# Patient Record
Sex: FEMALE | Race: WHITE | NOT HISPANIC OR LATINO | Employment: OTHER | URBAN - METROPOLITAN AREA
[De-identification: names, ages, dates, MRNs, and addresses within clinical notes are randomized per-mention and may not be internally consistent; named-entity substitution may affect disease eponyms.]

---

## 2017-01-30 ENCOUNTER — HISTORICAL (OUTPATIENT)
Dept: LAB | Facility: HOSPITAL | Age: 62
End: 2017-01-30

## 2017-04-04 ENCOUNTER — HISTORICAL (OUTPATIENT)
Dept: LAB | Facility: HOSPITAL | Age: 62
End: 2017-04-04

## 2017-05-02 ENCOUNTER — HISTORICAL (OUTPATIENT)
Dept: LAB | Facility: HOSPITAL | Age: 62
End: 2017-05-02

## 2017-05-02 LAB
BILIRUB SERPL-MCNC: NEGATIVE MG/DL
BLOOD URINE, POC: NORMAL
CLARITY, POC UA: CLEAR
COLOR, POC UA: YELLOW
GLUCOSE UR QL STRIP: NEGATIVE
KETONES UR QL STRIP: NEGATIVE
LEUKOCYTE EST, POC UA: NORMAL
NITRITE, POC UA: NEGATIVE
PH, POC UA: 6
PROTEIN, POC: NEGATIVE
SPECIFIC GRAVITY, POC UA: 1.01
UROBILINOGEN, POC UA: NORMAL

## 2017-07-19 ENCOUNTER — HISTORICAL (OUTPATIENT)
Dept: LAB | Facility: HOSPITAL | Age: 62
End: 2017-07-19

## 2017-07-19 LAB
BUN SERPL-MCNC: 16 MG/DL (ref 7–18)
CALCIUM SERPL-MCNC: 9.6 MG/DL (ref 8.5–10.1)
CHLORIDE SERPL-SCNC: 105 MMOL/L (ref 98–107)
CO2 SERPL-SCNC: 31.3 MMOL/L (ref 21–32)
CREAT SERPL-MCNC: 0.9 MG/DL (ref 0.55–1.02)
GLUCOSE SERPL-MCNC: 120 MG/DL (ref 74–106)
POTASSIUM SERPL-SCNC: 4.6 MMOL/L (ref 3.5–5.1)
SODIUM SERPL-SCNC: 144 MMOL/L (ref 136–145)

## 2017-07-28 ENCOUNTER — HISTORICAL (OUTPATIENT)
Dept: LAB | Facility: HOSPITAL | Age: 62
End: 2017-07-28

## 2017-07-28 LAB
BILIRUB SERPL-MCNC: NEGATIVE MG/DL
BLOOD URINE, POC: NORMAL
CLARITY, POC UA: NORMAL
COLOR, POC UA: NORMAL
GLUCOSE UR QL STRIP: NEGATIVE
KETONES UR QL STRIP: NEGATIVE
LEUKOCYTE EST, POC UA: NORMAL
NITRITE, POC UA: NEGATIVE
PH, POC UA: 5
PROTEIN, POC: NEGATIVE
SPECIFIC GRAVITY, POC UA: 1.02
UROBILINOGEN, POC UA: NORMAL

## 2017-08-14 ENCOUNTER — HISTORICAL (OUTPATIENT)
Dept: OCCUPATIONAL THERAPY | Facility: HOSPITAL | Age: 62
End: 2017-08-14

## 2017-08-15 ENCOUNTER — HOSPITAL ENCOUNTER (OUTPATIENT)
Dept: MEDSURG UNIT | Facility: HOSPITAL | Age: 62
End: 2017-08-17
Attending: FAMILY MEDICINE | Admitting: FAMILY MEDICINE

## 2017-08-15 LAB
APPEARANCE, UA: CLEAR
BACTERIA SPEC CULT: NORMAL
BILIRUB UR QL STRIP: NEGATIVE
COLOR UR: YELLOW
GLUCOSE (UA): NEGATIVE
HGB UR QL STRIP: NEGATIVE
KETONES UR QL STRIP: NEGATIVE
LEUKOCYTE ESTERASE UR QL STRIP: NEGATIVE
NITRITE UR QL STRIP: NEGATIVE
PH UR STRIP: 7 [PH] (ref 4.6–8)
PROT UR QL STRIP: NEGATIVE
RBC #/AREA URNS HPF: NORMAL /[HPF]
SP GR UR STRIP: 1.01 (ref 1–1.03)
SQUAMOUS EPITHELIAL, UA: NORMAL
UROBILINOGEN UR STRIP-ACNC: 0.2
WBC #/AREA URNS HPF: NORMAL /HPF

## 2017-08-16 LAB
INR PPP: 1.89
PROTHROMBIN TIME: 19.2 SECOND(S) (ref 9–11.5)
TSH SERPL-ACNC: 1.36 MIU/ML (ref 0.35–3.75)

## 2017-08-17 LAB
ERYTHROCYTE [SEDIMENTATION RATE] IN BLOOD: 33 MM/HR (ref 0–20)
INR PPP: 1.84
PROTHROMBIN TIME: 18.7 SECOND(S) (ref 9–11.5)
RHEUMATOID FACT SERPL-ACNC: 26.2 IU/ML (ref 0–15)
URATE SERPL-MCNC: 5.4 MG/DL (ref 2.6–7.2)

## 2017-09-08 ENCOUNTER — HISTORICAL (OUTPATIENT)
Dept: LAB | Facility: HOSPITAL | Age: 62
End: 2017-09-08

## 2017-09-08 LAB
ABS NEUT (OLG): 2.8
ALBUMIN SERPL-MCNC: 3.7 GM/DL (ref 3.4–5)
ALBUMIN/GLOB SERPL: 1 RATIO (ref 1.1–2)
ALP SERPL-CCNC: 88 UNIT/L (ref 50–136)
ALT SERPL-CCNC: 34 UNIT/L (ref 12–78)
AST SERPL-CCNC: 23 UNIT/L (ref 10–37)
BASOPHILS # BLD AUTO: 0.04 X10(3)/MCL
BASOPHILS NFR BLD AUTO: 0.6 %
BILIRUB SERPL-MCNC: 0.5 MG/DL (ref 0.2–1)
BILIRUBIN DIRECT+TOT PNL SERPL-MCNC: 0.1 MG/DL (ref 0.05–0.2)
BILIRUBIN DIRECT+TOT PNL SERPL-MCNC: 0.4 MG/DL
BUN SERPL-MCNC: 21 MG/DL (ref 7–18)
CALCIUM SERPL-MCNC: 9.5 MG/DL (ref 8.5–10.1)
CHLORIDE SERPL-SCNC: 105 MMOL/L (ref 98–107)
CHOLEST SERPL-MCNC: 210 MG/DL (ref 50–200)
CHOLEST/HDLC SERPL: 2 {RATIO} (ref 0–5)
CO2 SERPL-SCNC: 28.7 MMOL/L (ref 21–32)
CREAT SERPL-MCNC: 0.85 MG/DL (ref 0.55–1.02)
EOSINOPHIL # BLD AUTO: 0.25 X10(3)/MCL
EOSINOPHIL NFR BLD AUTO: 4 %
ERYTHROCYTE [DISTWIDTH] IN BLOOD BY AUTOMATED COUNT: 12 %
EST. AVERAGE GLUCOSE BLD GHB EST-MCNC: 157 MG/DL
FOLATE SERPL-MCNC: 41.9 NG/ML (ref 3.1–17.5)
GLOBULIN SER-MCNC: 3.8 GM/DL (ref 2.4–3.5)
GLUCOSE SERPL-MCNC: 177 MG/DL (ref 74–106)
HBA1C MFR BLD: 7.1 % (ref 4.5–6.2)
HCT VFR BLD AUTO: 40 % (ref 34–46)
HDLC SERPL-MCNC: 104 MG/DL (ref 35–60)
HGB BLD-MCNC: 12.6 GM/DL (ref 11.3–15.4)
IMM GRANULOCYTES # BLD AUTO: 0.01 10*3/UL (ref 0–0.1)
IMM GRANULOCYTES NFR BLD AUTO: 0.2 % (ref 0–1)
LDLC SERPL CALC-MCNC: 83 MG/DL (ref 50–140)
LYMPHOCYTES # BLD AUTO: 2.61 X10(3)/MCL
LYMPHOCYTES NFR BLD AUTO: 41.4 %
MCH RBC QN AUTO: 31.4 PG (ref 27–33)
MCHC RBC AUTO-ENTMCNC: 31.5 GM/DL (ref 32–35)
MCV RBC AUTO: 99.8 FL (ref 81–97)
MONOCYTES # BLD AUTO: 0.59 X10(3)/MCL
MONOCYTES NFR BLD AUTO: 9.4 %
NEUTROPHILS # BLD AUTO: 2.8 X10(3)/MCL
NEUTROPHILS NFR BLD AUTO: 44.4 %
PLATELET # BLD AUTO: 312 X10(3)/MCL (ref 151–368)
PMV BLD AUTO: 9 FL
POTASSIUM SERPL-SCNC: 4.2 MMOL/L (ref 3.5–5.1)
PROT SERPL-MCNC: 7.5 GM/DL (ref 6.4–8.2)
RBC # BLD AUTO: 4.01 X10(6)/MCL (ref 3.9–5)
SODIUM SERPL-SCNC: 143 MMOL/L (ref 136–145)
TRIGL SERPL-MCNC: 114 MG/DL (ref 30–150)
VIT B12 SERPL-MCNC: 1001 PG/ML (ref 193–986)
VLDLC SERPL CALC-MCNC: 23 MG/DL
WBC # SPEC AUTO: 6.3 X10(3)/MCL (ref 3.4–9.2)

## 2017-09-11 ENCOUNTER — HISTORICAL (OUTPATIENT)
Dept: LAB | Facility: HOSPITAL | Age: 62
End: 2017-09-11

## 2017-09-11 LAB
APPEARANCE, UA: CLEAR
BACTERIA SPEC CULT: ABNORMAL
BILIRUB UR QL STRIP: NEGATIVE
COLOR UR: ABNORMAL
GLUCOSE (UA): ABNORMAL
HGB UR QL STRIP: ABNORMAL
KETONES UR QL STRIP: NEGATIVE
LEUKOCYTE ESTERASE UR QL STRIP: NEGATIVE
NITRITE UR QL STRIP: POSITIVE
PH UR STRIP: 5 [PH] (ref 4.6–8)
PROT UR QL STRIP: ABNORMAL
RBC #/AREA URNS HPF: ABNORMAL /[HPF]
SP GR UR STRIP: 1.01 (ref 1–1.03)
SQUAMOUS EPITHELIAL, UA: ABNORMAL
UROBILINOGEN UR STRIP-ACNC: 2
WBC #/AREA URNS HPF: ABNORMAL /HPF

## 2017-09-14 ENCOUNTER — HISTORICAL (OUTPATIENT)
Dept: LAB | Facility: HOSPITAL | Age: 62
End: 2017-09-14

## 2017-10-02 ENCOUNTER — HISTORICAL (OUTPATIENT)
Dept: LAB | Facility: HOSPITAL | Age: 62
End: 2017-10-02

## 2017-10-02 LAB
APPEARANCE, UA: CLEAR
APTT PPP: 37.7 SECOND(S) (ref 23–32)
BACTERIA SPEC CULT: ABNORMAL
BILIRUB UR QL STRIP: NEGATIVE
COLOR UR: YELLOW
GLUCOSE (UA): ABNORMAL
HGB UR QL STRIP: ABNORMAL
INR PPP: 2.79
KETONES UR QL STRIP: NEGATIVE
LEUKOCYTE ESTERASE UR QL STRIP: NEGATIVE
NITRITE UR QL STRIP: NEGATIVE
PH UR STRIP: 6 [PH] (ref 4.6–8)
PROT SERPL-MCNC: 6.8 GM/DL
PROT UR QL STRIP: NEGATIVE
PROTHROMBIN TIME: 28.8 SECOND(S) (ref 9–11.5)
RBC #/AREA URNS HPF: ABNORMAL /HPF
SP GR UR STRIP: 1.01 (ref 1–1.03)
SQUAMOUS EPITHELIAL, UA: ABNORMAL
TSH SERPL-ACNC: 1.22 MIU/ML (ref 0.35–3.75)
UROBILINOGEN UR STRIP-ACNC: 0.2
WBC #/AREA URNS HPF: ABNORMAL /HPF

## 2017-10-04 LAB — FINAL CULTURE: NO GROWTH

## 2017-10-23 ENCOUNTER — HISTORICAL (OUTPATIENT)
Dept: RADIOLOGY | Facility: HOSPITAL | Age: 62
End: 2017-10-23

## 2018-03-21 ENCOUNTER — HISTORICAL (OUTPATIENT)
Dept: RADIOLOGY | Facility: HOSPITAL | Age: 63
End: 2018-03-21

## 2018-03-28 ENCOUNTER — HISTORICAL (OUTPATIENT)
Dept: LAB | Facility: HOSPITAL | Age: 63
End: 2018-03-28

## 2018-03-28 LAB
ALBUMIN SERPL-MCNC: 3.7 GM/DL (ref 3.4–5)
ALP SERPL-CCNC: 99 UNIT/L (ref 46–116)
ALT SERPL-CCNC: 37 UNIT/L (ref 12–78)
AST SERPL-CCNC: 28 UNIT/L (ref 10–37)
BILIRUB SERPL-MCNC: 0.4 MG/DL (ref 0.2–1)
BILIRUBIN DIRECT+TOT PNL SERPL-MCNC: 0.13 MG/DL (ref 0–0.2)
BILIRUBIN DIRECT+TOT PNL SERPL-MCNC: 0.27 MG/DL
CHOLEST SERPL-MCNC: 246 MG/DL (ref 50–200)
CHOLEST/HDLC SERPL: 3 {RATIO} (ref 0–5)
HDLC SERPL-MCNC: 81 MG/DL (ref 35–60)
LDLC SERPL CALC-MCNC: 144 MG/DL (ref 50–140)
PROT SERPL-MCNC: 7.7 GM/DL (ref 6.4–8.2)
TRIGL SERPL-MCNC: 105 MG/DL (ref 30–150)
VLDLC SERPL CALC-MCNC: 21 MG/DL

## 2018-08-20 ENCOUNTER — HISTORICAL (OUTPATIENT)
Dept: RADIOLOGY | Facility: HOSPITAL | Age: 63
End: 2018-08-20

## 2018-08-20 LAB
ABS NEUT (OLG): 4.1
ALBUMIN SERPL-MCNC: 3.4 GM/DL (ref 3.4–5)
ALBUMIN/GLOB SERPL: 0.9 RATIO (ref 1.1–2)
ALP SERPL-CCNC: 118 UNIT/L (ref 46–116)
ALT SERPL-CCNC: 55 UNIT/L (ref 12–78)
APPEARANCE, UA: CLEAR
APTT PPP: 29.8 SECOND(S) (ref 24.5–32.8)
AST SERPL-CCNC: 36 UNIT/L (ref 10–37)
BACTERIA SPEC CULT: ABNORMAL
BASOPHILS # BLD AUTO: 0.03 X10(3)/MCL
BASOPHILS NFR BLD AUTO: 0.4 %
BILIRUB SERPL-MCNC: 0.5 MG/DL (ref 0.2–1)
BILIRUB UR QL STRIP: NEGATIVE
BILIRUBIN DIRECT+TOT PNL SERPL-MCNC: 0.13 MG/DL (ref 0–0.2)
BILIRUBIN DIRECT+TOT PNL SERPL-MCNC: 0.37 MG/DL
BUN SERPL-MCNC: 18 MG/DL (ref 7–18)
CALCIUM SERPL-MCNC: 9.1 MG/DL (ref 8.5–10.1)
CHLORIDE SERPL-SCNC: 102 MMOL/L (ref 98–107)
CO2 SERPL-SCNC: 27.7 MMOL/L (ref 21–32)
COLOR UR: YELLOW
CREAT SERPL-MCNC: 0.78 MG/DL (ref 0.55–1.02)
EOSINOPHIL # BLD AUTO: 0.25 X10(3)/MCL
EOSINOPHIL NFR BLD AUTO: 3.5 %
ERYTHROCYTE [DISTWIDTH] IN BLOOD BY AUTOMATED COUNT: 13 %
GLOBULIN SER-MCNC: 3.8 GM/DL (ref 2.4–3.5)
GLUCOSE (UA): ABNORMAL
GLUCOSE SERPL-MCNC: 165 MG/DL (ref 74–106)
HCT VFR BLD AUTO: 38.8 % (ref 34–46)
HGB BLD-MCNC: 12.7 GM/DL (ref 11.3–15.4)
HGB UR QL STRIP: ABNORMAL
IMM GRANULOCYTES # BLD AUTO: 0.01 10*3/UL (ref 0–0.1)
IMM GRANULOCYTES NFR BLD AUTO: 0.1 % (ref 0–1)
INR PPP: 2.2
KETONES UR QL STRIP: ABNORMAL
LEUKOCYTE ESTERASE UR QL STRIP: NEGATIVE
LYMPHOCYTES # BLD AUTO: 2.11 X10(3)/MCL
LYMPHOCYTES NFR BLD AUTO: 29.9 %
MCH RBC QN AUTO: 32.7 PG (ref 27–33)
MCHC RBC AUTO-ENTMCNC: 32.7 GM/DL (ref 32–35)
MCV RBC AUTO: 100 FL (ref 81–97)
MONOCYTES # BLD AUTO: 0.56 X10(3)/MCL
MONOCYTES NFR BLD AUTO: 7.9 %
NEUTROPHILS # BLD AUTO: 4.1 X10(3)/MCL
NEUTROPHILS NFR BLD AUTO: 58.2 %
NITRITE UR QL STRIP: POSITIVE
PH UR STRIP: 5.5 [PH] (ref 4.6–8)
PLATELET # BLD AUTO: 281 X10(3)/MCL (ref 151–368)
PMV BLD AUTO: 9 FL
POTASSIUM SERPL-SCNC: 4.7 MMOL/L (ref 3.5–5.1)
PROT SERPL-MCNC: 7.2 GM/DL (ref 6.4–8.2)
PROT UR QL STRIP: NEGATIVE
PROTHROMBIN TIME: 22.2 SECOND(S) (ref 9.3–11.4)
RBC # BLD AUTO: 3.88 X10(6)/MCL (ref 3.9–5)
RBC #/AREA URNS HPF: ABNORMAL /HPF
SODIUM SERPL-SCNC: 138 MMOL/L (ref 136–145)
SP GR UR STRIP: 1.01 (ref 1–1.03)
SQUAMOUS EPITHELIAL, UA: ABNORMAL
UROBILINOGEN UR STRIP-ACNC: 0.2
WBC # SPEC AUTO: 7.06 X10(3)/MCL (ref 3.4–9.2)
WBC #/AREA URNS HPF: ABNORMAL /HPF

## 2018-08-28 ENCOUNTER — HISTORICAL (OUTPATIENT)
Dept: RADIOLOGY | Facility: HOSPITAL | Age: 63
End: 2018-08-28

## 2018-08-28 LAB
EST. AVERAGE GLUCOSE BLD GHB EST-MCNC: 171 MG/DL
HBA1C MFR BLD: 7.6 % (ref 4.5–6.2)

## 2018-09-26 ENCOUNTER — HISTORICAL (OUTPATIENT)
Dept: LAB | Facility: HOSPITAL | Age: 63
End: 2018-09-26

## 2018-09-26 LAB
APPEARANCE, UA: CLEAR
BACTERIA SPEC CULT: ABNORMAL
BILIRUB UR QL STRIP: NEGATIVE
COLOR UR: YELLOW
GLUCOSE (UA): ABNORMAL
HGB UR QL STRIP: ABNORMAL
KETONES UR QL STRIP: NEGATIVE
LEUKOCYTE ESTERASE UR QL STRIP: NEGATIVE
NITRITE UR QL STRIP: NEGATIVE
PH UR STRIP: 6.5 [PH] (ref 4.6–8)
PROT UR QL STRIP: NEGATIVE
RBC #/AREA URNS HPF: ABNORMAL /[HPF]
SP GR UR STRIP: 1.01 (ref 1–1.03)
SQUAMOUS EPITHELIAL, UA: ABNORMAL
UROBILINOGEN UR STRIP-ACNC: 0.2
WBC #/AREA URNS HPF: ABNORMAL /[HPF]

## 2018-10-22 ENCOUNTER — HISTORICAL (OUTPATIENT)
Dept: RADIOLOGY | Facility: HOSPITAL | Age: 63
End: 2018-10-22

## 2019-02-27 ENCOUNTER — HISTORICAL (OUTPATIENT)
Dept: LAB | Facility: HOSPITAL | Age: 64
End: 2019-02-27

## 2019-02-27 LAB
ALBUMIN SERPL-MCNC: 3.4 GM/DL (ref 3.4–5)
ALP SERPL-CCNC: 107 UNIT/L (ref 46–116)
ALT SERPL-CCNC: 32 UNIT/L (ref 12–78)
AST SERPL-CCNC: 26 UNIT/L (ref 10–37)
BILIRUB SERPL-MCNC: 0.4 MG/DL (ref 0.2–1)
BILIRUBIN DIRECT+TOT PNL SERPL-MCNC: 0.1 MG/DL (ref 0–0.2)
BILIRUBIN DIRECT+TOT PNL SERPL-MCNC: 0.3 MG/DL
CHOLEST SERPL-MCNC: 257 MG/DL (ref 50–200)
CHOLEST/HDLC SERPL: 3 {RATIO} (ref 0–5)
HDLC SERPL-MCNC: 75 MG/DL (ref 35–60)
LDLC SERPL CALC-MCNC: 159 MG/DL (ref 50–140)
PROT SERPL-MCNC: 7.7 GM/DL (ref 6.4–8.2)
TRIGL SERPL-MCNC: 113 MG/DL (ref 30–150)
VLDLC SERPL CALC-MCNC: 23 MG/DL

## 2019-09-06 ENCOUNTER — HISTORICAL (OUTPATIENT)
Dept: LAB | Facility: HOSPITAL | Age: 64
End: 2019-09-06

## 2019-09-06 LAB
ALBUMIN SERPL-MCNC: 3.4 GM/DL (ref 3.4–5)
ALP SERPL-CCNC: 92 UNIT/L (ref 46–116)
ALT SERPL-CCNC: 36 UNIT/L (ref 12–78)
AST SERPL-CCNC: 27 UNIT/L (ref 10–37)
BILIRUB SERPL-MCNC: 0.2 MG/DL (ref 0.2–1)
BILIRUBIN DIRECT+TOT PNL SERPL-MCNC: 0.1 MG/DL
BILIRUBIN DIRECT+TOT PNL SERPL-MCNC: 0.1 MG/DL (ref 0–0.2)
CHOLEST SERPL-MCNC: 272 MG/DL (ref 50–200)
CHOLEST/HDLC SERPL: 3 {RATIO} (ref 0–5)
HDLC SERPL-MCNC: 87 MG/DL (ref 35–60)
LDLC SERPL CALC-MCNC: 169 MG/DL (ref 50–140)
PROT SERPL-MCNC: 7.4 GM/DL (ref 6.4–8.2)
TRIGL SERPL-MCNC: 81 MG/DL (ref 30–150)
VLDLC SERPL CALC-MCNC: 16 MG/DL

## 2019-10-04 ENCOUNTER — HISTORICAL (OUTPATIENT)
Dept: LAB | Facility: HOSPITAL | Age: 64
End: 2019-10-04

## 2019-10-04 ENCOUNTER — HISTORICAL (OUTPATIENT)
Dept: WOUND CARE | Facility: HOSPITAL | Age: 64
End: 2019-10-04

## 2019-10-04 LAB
ABS NEUT (OLG): 3.69
BASOPHILS # BLD AUTO: 0.03 X10(3)/MCL
BASOPHILS NFR BLD AUTO: 0.5 %
EOSINOPHIL # BLD AUTO: 0.17 X10(3)/MCL
EOSINOPHIL NFR BLD AUTO: 2.7 %
ERYTHROCYTE [DISTWIDTH] IN BLOOD BY AUTOMATED COUNT: 13 %
EST. AVERAGE GLUCOSE BLD GHB EST-MCNC: 212 MG/DL
HBA1C MFR BLD: 9 % (ref 4.5–6.2)
HCT VFR BLD AUTO: 40.4 % (ref 34–46)
HGB BLD-MCNC: 13.1 GM/DL (ref 11.3–15.4)
IMM GRANULOCYTES # BLD AUTO: 0.01 10*3/UL (ref 0–0.1)
IMM GRANULOCYTES NFR BLD AUTO: 0.2 % (ref 0–1)
LYMPHOCYTES # BLD AUTO: 2.04 X10(3)/MCL
LYMPHOCYTES NFR BLD AUTO: 31.9 %
MCH RBC QN AUTO: 32.1 PG (ref 27–33)
MCHC RBC AUTO-ENTMCNC: 32.4 GM/DL (ref 32–35)
MCV RBC AUTO: 99 FL (ref 81–97)
MONOCYTES # BLD AUTO: 0.46 X10(3)/MCL
MONOCYTES NFR BLD AUTO: 7.2 %
NEUTROPHILS # BLD AUTO: 3.69 X10(3)/MCL
NEUTROPHILS NFR BLD AUTO: 57.5 %
PLATELET # BLD AUTO: 316 X10(3)/MCL (ref 140–450)
PMV BLD AUTO: 8 FL
PREALB SERPL-MCNC: 27.1 MG/DL (ref 18–38)
RBC # BLD AUTO: 4.08 X10(6)/MCL (ref 3.9–5)
WBC # SPEC AUTO: 6.4 X10(3)/MCL (ref 3.4–9.2)

## 2019-11-12 ENCOUNTER — HISTORICAL (OUTPATIENT)
Dept: RADIOLOGY | Facility: HOSPITAL | Age: 64
End: 2019-11-12

## 2019-11-12 LAB
ABS NEUT (OLG): 2.72
ALBUMIN SERPL-MCNC: 3.6 GM/DL (ref 3.4–5)
ALBUMIN/GLOB SERPL: 0.9 RATIO (ref 1.1–2)
ALP SERPL-CCNC: 126 UNIT/L (ref 46–116)
ALT SERPL-CCNC: 28 UNIT/L (ref 12–78)
APPEARANCE, UA: ABNORMAL
APTT PPP: 44.6 SECOND(S) (ref 24.5–32.8)
AST SERPL-CCNC: 18 UNIT/L (ref 10–37)
BACTERIA SPEC CULT: ABNORMAL
BASOPHILS # BLD AUTO: 0.03 X10(3)/MCL
BASOPHILS NFR BLD AUTO: 0.5 %
BILIRUB SERPL-MCNC: 0.3 MG/DL (ref 0.2–1)
BILIRUB UR QL STRIP: NEGATIVE
BILIRUBIN DIRECT+TOT PNL SERPL-MCNC: 0.09 MG/DL (ref 0–0.2)
BILIRUBIN DIRECT+TOT PNL SERPL-MCNC: 0.21 MG/DL
BUN SERPL-MCNC: 18 MG/DL (ref 7–18)
CALCIUM SERPL-MCNC: 9.7 MG/DL (ref 8.5–10.1)
CHLORIDE SERPL-SCNC: 105 MMOL/L (ref 98–107)
CO2 SERPL-SCNC: 25.7 MMOL/L (ref 21–32)
COLOR UR: YELLOW
CREAT SERPL-MCNC: 0.91 MG/DL (ref 0.55–1.02)
EOSINOPHIL # BLD AUTO: 0.29 X10(3)/MCL
EOSINOPHIL NFR BLD AUTO: 4.8 %
ERYTHROCYTE [DISTWIDTH] IN BLOOD BY AUTOMATED COUNT: 15 %
GLOBULIN SER-MCNC: 4.2 GM/DL (ref 2.4–3.5)
GLUCOSE (UA): ABNORMAL
GLUCOSE SERPL-MCNC: 205 MG/DL (ref 74–106)
HCT VFR BLD AUTO: 36.1 % (ref 34–46)
HGB BLD-MCNC: 11.4 GM/DL (ref 11.3–15.4)
HGB UR QL STRIP: ABNORMAL
IMM GRANULOCYTES # BLD AUTO: 0.01 10*3/UL (ref 0–0.1)
IMM GRANULOCYTES NFR BLD AUTO: 0.2 % (ref 0–1)
INR PPP: 4.8
KETONES UR QL STRIP: NEGATIVE
LEUKOCYTE ESTERASE UR QL STRIP: NEGATIVE
LYMPHOCYTES # BLD AUTO: 2.49 X10(3)/MCL
LYMPHOCYTES NFR BLD AUTO: 40.8 %
MCH RBC QN AUTO: 31.1 PG (ref 27–33)
MCHC RBC AUTO-ENTMCNC: 31.6 GM/DL (ref 32–35)
MCV RBC AUTO: 98.4 FL (ref 81–97)
MONOCYTES # BLD AUTO: 0.56 X10(3)/MCL
MONOCYTES NFR BLD AUTO: 9.2 %
NEUTROPHILS # BLD AUTO: 2.72 X10(3)/MCL
NEUTROPHILS NFR BLD AUTO: 44.5 %
NITRITE UR QL STRIP: POSITIVE
PH UR STRIP: 5.5 [PH] (ref 4.6–8)
PLATELET # BLD AUTO: 385 X10(3)/MCL (ref 140–450)
PMV BLD AUTO: 9 FL
POTASSIUM SERPL-SCNC: 4.5 MMOL/L (ref 3.5–5.1)
PROT SERPL-MCNC: 7.8 GM/DL (ref 6.4–8.2)
PROT UR QL STRIP: NEGATIVE
PROTHROMBIN TIME: 48.6 SECOND(S) (ref 8.6–10.1)
RBC # BLD AUTO: 3.67 X10(6)/MCL (ref 3.9–5)
RBC #/AREA URNS HPF: ABNORMAL /[HPF]
SODIUM SERPL-SCNC: 144 MMOL/L (ref 136–145)
SP GR UR STRIP: 1.02 (ref 1–1.03)
SQUAMOUS EPITHELIAL, UA: ABNORMAL
UROBILINOGEN UR STRIP-ACNC: 0.2
WBC # SPEC AUTO: 6.1 X10(3)/MCL (ref 3.4–9.2)
WBC #/AREA URNS HPF: ABNORMAL /HPF

## 2019-11-15 ENCOUNTER — HISTORICAL (OUTPATIENT)
Dept: LAB | Facility: HOSPITAL | Age: 64
End: 2019-11-15

## 2019-11-15 LAB
FINAL CULTURE: NORMAL
INR PPP: 4.6
PROTHROMBIN TIME: 46.6 SECOND(S) (ref 8.6–10.1)

## 2019-11-18 ENCOUNTER — HISTORICAL (OUTPATIENT)
Dept: LAB | Facility: HOSPITAL | Age: 64
End: 2019-11-18

## 2019-11-18 ENCOUNTER — NURSE TRIAGE (OUTPATIENT)
Dept: ADMINISTRATIVE | Facility: CLINIC | Age: 64
End: 2019-11-18

## 2019-11-18 LAB
INR PPP: 2.2
PROTHROMBIN TIME: 22 SECOND(S) (ref 8.6–10.1)

## 2019-11-18 NOTE — TELEPHONE ENCOUNTER
Reason for Disposition   Caller has urgent medication question about med that PCP prescribed and triager unable to answer question    Protocols used: MEDICATION QUESTION CALL-A-    Patient called and states her inr is 3.4. She has not been on her warfarin since 11/15. She wanted to know today's lab value to know how much warfarin to take. Explained to her that she would have to contact her local cardiology group at [facility] to see who is on call. If no one on call, she would have to go to the ED. Patient refused the ED disposition. Unsure what she will do.

## 2019-11-19 ENCOUNTER — HISTORICAL (OUTPATIENT)
Dept: LAB | Facility: HOSPITAL | Age: 64
End: 2019-11-19

## 2019-11-19 LAB
APPEARANCE, UA: CLEAR
BACTERIA SPEC CULT: ABNORMAL
BILIRUB UR QL STRIP: NEGATIVE
COLOR UR: YELLOW
GLUCOSE (UA): ABNORMAL
HGB UR QL STRIP: ABNORMAL
KETONES UR QL STRIP: NEGATIVE
LEUKOCYTE ESTERASE UR QL STRIP: NEGATIVE
NITRITE UR QL STRIP: POSITIVE
PH UR STRIP: 6 [PH] (ref 4.6–8)
PROT UR QL STRIP: NEGATIVE
RBC #/AREA URNS HPF: ABNORMAL /[HPF]
SP GR UR STRIP: 1.01 (ref 1–1.03)
SQUAMOUS EPITHELIAL, UA: ABNORMAL
UROBILINOGEN UR STRIP-ACNC: 0.2
WBC #/AREA URNS HPF: ABNORMAL /HPF

## 2019-11-25 ENCOUNTER — HISTORICAL (OUTPATIENT)
Dept: LAB | Facility: HOSPITAL | Age: 64
End: 2019-11-25

## 2019-11-25 LAB
INR PPP: 1.6
PROTHROMBIN TIME: 16.7 SECOND(S) (ref 8.6–10.1)

## 2019-11-26 ENCOUNTER — HISTORICAL (OUTPATIENT)
Dept: LAB | Facility: HOSPITAL | Age: 64
End: 2019-11-26

## 2019-11-26 LAB
APPEARANCE, UA: CLEAR
BACTERIA SPEC CULT: ABNORMAL
BILIRUB UR QL STRIP: NEGATIVE
COLOR UR: YELLOW
GLUCOSE (UA): ABNORMAL
HGB UR QL STRIP: ABNORMAL
KETONES UR QL STRIP: NEGATIVE
LEUKOCYTE ESTERASE UR QL STRIP: NEGATIVE
NITRITE UR QL STRIP: NEGATIVE
PH UR STRIP: 5.5 [PH] (ref 4.6–8)
PROT UR QL STRIP: NEGATIVE
RBC #/AREA URNS HPF: ABNORMAL /[HPF]
SP GR UR STRIP: 1.01 (ref 1–1.03)
SQUAMOUS EPITHELIAL, UA: ABNORMAL
UROBILINOGEN UR STRIP-ACNC: 0.2
WBC #/AREA URNS HPF: ABNORMAL /HPF

## 2020-01-20 ENCOUNTER — HISTORICAL (OUTPATIENT)
Dept: RADIOLOGY | Facility: HOSPITAL | Age: 65
End: 2020-01-20

## 2020-04-13 ENCOUNTER — HISTORICAL (OUTPATIENT)
Dept: RADIOLOGY | Facility: HOSPITAL | Age: 65
End: 2020-04-13

## 2020-09-29 ENCOUNTER — HISTORICAL (OUTPATIENT)
Dept: LAB | Facility: HOSPITAL | Age: 65
End: 2020-09-29

## 2020-09-29 LAB
ABS NEUT (OLG): 4.38
BASOPHILS # BLD AUTO: 0.04 X10(3)/MCL
BASOPHILS NFR BLD AUTO: 0.6 %
BUN SERPL-MCNC: 18 MG/DL (ref 9.8–20.1)
CALCIUM SERPL-MCNC: 9.6 MG/DL (ref 8.4–10.2)
CHLORIDE SERPL-SCNC: 104 MMOL/L (ref 98–107)
CO2 SERPL-SCNC: 27 MEQ/L (ref 23–31)
CREAT SERPL-MCNC: 0.97 MG/DL (ref 0.55–1.02)
CREAT/UREA NIT SERPL: 19
EOSINOPHIL # BLD AUTO: 0.11 X10(3)/MCL
EOSINOPHIL NFR BLD AUTO: 1.6 %
ERYTHROCYTE [DISTWIDTH] IN BLOOD BY AUTOMATED COUNT: 13 %
GLUCOSE SERPL-MCNC: 257 MG/DL (ref 82–115)
HCT VFR BLD AUTO: 42.2 % (ref 34–46)
HGB BLD-MCNC: 13.5 GM/DL (ref 11.3–15.4)
IMM GRANULOCYTES # BLD AUTO: 0 10*3/UL (ref 0–0.1)
IMM GRANULOCYTES NFR BLD AUTO: 0 % (ref 0–1)
LYMPHOCYTES # BLD AUTO: 1.75 X10(3)/MCL
LYMPHOCYTES NFR BLD AUTO: 25.4 %
MCH RBC QN AUTO: 31.8 PG (ref 27–33)
MCHC RBC AUTO-ENTMCNC: 32 GM/DL (ref 32–35)
MCV RBC AUTO: 99.3 FL (ref 81–97)
MONOCYTES # BLD AUTO: 0.6 X10(3)/MCL
MONOCYTES NFR BLD AUTO: 8.7 %
NEUTROPHILS # BLD AUTO: 4.38 X10(3)/MCL
NEUTROPHILS NFR BLD AUTO: 63.7 %
PLATELET # BLD AUTO: 268 X10(3)/MCL (ref 140–450)
PMV BLD AUTO: 9 FL
POTASSIUM SERPL-SCNC: 4.9 MMOL/L (ref 3.5–5.1)
RBC # BLD AUTO: 4.25 X10(6)/MCL (ref 3.9–5)
SODIUM SERPL-SCNC: 144 MMOL/L (ref 136–145)
WBC # SPEC AUTO: 6.88 X10(3)/MCL (ref 3.4–9.2)

## 2020-10-20 ENCOUNTER — HISTORICAL (OUTPATIENT)
Dept: RADIOLOGY | Facility: HOSPITAL | Age: 65
End: 2020-10-20

## 2020-11-02 ENCOUNTER — HISTORICAL (OUTPATIENT)
Dept: LAB | Facility: HOSPITAL | Age: 65
End: 2020-11-02

## 2020-11-02 LAB
EST. AVERAGE GLUCOSE BLD GHB EST-MCNC: 203 MG/DL
HBA1C MFR BLD: 8.7 % (ref 4–6)

## 2020-11-05 ENCOUNTER — HISTORICAL (OUTPATIENT)
Dept: RADIOLOGY | Facility: HOSPITAL | Age: 65
End: 2020-11-05

## 2020-11-12 ENCOUNTER — HISTORICAL (OUTPATIENT)
Dept: CARDIOLOGY | Facility: HOSPITAL | Age: 65
End: 2020-11-12

## 2020-11-12 LAB
ABS NEUT (OLG): 9.15 X10(3)/MCL (ref 2.1–9.2)
BASOPHILS # BLD AUTO: 0 X10(3)/MCL (ref 0–0.2)
BASOPHILS NFR BLD AUTO: 0 %
BUN SERPL-MCNC: 18.5 MG/DL (ref 9.8–20.1)
CALCIUM SERPL-MCNC: 9.6 MG/DL (ref 8.4–10.2)
CHLORIDE SERPL-SCNC: 101 MMOL/L (ref 98–107)
CO2 SERPL-SCNC: 25 MMOL/L (ref 23–31)
CREAT SERPL-MCNC: 0.89 MG/DL (ref 0.55–1.02)
CREAT/UREA NIT SERPL: 21
ERYTHROCYTE [DISTWIDTH] IN BLOOD BY AUTOMATED COUNT: 12.5 % (ref 11.5–17)
GLUCOSE SERPL-MCNC: 327 MG/DL (ref 82–115)
HCT VFR BLD AUTO: 41.7 % (ref 37–47)
HGB BLD-MCNC: 13.6 GM/DL (ref 12–16)
INR PPP: 1.1 (ref 0–1.3)
LYMPHOCYTES # BLD AUTO: 1.4 X10(3)/MCL (ref 0.6–4.6)
LYMPHOCYTES NFR BLD AUTO: 13 %
MCH RBC QN AUTO: 32.1 PG (ref 27–31)
MCHC RBC AUTO-ENTMCNC: 32.6 GM/DL (ref 33–36)
MCV RBC AUTO: 98.3 FL (ref 80–94)
MONOCYTES # BLD AUTO: 0.3 X10(3)/MCL (ref 0.1–1.3)
MONOCYTES NFR BLD AUTO: 3 %
NEUTROPHILS # BLD AUTO: 9.15 X10(3)/MCL (ref 2.1–9.2)
NEUTROPHILS NFR BLD AUTO: 83 %
PLATELET # BLD AUTO: 326 X10(3)/MCL (ref 130–400)
PMV BLD AUTO: 9.4 FL (ref 9.4–12.4)
POTASSIUM SERPL-SCNC: 4.3 MMOL/L (ref 3.5–5.1)
PROTHROMBIN TIME: 13.9 SECOND(S) (ref 11.1–13.7)
RBC # BLD AUTO: 4.24 X10(6)/MCL (ref 4.2–5.4)
SODIUM SERPL-SCNC: 141 MMOL/L (ref 136–145)
WBC # SPEC AUTO: 11 X10(3)/MCL (ref 4.5–11.5)

## 2021-03-02 ENCOUNTER — HISTORICAL (OUTPATIENT)
Dept: LAB | Facility: HOSPITAL | Age: 66
End: 2021-03-02

## 2021-03-02 LAB
EST. AVERAGE GLUCOSE BLD GHB EST-MCNC: 212 MG/DL
HBA1C MFR BLD: 9 % (ref 4–6)

## 2021-10-05 ENCOUNTER — HISTORICAL (OUTPATIENT)
Dept: ANESTHESIOLOGY | Facility: HOSPITAL | Age: 66
End: 2021-10-05

## 2021-10-08 ENCOUNTER — HISTORICAL (OUTPATIENT)
Dept: RADIOLOGY | Facility: HOSPITAL | Age: 66
End: 2021-10-08

## 2021-10-12 LAB
LEFT EYE DM RETINOPATHY: NEGATIVE
RIGHT EYE DM RETINOPATHY: NEGATIVE

## 2021-11-04 ENCOUNTER — HISTORICAL (OUTPATIENT)
Dept: LAB | Facility: HOSPITAL | Age: 66
End: 2021-11-04

## 2021-11-04 LAB
ABS NEUT (OLG): 3.3
ALBUMIN SERPL-MCNC: 3.5 GM/DL (ref 3.4–4.8)
ALBUMIN/GLOB SERPL: 0.9 RATIO (ref 1.1–2)
ALP SERPL-CCNC: 76 UNIT/L (ref 40–150)
ALT SERPL-CCNC: 38 UNIT/L (ref 0–55)
AST SERPL-CCNC: 27 UNIT/L (ref 5–34)
BASOPHILS # BLD AUTO: 0.04 X10(3)/MCL
BASOPHILS NFR BLD AUTO: 0.6 %
BILIRUB SERPL-MCNC: 0.4 MG/DL
BILIRUBIN DIRECT+TOT PNL SERPL-MCNC: 0.2 MG/DL
BILIRUBIN DIRECT+TOT PNL SERPL-MCNC: 0.2 MG/DL (ref 0–0.5)
BUN SERPL-MCNC: 23 MG/DL (ref 9.8–20.1)
CALCIUM SERPL-MCNC: 9.8 MG/DL (ref 8.7–10.5)
CHLORIDE SERPL-SCNC: 105 MMOL/L (ref 98–107)
CHOLEST SERPL-MCNC: 187 MG/DL
CHOLEST/HDLC SERPL: 3 {RATIO} (ref 0–5)
CO2 SERPL-SCNC: 29 MEQ/L (ref 23–31)
CREAT SERPL-MCNC: 0.8 MG/DL (ref 0.55–1.02)
EOSINOPHIL # BLD AUTO: 0.16 X10(3)/MCL
EOSINOPHIL NFR BLD AUTO: 2.6 %
ERYTHROCYTE [DISTWIDTH] IN BLOOD BY AUTOMATED COUNT: 13 %
EST. AVERAGE GLUCOSE BLD GHB EST-MCNC: 217 MG/DL
GLOBULIN SER-MCNC: 3.8 GM/DL (ref 2.4–3.5)
GLUCOSE SERPL-MCNC: 80 MG/DL (ref 82–115)
HBA1C MFR BLD: 9.2 % (ref 4–6)
HCT VFR BLD AUTO: 41.6 % (ref 34–46)
HDLC SERPL-MCNC: 73 MG/DL (ref 35–60)
HGB BLD-MCNC: 12.7 GM/DL (ref 11.3–15.4)
IMM GRANULOCYTES # BLD AUTO: 0 10*3/UL (ref 0–0.1)
IMM GRANULOCYTES NFR BLD AUTO: 0 % (ref 0–1)
LDLC SERPL CALC-MCNC: 92 MG/DL (ref 50–140)
LYMPHOCYTES # BLD AUTO: 2.18 X10(3)/MCL
LYMPHOCYTES NFR BLD AUTO: 35.2 %
MCH RBC QN AUTO: 30.4 PG (ref 27–33)
MCHC RBC AUTO-ENTMCNC: 30.5 GM/DL (ref 32–35)
MCV RBC AUTO: 99.5 FL (ref 81–97)
MONOCYTES # BLD AUTO: 0.52 X10(3)/MCL
MONOCYTES NFR BLD AUTO: 8.4 %
NEUTROPHILS # BLD AUTO: 3.3 X10(3)/MCL
NEUTROPHILS NFR BLD AUTO: 53.2 %
PLATELET # BLD AUTO: 308 X10(3)/MCL (ref 140–450)
PMV BLD AUTO: 8 FL
POTASSIUM SERPL-SCNC: 4.4 MMOL/L (ref 3.5–5.1)
PROT SERPL-MCNC: 7.3 GM/DL (ref 5.8–7.6)
RBC # BLD AUTO: 4.18 X10(6)/MCL (ref 3.9–5)
SODIUM SERPL-SCNC: 144 MMOL/L (ref 136–145)
TRIGL SERPL-MCNC: 109 MG/DL (ref 37–140)
VLDLC SERPL CALC-MCNC: 22 MG/DL
WBC # SPEC AUTO: 6.2 X10(3)/MCL (ref 3.4–9.2)

## 2021-11-10 ENCOUNTER — HISTORICAL (OUTPATIENT)
Dept: RADIOLOGY | Facility: HOSPITAL | Age: 66
End: 2021-11-10

## 2021-11-16 ENCOUNTER — HISTORICAL (OUTPATIENT)
Dept: RADIOLOGY | Facility: HOSPITAL | Age: 66
End: 2021-11-16

## 2021-11-16 LAB — BCS RECOMMENDATION EXT: NORMAL

## 2021-12-03 ENCOUNTER — HISTORICAL (OUTPATIENT)
Dept: ANESTHESIOLOGY | Facility: HOSPITAL | Age: 66
End: 2021-12-03

## 2021-12-03 LAB
INR PPP: 0.9 (ref 2–3)
PROTHROMBIN TIME: 12.4 SEC (ref 11.7–14.5)

## 2021-12-14 ENCOUNTER — HISTORICAL (OUTPATIENT)
Dept: LAB | Facility: HOSPITAL | Age: 66
End: 2021-12-14

## 2021-12-14 LAB
ABS NEUT (OLG): 4.94
ALBUMIN SERPL-MCNC: 3.6 GM/DL (ref 3.4–4.8)
ALBUMIN/GLOB SERPL: 0.9 RATIO (ref 1.1–2)
ALP SERPL-CCNC: 96 UNIT/L (ref 40–150)
ALT SERPL-CCNC: 36 UNIT/L (ref 0–55)
APTT PPP: 23.5 SECOND(S) (ref 21.4–28.3)
AST SERPL-CCNC: 27 UNIT/L (ref 5–34)
BASOPHILS # BLD AUTO: 0.03 X10(3)/MCL
BASOPHILS NFR BLD AUTO: 0.4 %
BILIRUB SERPL-MCNC: 0.6 MG/DL
BILIRUBIN DIRECT+TOT PNL SERPL-MCNC: 0.2 MG/DL (ref 0–0.5)
BILIRUBIN DIRECT+TOT PNL SERPL-MCNC: 0.4 MG/DL
BUN SERPL-MCNC: 24 MG/DL (ref 9.8–20.1)
CALCIUM SERPL-MCNC: 9.9 MG/DL (ref 8.7–10.5)
CHLORIDE SERPL-SCNC: 103 MMOL/L (ref 98–107)
CO2 SERPL-SCNC: 25 MEQ/L (ref 23–31)
CREAT SERPL-MCNC: 0.94 MG/DL (ref 0.55–1.02)
EOSINOPHIL # BLD AUTO: 0.15 X10(3)/MCL
EOSINOPHIL NFR BLD AUTO: 1.9 %
ERYTHROCYTE [DISTWIDTH] IN BLOOD BY AUTOMATED COUNT: 13 %
GLOBULIN SER-MCNC: 4 GM/DL (ref 2.4–3.5)
GLUCOSE SERPL-MCNC: 282 MG/DL (ref 82–115)
HCT VFR BLD AUTO: 42.5 % (ref 34–46)
HGB BLD-MCNC: 13.4 GM/DL (ref 11.3–15.4)
IMM GRANULOCYTES # BLD AUTO: 0.02 10*3/UL (ref 0–0.1)
IMM GRANULOCYTES NFR BLD AUTO: 0.2 % (ref 0–1)
INR PPP: 1.1
LYMPHOCYTES # BLD AUTO: 2.34 X10(3)/MCL
LYMPHOCYTES NFR BLD AUTO: 29.1 %
MCH RBC QN AUTO: 31.2 PG (ref 27–33)
MCHC RBC AUTO-ENTMCNC: 31.5 GM/DL (ref 32–35)
MCV RBC AUTO: 98.8 FL (ref 81–97)
MONOCYTES # BLD AUTO: 0.57 X10(3)/MCL
MONOCYTES NFR BLD AUTO: 7.1 %
NEUTROPHILS # BLD AUTO: 4.94 X10(3)/MCL
NEUTROPHILS NFR BLD AUTO: 61.3 %
PLATELET # BLD AUTO: 311 X10(3)/MCL (ref 140–450)
PMV BLD AUTO: 9 FL
POTASSIUM SERPL-SCNC: 4.5 MMOL/L (ref 3.5–5.1)
PROT SERPL-MCNC: 7.6 GM/DL (ref 5.8–7.6)
PROTHROMBIN TIME: 10.7 SECOND(S) (ref 9.1–10.9)
RBC # BLD AUTO: 4.3 X10(6)/MCL (ref 3.9–5)
SARS-COV-2 AG RESP QL IA.RAPID: NEGATIVE
SODIUM SERPL-SCNC: 141 MMOL/L (ref 136–145)
WBC # SPEC AUTO: 8.05 X10(3)/MCL (ref 3.4–9.2)

## 2021-12-15 ENCOUNTER — HISTORICAL (OUTPATIENT)
Dept: MEDSURG UNIT | Facility: HOSPITAL | Age: 66
End: 2021-12-15

## 2021-12-23 ENCOUNTER — HISTORICAL (OUTPATIENT)
Dept: INFUSION THERAPY | Facility: HOSPITAL | Age: 66
End: 2021-12-23

## 2022-04-11 ENCOUNTER — HISTORICAL (OUTPATIENT)
Dept: ADMINISTRATIVE | Facility: HOSPITAL | Age: 67
End: 2022-04-11
Payer: MEDICARE

## 2022-04-28 VITALS
BODY MASS INDEX: 29.74 KG/M2 | SYSTOLIC BLOOD PRESSURE: 146 MMHG | DIASTOLIC BLOOD PRESSURE: 77 MMHG | HEIGHT: 64 IN | OXYGEN SATURATION: 96 % | WEIGHT: 174.19 LBS

## 2022-04-30 NOTE — DISCHARGE SUMMARY
Patient:   Starr Kidd            MRN: [id number]            FIN: [id number]               Age:   61 years     Sex:  Female     :  1955   Associated Diagnoses:   Intractable back pain; DM (diabetes mellitus)   Author:   Samuel JAY, Obey Yip      Discharge Information      Discharge Summary Information   ADMIT/DISCHARGE DATE   Admit Date: 08/15/2017  Discharge Date: 2017     Physicians   Attending Physician - Samuel JAY, Obey Yip  Admitting Physician - Samuel JAY, Obey Yip  Consulting Physician - Samuel JAY, Obey Yip  Primary Care Physician - Samuel JAY, Obey Yip     Discharge Diagnosis   Dorsalgia, unspecified (M54.9)   Type 2 diabetes mellitus without complications (E11.9)      Procedures   No procedures recorded for this visit.   Immunizations   No immunizations recorded for this visit.     Discharge Medications   Prescribed  acetaminophen-HYDROcodone (acetaminophen-hydrocodone 325 mg-10 mg oral tablet) 1 tab(s), Oral, BID, PRN pain  pregabalin (Lyrica 50 mg oral capsule) 50 mg, Oral, TID  Continue  alginic acid-magnesium carbonate (Gaviscon Heartburn Relief Extra Strength)  cetirizine (ZyrTEC Liquid Gels) 1 tab, Oral, Daily  cholestyramine (cholestyramine 4 g/5 g oral powder) 4 gm, Oral, BID  evolocumab (Repatha 140 Mg/ml Sureclick) 140 mg, Subcutaneous, q2wk  fluticasone nasal (Flonase 50 mcg/inh nasal spray) 2 spray(s), Nasal, BID  insulin lispro-insulin lispro protamine (HumaLOG Mix 75/25) 15 units, Subcutaneous, qPM  pantoprazole (Protonix 40 mg ORAL enteric coated tablet) 40 mg, Oral, Daily  sitaGLIPtin (Januvia) 100 mg, Oral, Daily  warfarin (WARFARIN SODIUM 5 MG TABS) 5 mg, Oral, Daily  warfarin (warfarin 2.5 mg oral tablet) 2.5 mg, Oral, M,F  Discontinue  acetaminophen-oxyCODONE (acetaminophen-oxycodone 325 mg-7.5 mg oral tablet) 1 tab(s), Oral, q4hr, PRN as needed for pain  levocetirizine (Xyzal 5 mg oral tablet) 5 mg, Oral, qPM        Education   Back  Exercises  Discharge - Home         Followup   Obey Baker, within 1 month        Hospital Course   Hospital Course   This is a 61-year-old white female with a history of right AKA, multiple surgeries, and chronic back pain who presented to our [facility] complaining of back pain, neck pain, arm pain, leg pain, phantom pain.  Robbie showed several bulging disc and pinched nerves.  MRI performed during that admit showed multilevel spondylosis more pronounced at C5, C6, and C7.  Patient seen by neurosurgery during that admit.  Recommendation for conservative management with pain management and physical therapy was made at that time.  Patient's pain improved during admit after starting Lyrica.  She was discharged home.  Patient represented to the ER due to uncontrolled back pain at home.  Patient was admitted due to intractable back pain.  Patient started on IV pain medication and transition to p.o. pain medication.  Follow-up appointment with neurology scheduled.  We will increase Lyrica to 50 mg 3 times daily.  Patient will be given Rx for Norco 10/325 as needed pain.  Patient will be discharged today 8/17/17.   .        Physical Examination   Vital Signs   8/17/2017 12:00 CDT      Temperature Oral          36.8 DegC                             Peripheral Pulse Rate     71 bpm                             Respiratory Rate          16 br/min                             SpO2                      97 %                             Oxygen Therapy            Room air                             Systolic Blood Pressure   126 mmHg                             Diastolic Blood Pressure  56 mmHg  LOW                             Mean Arterial Pressure, Cuff              79 mmHg        No qualifying data available   Intake and Output   Fluid Balance Primitives   8/17/2017 11:24 CDT      Oral Intake               120 mL    8/17/2017 8:00 CDT       Oral Intake               120 mL    8/17/2017 7:00 CDT        Urine Count               2                             Stool Count               0        General:  Alert and oriented.         Skin: No cyanosis, No clubbing, No edema.    Respiratory:  Lungs are clear to auscultation, Respirations are non-labored, Breath sounds are equal.    Cardiovascular:  Normal rate, Regular rhythm, No murmur.    Gastrointestinal:  Soft, Non-tender, Non-distended, Normal bowel sounds.    Integumentary:  Warm.    Neurologic:  Alert, Oriented.    Psychiatric:  Cooperative, Appropriate mood & affect, Normal judgment.       Discharge Plan   Discharge Summary Plan   Discharge disposition: discharge to home into the care of family member.     Diagnosis     Intractable back pain (VKI67-VB M54.9).     DM (diabetes mellitus) (OFR16-FS E11.9).

## 2022-04-30 NOTE — H&P
Patient:   Starr Kidd            MRN: [id number]            FIN: [id number]               Age:   61 years     Sex:  Female     :  1955   Associated Diagnoses:   Intractable back pain; DM (diabetes mellitus)   Author:   Samuel JAY, Obey Yip      Basic Information   Source of history:  Self.    Referral source:  Emergency department.    History limitation:  None.       Chief Complaint   8/15/2017 10:31 CDT      pt to er c/o backpain. states was d/c from [facility] two days ago for the same. states has been having pain for months.        History of Present Illness             The patient presents with Back pain.  This is a 61-year-old white female with a history of right AKA, multiple surgeries, and chronic back pain who presented to our [facility] complaining of back pain, neck pain, arm pain, leg pain, phantom pain.  Robbie showed several bulging disc and pinched nerves.  MRI performed during that admit showed multilevel spondylosis more pronounced at C5, C6, and C7.  Patient seen by neurosurgery during that admit.  Recommendation for conservative management with pain management and physical he was made at that time.  Patient's pain improved during admit Lyrica.  She was discharged home.  Patient represented to the ER due to uncontrolled back pain at home.  Patient will be admitted due to intractable back pain..        Review of Systems   Constitutional:  Negative.    Respiratory:  Negative.    Cardiovascular:  Negative.    Gastrointestinal:  Negative.    Musculoskeletal:  Neck pain, No trauma.         Back pain: In the lower region.       Health Status   Allergies:    Allergic Reactions (Selected)  Severity Not Documented  Saldivar's yeast- Rash.  Codeine- Anaphylaxis.  Contrast Dye- Anaphylaxis.  Darvocet-N 50- Sob.  Demerol HCl- Sob.  Egg-containing compound- Rash.  Iodine- Anaphylaxis.  Penicillins- Anaphylaxis.  Pork- Nausea.  Sulfa drugs- Anaphylactic.   Problem list:    All  Problems (Selected)  ASHD (arteriosclerotic heart disease) / SNOMED CT 49441S8E-[id number] / Confirmed  CAD (coronary artery disease) / SNOMED CT [id number] / Confirmed  Diabetes / SNOMED CT 7N3450WG-[id number] / Confirmed  DM (diabetes mellitus) / ICD-9-.00 / Confirmed  Dyslipidemia / SNOMED CT [id number] / Confirmed  HTN (hypertension) / SNOMED CT [id number]-[id number] / Confirmed  Neuropathy / SNOMED CT 3128603260 / Confirmed  Osteoarthritis / SNOMED CT [id number] / Confirmed  PAD (peripheral artery disease) / SNOMED CT [id number] / Confirmed  PVD (peripheral vascular disease) / SNOMED CT [id number]-W00VOD4500L1 / Confirmed  Renal insufficiency / SNOMED CT 3033155423 / Confirmed      Histories   Procedure history:    Bypass Femoral Tibial performed by Aurea JAY, Serg BUSTAMANTE on 11/9/2015 at 60 Years.  Comments:  11/9/2015 10:24 - Aury Suarez RN  auto-populated from documented surgical case  angioplasty with stent on 2/1/2013 at 57 Years.  Colonoscopy (SNOMED CT 895721571) performed by Obey Baker MD in 2011 at 56 Years.  Total abdominal hysterectomy (corpus and cervix), with or without removal of tube(s), with or without removal of ovary(s); (CPT4 86671).  Ligation or transection of fallopian tube(s), abdominal or vaginal approach, unilateral or bilateral (CPT4 25805).  CEA - Carotid endarterectomy (SNOMED CT 2698011904).  Comments:  2/27/2013 14:57 - Christopher Kidd RN.  left  fem pop bypass.  Breast lumpectomy (SNOMED CT 6897670365).  Comments:  2/27/2013 14:59 - Marti HUTTON, Christopher BANKS.  bilateral    Appendectomy (SNOMED CT 747439924).  lumbar fusion.  Angiogram (SNOMED CT 455150131).  RT AKA.  Cholecystectomy; (CPT4 91269).   Social History        Social & Psychosocial Habits    Alcohol  03/23/2013 Risk Assessment: Denies Alcohol  Use    02/22/2016  Use: Current    Frequency: 1-2 times per year    Substance Abuse  02/27/2013 Risk Assessment: Denies Substance Abuse    02/22/2016  Use: Never    Tobacco  03/23/2013 Risk Assessment: Low Risk    02/22/2016  Use: Former smoker    Stopped at age: 57 Years  .        Physical Examination   Vital Signs   8/15/2017 16:00 CDT      Temperature Oral          36.7 DegC                             Peripheral Pulse Rate     66 bpm                             Respiratory Rate          20 br/min                             SpO2                      98 %                             Systolic Blood Pressure   106 mmHg                             Diastolic Blood Pressure  56 mmHg  LOW                             Mean Arterial Pressure, Cuff              73 mmHg     Intake and Output   Fluid Balance Primitives   8/15/2017 7:00 CDT       Oral Intake               640 mL                             Urine Voided              600 mL        General:  Alert and oriented, Moderate distress.    Respiratory:  Lungs are clear to auscultation, Respirations are non-labored, Breath sounds are equal.    Cardiovascular:  Normal rate, Regular rhythm, No murmur.    Gastrointestinal:  Soft, Non-tender, Non-distended, Normal bowel sounds.    Musculoskeletal:  Right AKA, Moving all extremities.    Integumentary:  Warm.    Neurologic:  Alert, Oriented.    Cognition and Speech:  Oriented, Speech clear and coherent.    Psychiatric:  Cooperative, Appropriate mood & affect, Normal judgment.       Review / Management   Laboratory Results   Today's Lab Results : PowerNote Discrete Results   8/15/2017 17:00 CDT      UA Appear                 CLEAR                             UA Color                  YELLOW                             UA Spec Grav              1.010                             UA Bili                   Negative                             UA pH                     7.0                             UA Urobilinogen           0.2                              UA Blood                  Negative                             UA Glucose                Negative                             UA Ketones                Negative                             UA Protein                Negative                             UA Nitrite                Negative                             UA Leuk Est               Negative                             UA WBC                    0-3 /HPF                             UA RBC                    1-4                             UA Bacteria               None Seen                             UA Squam Epithelial       Few    8/15/2017 11:14 CDT      WBC                       7.63 x10(3)/mcL                             RBC                       3.92 x10(6)/mcL                             Hgb                       12.6 gm/dL                             Hct                       38.9 %                             Platelet                  286 x10(3)/mcL                             MCV                       99.2 fL  HI                             MCH                       32.1 pg                             MCHC                      32.4 gm/dL                             RDW                       12  NA                             MPV                       9  NA                             Abs Neut                  4.90  NA                             Neutro Auto               64.2 %  NA                             Lymph Auto                26.7 %  NA                             Mono Auto                 6.0 %  NA                             Eos Auto                  2.5 %  NA                             Abs Eos                   0.19 x10(3)/mcL  NA                             Basophil Auto             0.3 %  NA                             Abs Neutro                4.90 x10(3)/mcL  NA                             Abs Lymph                 2.04 x10(3)/mcL  NA                             Abs Mono                  0.46 x10(3)/mcL  NA                              Abs Baso                  0.02 x10(3)/mcL  NA                             IG%                       0.3 %                             IG#                       0.0200                             Sodium Lvl                143 mmol/L                             Potassium Lvl             4.9 mmol/L                             Chloride                  103 mmol/L                             CO2                       33.3 mmol/L  HI                             Calcium Lvl               9.1 mg/dL                             Glucose Lvl               189 mg/dL  HI                             BUN                       13 mg/dL                             Creatinine                0.71 mg/dL                             eGFR-AA                   >60 mL/min/1.73 m2  NA                             eGFR-TONY                  >60 mL/min/1.73 m2  NA                             Bili Total                0.5                             Bili Direct               N/A mg/dL                             AST                       32 unit/L                             ALT                       39 unit/L                             Alk Phos                  79 unit/L                             Total Protein             6.5 gm/dL                             Albumin Lvl               3.4 gm/dL                             Globulin                  3.1 gm/dL                             A/G Ratio                 1.1 ratio           Impression and Plan   Diagnosis     Intractable back pain (ETC60-ZW M54.9).     DM (diabetes mellitus) (ZRK88-EO E11.9).     Course:    1.  Intractable back pain  Admit patient for observation  Start IV pain medication to include Dilaudid  Monitor  Bed rest at this time    2.  Diabetes  Continue home medication  Monitor CBGs  Insulin sliding scale as needed.

## 2022-04-30 NOTE — OP NOTE
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     Procedures Performed: Aortogram with left leg runoff.     Indications: The patient has a left femoral to anterior tibial bypass with cadaver vein.  She has elevated velocities on ultrasound and is suspicious for a stenosis of her graft.  She is a candidate for angiogram with possible intervention..     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     Surgeon: John Cordoba MD.     Description of Procedure/Findings/    Complications: With the patient on the table in the supine position, the right groin was prepped and draped in the standard sterile technique.  1% lidocaine was used for local esthesia.  Using ultrasound guidance, images were saved.  We accessed the common femoral artery.  A wire was placed using the Seldinger technique.  A flush cath was placed into the aorta and aortogram was obtained.  We then accessed the left external iliac artery and left leg runoff was obtained.  Runoff of the left leg was performed.  At the end the case, the sheath was removed and direct pressure was held.  Good hemostasis was noted..     Findings: The infrarenal aorta was patent.  The iliacs were patent.  The internal iliacs were patent.  The external iliacs were patent.  On the left side the common femoral and profundofemoral patent.  There was a femoral to anterior tibial bypass which was patent.  There is no evidence of stenosis within  the graft.  There was interestingly a stent noted in the distal outflow of the graft.  There was good flow into the anterior tibial artery.  There is no evidence of stenosis..     Complications: None.     Notes: The patient will be discharged home to follow-up with me in the office as an outpatient..
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
## 2022-04-30 NOTE — OP NOTE
Patient:   Starr Kidd            MRN: [id number]            FIN: [id number]               Age:   66 years     Sex:  Female     :  1955   Associated Diagnoses:   Inflamed seborrheic keratosis of right cheek   Author:   Elizabeth Farley MD      Operative Note   Operative Information   Date/ Time:  12/15/2021 09:01:00.     Procedures Performed: Procedure Code   Excision, other benign lesion including margins, except skin tag (unless listed elsewhere), face, ears, eyelids, nose, lips, mucous membrane; excised diameter 1.1 to 2.0 cm (49921)..     Indications: 63-year-old female with enlarging inflamed and symptomatic seborrheic keratosis of the right face elected to undergo excision.     Preoperative Diagnosis: Inflamed seborrheic keratosis of right cheek (DML08-GQ L82.0).     Postoperative Diagnosis: Inflamed seborrheic keratosis of right cheek (QJP00-XW L82.0).     Surgeon: Elizabeth Farley MD.     Anesthesia: General.     Speciman Removed: 3 x 1.5 cm ellipse of skin with centralized lesion.     Description of Procedure/Findings/    Complications: Patient was brought to the operative theater laid in the left decubitus position with the right face forward.  Face was then sterilely prepped and draped in normal surgical fashion using Betadine.  An inflamed and friable seborrheic keratosis of the right face was identified.  Margins were then demarcated.  The region was then infiltrated 1% lidocaine and epinephrine.  15 blade was used to incise the skin elliptical fashion surrounding this region with dissection down to underlying fatty tissues.  It was located along the right cheek.  Upon complete extraction the cavity was made hemostatic using Bovie cauterization.  The wound edges were then reapproximated in a multilayered fashion using 3-0 Vicryl and running 4-0 subcuticular Monocryl.  Sterile bandages then placed upon the wound.  Patient was then relieved of anesthesia stable condition and  transferred postanesthesia care unit..     Esimated blood loss: loss  3  cc.     Complications: None.

## 2022-05-04 NOTE — HISTORICAL [facility]ER
This is a historical note converted from [employer]. Formatting and pictures may have been removed.  Please reference [employer] for original formatting and attached multimedia. Type of Procedure: Left?sacroiliac Joint Injection  ?  Physician: Cristiano Farley III, MD  ?  Diagnosis: SI joint dysfunction, Sacroiliitis  ?  Description of Procedure:  After appropriate informed consent discussing the risks, benefits and possible complications, the patient was taken to the procedure suite. NIBP, pulse rate, and oxygen saturation were monitored throughout the procedure.?  ?  The patient was positioned in the prone position. Skin was prepped and draped in a sterile fashion. The SI joint was identified fluoroscopically via an oblique view. The skin was anesthetized with 3 cc of 1% lidocaine. At this point, a 22-gauge 3.5 inch spinal needle was atraumatically introduced and advanced under fluoroscopic guidance to the inferior aspect of the SI joint. ?Aspiration was performed without any?blood return. ?Patient has a allergy to contrast dye?so contrast was not used for the injection. ?Patient has reported allergy to Decadron,?but she reports?developing anaphylactic reaction?which was reversed. ?I suspect this reaction was due to another medication. ?She reports having taken methylprednisolone?in the past without complications. ?Patient agrees to proceed and will be monitored closely intraoperatively and postoperatively. ?At this point, 80 mg of Depo-Medrol mixed with?2 mL of?0.25% bupivacaine?was injected without complication.  ?  The needle was re-styletted and removed. Adhesive dressing was applied over the site. Patient tolerated the procedure well without any apparent complications. The patient was transferred back to the recovery area and then discharged home.

## 2022-05-05 NOTE — HISTORICAL [facility]
This is a historical note converted from [employer]. Formatting and pictures may have been removed.  Please reference [employer] for original formatting and attached multimedia. Type of Procedure: Fluoroscopically Guided Cervical Interlaminar Epidural Steroid Injection  ?  Physician: Cristiano Farley III, MD  ?  Diagnosis: Cervical radiculopathy  ?  Description of Procedure:  After appropriate informed consent discussing the risks, benefits and possible complications, the patient was taken to the procedure suite. NIBP, pulse rate, and oxygen saturation were monitored throughout the procedure.?  ?  The patient was positioned prone and prepped and draped in a sterile fashion. The C7-T1 interspace was identified fluoroscopically. The skin was anesthetized via 25-gauge 1.5 needle with approximately 3 cc of 1% lidocaine. A 20-gauge Tuohy needle was atraumatically introduced and advanced under fluoroscopic guidance. Using loss of resistance technique with 0.9 % preservative free normal saline the epidural space was entered without difficulties. Following negative aspiration for blood and CSF and confirming the absence of paresthesias, injection of approximately 1.5 cc of Omnipaque 300 demonstrated excellent epidural spread without vascular or intrathecal uptake. At this point, 80mg of DepoMedrol followed by 1ml of PF NS was injected without complication.  ?  The needle was re-styletted and removed. Adhesive dressing was applied over the site. Patient tolerated the procedure well without any apparent complications. The patient was transferred back to the recovery area and then discharged home.

## 2022-06-14 RX ORDER — INSULIN GLARGINE 100 [IU]/ML
INJECTION, SOLUTION SUBCUTANEOUS
Qty: 30 ML | Refills: 3 | Status: SHIPPED | OUTPATIENT
Start: 2022-06-14 | End: 2022-06-15 | Stop reason: SDUPTHER

## 2022-06-14 RX ORDER — BLOOD SUGAR DIAGNOSTIC
STRIP MISCELLANEOUS
Qty: 100 STRIP | Refills: 3 | Status: SHIPPED | OUTPATIENT
Start: 2022-06-14 | End: 2023-03-29

## 2022-06-15 RX ORDER — INSULIN GLARGINE 100 [IU]/ML
30 INJECTION, SOLUTION SUBCUTANEOUS DAILY
Qty: 30 ML | Refills: 3 | Status: SHIPPED | OUTPATIENT
Start: 2022-06-15 | End: 2023-08-16

## 2022-07-10 ENCOUNTER — HOSPITAL ENCOUNTER (EMERGENCY)
Facility: HOSPITAL | Age: 67
Discharge: HOME | End: 2022-07-10
Attending: STUDENT IN AN ORGANIZED HEALTH CARE EDUCATION/TRAINING PROGRAM
Payer: MEDICARE

## 2022-07-10 VITALS
RESPIRATION RATE: 16 BRPM | WEIGHT: 180 LBS | OXYGEN SATURATION: 98 % | SYSTOLIC BLOOD PRESSURE: 150 MMHG | TEMPERATURE: 99 F | BODY MASS INDEX: 30.73 KG/M2 | DIASTOLIC BLOOD PRESSURE: 83 MMHG | HEIGHT: 64 IN | HEART RATE: 75 BPM

## 2022-07-10 DIAGNOSIS — M25.552 LEFT HIP PAIN: ICD-10-CM

## 2022-07-10 DIAGNOSIS — R07.89 BURNING CHEST PAIN: ICD-10-CM

## 2022-07-10 LAB
ALBUMIN SERPL-MCNC: 3.8 GM/DL (ref 3.4–4.8)
ALBUMIN/GLOB SERPL: 1 RATIO (ref 1.1–2)
ALP SERPL-CCNC: 77 UNIT/L (ref 40–150)
ALT SERPL-CCNC: 25 UNIT/L (ref 0–55)
AST SERPL-CCNC: 32 UNIT/L (ref 5–34)
BASOPHILS # BLD AUTO: 0.04 X10(3)/MCL (ref 0–0.2)
BASOPHILS NFR BLD AUTO: 0.5 %
BILIRUBIN DIRECT+TOT PNL SERPL-MCNC: 0.4 MG/DL
BUN SERPL-MCNC: 16 MG/DL (ref 9.8–20.1)
CALCIUM SERPL-MCNC: 9.7 MG/DL (ref 8.4–10.2)
CHLORIDE SERPL-SCNC: 103 MMOL/L (ref 98–107)
CK SERPL-CCNC: 78 U/L (ref 29–168)
CO2 SERPL-SCNC: 27 MMOL/L (ref 23–31)
CREAT SERPL-MCNC: 0.88 MG/DL (ref 0.55–1.02)
CRP SERPL-MCNC: 8.9 MG/L
EOSINOPHIL # BLD AUTO: 0.17 X10(3)/MCL (ref 0–0.9)
EOSINOPHIL NFR BLD AUTO: 2.3 %
ERYTHROCYTE [DISTWIDTH] IN BLOOD BY AUTOMATED COUNT: 12.9 % (ref 11.5–17)
GLOBULIN SER-MCNC: 3.9 GM/DL (ref 2.4–3.5)
GLUCOSE SERPL-MCNC: 187 MG/DL (ref 82–115)
HCT VFR BLD AUTO: 42.6 % (ref 37–47)
HGB BLD-MCNC: 14.1 GM/DL (ref 12–16)
IMM GRANULOCYTES # BLD AUTO: 0.01 X10(3)/MCL (ref 0–0.04)
IMM GRANULOCYTES NFR BLD AUTO: 0.1 %
LIPASE SERPL-CCNC: 27 U/L
LYMPHOCYTES # BLD AUTO: 2.81 X10(3)/MCL (ref 0.6–4.6)
LYMPHOCYTES NFR BLD AUTO: 38.5 %
MCH RBC QN AUTO: 31 PG (ref 27–31)
MCHC RBC AUTO-ENTMCNC: 33.1 MG/DL (ref 33–36)
MCV RBC AUTO: 93.6 FL (ref 80–94)
MONOCYTES # BLD AUTO: 0.45 X10(3)/MCL (ref 0.1–1.3)
MONOCYTES NFR BLD AUTO: 6.2 %
NEUTROPHILS # BLD AUTO: 3.8 X10(3)/MCL (ref 2.1–9.2)
NEUTROPHILS NFR BLD AUTO: 52.4 %
NRBC BLD AUTO-RTO: 0 %
PLATELET # BLD AUTO: 286 X10(3)/MCL (ref 130–400)
PMV BLD AUTO: 8.9 FL (ref 7.4–10.4)
POTASSIUM SERPL-SCNC: 4.4 MMOL/L (ref 3.5–5.1)
PROT SERPL-MCNC: 7.7 GM/DL (ref 5.8–7.6)
RBC # BLD AUTO: 4.55 X10(6)/MCL (ref 4.2–5.4)
SODIUM SERPL-SCNC: 143 MMOL/L (ref 136–145)
TROPONIN I SERPL-MCNC: <0.01 NG/ML (ref 0–0.04)
WBC # SPEC AUTO: 7.3 X10(3)/MCL (ref 4.5–11.5)

## 2022-07-10 PROCEDURE — 96372 THER/PROPH/DIAG INJ SC/IM: CPT | Performed by: STUDENT IN AN ORGANIZED HEALTH CARE EDUCATION/TRAINING PROGRAM

## 2022-07-10 PROCEDURE — 82550 ASSAY OF CK (CPK): CPT | Performed by: STUDENT IN AN ORGANIZED HEALTH CARE EDUCATION/TRAINING PROGRAM

## 2022-07-10 PROCEDURE — 96374 THER/PROPH/DIAG INJ IV PUSH: CPT

## 2022-07-10 PROCEDURE — 25000003 PHARM REV CODE 250: Performed by: STUDENT IN AN ORGANIZED HEALTH CARE EDUCATION/TRAINING PROGRAM

## 2022-07-10 PROCEDURE — 83690 ASSAY OF LIPASE: CPT | Performed by: STUDENT IN AN ORGANIZED HEALTH CARE EDUCATION/TRAINING PROGRAM

## 2022-07-10 PROCEDURE — 96375 TX/PRO/DX INJ NEW DRUG ADDON: CPT

## 2022-07-10 PROCEDURE — 93005 ELECTROCARDIOGRAM TRACING: CPT

## 2022-07-10 PROCEDURE — 36415 COLL VENOUS BLD VENIPUNCTURE: CPT | Performed by: STUDENT IN AN ORGANIZED HEALTH CARE EDUCATION/TRAINING PROGRAM

## 2022-07-10 PROCEDURE — 86140 C-REACTIVE PROTEIN: CPT | Performed by: STUDENT IN AN ORGANIZED HEALTH CARE EDUCATION/TRAINING PROGRAM

## 2022-07-10 PROCEDURE — 63600175 PHARM REV CODE 636 W HCPCS: Performed by: STUDENT IN AN ORGANIZED HEALTH CARE EDUCATION/TRAINING PROGRAM

## 2022-07-10 PROCEDURE — 99285 EMERGENCY DEPT VISIT HI MDM: CPT | Mod: 25

## 2022-07-10 PROCEDURE — 84484 ASSAY OF TROPONIN QUANT: CPT | Performed by: STUDENT IN AN ORGANIZED HEALTH CARE EDUCATION/TRAINING PROGRAM

## 2022-07-10 PROCEDURE — 80053 COMPREHEN METABOLIC PANEL: CPT | Performed by: STUDENT IN AN ORGANIZED HEALTH CARE EDUCATION/TRAINING PROGRAM

## 2022-07-10 PROCEDURE — 99900031 HC PATIENT EDUCATION (STAT)

## 2022-07-10 PROCEDURE — 85025 COMPLETE CBC W/AUTO DIFF WBC: CPT | Performed by: STUDENT IN AN ORGANIZED HEALTH CARE EDUCATION/TRAINING PROGRAM

## 2022-07-10 RX ORDER — MELOXICAM 7.5 MG/1
7.5 TABLET ORAL DAILY
COMMUNITY
Start: 2022-06-22 | End: 2022-07-11 | Stop reason: SDUPTHER

## 2022-07-10 RX ORDER — PEN NEEDLE, DIABETIC 32 GX 1/4"
1 NEEDLE, DISPOSABLE MISCELLANEOUS 2 TIMES DAILY
COMMUNITY
Start: 2022-03-21 | End: 2023-10-05

## 2022-07-10 RX ORDER — METOCLOPRAMIDE HYDROCHLORIDE 5 MG/ML
10 INJECTION INTRAMUSCULAR; INTRAVENOUS
Status: COMPLETED | OUTPATIENT
Start: 2022-07-10 | End: 2022-07-10

## 2022-07-10 RX ORDER — ONDANSETRON 4 MG/1
4 TABLET, ORALLY DISINTEGRATING ORAL
Status: DISCONTINUED | OUTPATIENT
Start: 2022-07-10 | End: 2022-07-10 | Stop reason: HOSPADM

## 2022-07-10 RX ORDER — BLOOD-GLUCOSE METER
EACH MISCELLANEOUS
COMMUNITY
Start: 2022-04-12

## 2022-07-10 RX ORDER — CHOLESTYRAMINE LIGHT 4 G/5.7G
1 POWDER, FOR SUSPENSION ORAL EVERY MORNING
COMMUNITY
Start: 2022-06-25 | End: 2024-01-04 | Stop reason: SDUPTHER

## 2022-07-10 RX ORDER — INSULIN LISPRO 100 [IU]/ML
INJECTION, SUSPENSION SUBCUTANEOUS
COMMUNITY
End: 2023-06-29

## 2022-07-10 RX ORDER — CETIRIZINE HYDROCHLORIDE 10 MG/1
TABLET ORAL
COMMUNITY
End: 2023-06-29

## 2022-07-10 RX ORDER — MAG HYDROX/ALUMINUM HYD/SIMETH 200-200-20
30 SUSPENSION, ORAL (FINAL DOSE FORM) ORAL
Status: COMPLETED | OUTPATIENT
Start: 2022-07-10 | End: 2022-07-10

## 2022-07-10 RX ORDER — METHOCARBAMOL 500 MG/1
1000 TABLET, FILM COATED ORAL
Status: COMPLETED | OUTPATIENT
Start: 2022-07-10 | End: 2022-07-10

## 2022-07-10 RX ORDER — KETOROLAC TROMETHAMINE 30 MG/ML
30 INJECTION, SOLUTION INTRAMUSCULAR; INTRAVENOUS
Status: COMPLETED | OUTPATIENT
Start: 2022-07-10 | End: 2022-07-10

## 2022-07-10 RX ORDER — ACETAMINOPHEN 500 MG
TABLET ORAL
COMMUNITY

## 2022-07-10 RX ORDER — DIPHENHYDRAMINE HYDROCHLORIDE 50 MG/ML
12.5 INJECTION INTRAMUSCULAR; INTRAVENOUS
Status: COMPLETED | OUTPATIENT
Start: 2022-07-10 | End: 2022-07-10

## 2022-07-10 RX ORDER — KETOROLAC TROMETHAMINE 30 MG/ML
30 INJECTION, SOLUTION INTRAMUSCULAR; INTRAVENOUS
Status: DISCONTINUED | OUTPATIENT
Start: 2022-07-10 | End: 2022-07-10

## 2022-07-10 RX ORDER — SYRGE-NDL,INS 0.5 ML HALF MARK 30 G X1/2"
SYRINGE, EMPTY DISPOSABLE MISCELLANEOUS
COMMUNITY
Start: 2022-06-22 | End: 2023-03-09

## 2022-07-10 RX ORDER — BLOOD GLUCOSE CONTROL HIGH,LOW
EACH MISCELLANEOUS
COMMUNITY
Start: 2022-04-12

## 2022-07-10 RX ORDER — ISOPROPYL ALCOHOL 0.75 G/1
SWAB TOPICAL
COMMUNITY
Start: 2022-04-06

## 2022-07-10 RX ORDER — FAMOTIDINE 20 MG/1
20 TABLET, FILM COATED ORAL 2 TIMES DAILY
Qty: 20 TABLET | Refills: 0 | Status: SHIPPED | OUTPATIENT
Start: 2022-07-10 | End: 2023-06-29

## 2022-07-10 RX ORDER — ROSUVASTATIN CALCIUM 5 MG/1
5 TABLET, COATED ORAL NIGHTLY
COMMUNITY
Start: 2022-06-29 | End: 2023-03-09

## 2022-07-10 RX ORDER — IBUPROFEN 600 MG/1
600 TABLET ORAL EVERY 6 HOURS PRN
Qty: 20 TABLET | Refills: 0 | Status: SHIPPED | OUTPATIENT
Start: 2022-07-10 | End: 2022-07-15

## 2022-07-10 RX ORDER — LEVOCETIRIZINE DIHYDROCHLORIDE 5 MG/1
5 TABLET, FILM COATED ORAL NIGHTLY
COMMUNITY
Start: 2022-06-29 | End: 2023-03-09

## 2022-07-10 RX ADMIN — DIPHENHYDRAMINE HYDROCHLORIDE 12.5 MG: 50 INJECTION INTRAMUSCULAR; INTRAVENOUS at 04:07

## 2022-07-10 RX ADMIN — METOCLOPRAMIDE 10 MG: 5 INJECTION, SOLUTION INTRAMUSCULAR; INTRAVENOUS at 04:07

## 2022-07-10 RX ADMIN — ALUMINUM HYDROXIDE, MAGNESIUM HYDROXIDE, AND SIMETHICONE 30 ML: 200; 200; 20 SUSPENSION ORAL at 07:07

## 2022-07-10 RX ADMIN — KETOROLAC TROMETHAMINE 30 MG: 30 INJECTION, SOLUTION INTRAMUSCULAR at 02:07

## 2022-07-10 RX ADMIN — METHOCARBAMOL 1000 MG: 500 TABLET ORAL at 02:07

## 2022-07-10 NOTE — ED PROVIDER NOTES
Encounter Date: 7/10/2022       History     Chief Complaint   Patient presents with    Hip Pain    Back Pain    Nausea     Hip pain that goes up her back causing her to have headache     66-year-old female with past medical history of insulin dependent home on diabetes, CAD s/p PCI, HLD, 14 surgeries including a carotid endarterectomy, right lower extremity amputation(AKA) presents to the emergency department complaining of left hip pain x3 days.  The pain is sharp and burning and radiates from her left hip upwards causing her have nausea, acid reflux, headache described as pressure.  She tells me that she often has flare ups of her left hip pain, that she tried Tylenol and Pepto-Bismol at home without relief.  She has been fatigued today but denies numbness, vomiting, focal weakness.  She had diarrhea dysuria last week but those symptoms resolved with azo.  She reports having many allergies.    The history is provided by the patient.     Review of patient's allergies indicates:   Allergen Reactions    Bethanechol Shortness Of Breath    Codeine Shortness Of Breath     Other reaction(s): ANAPHYLAXIS    Iodine and iodide containing products Anaphylaxis and Swelling     Other reaction(s): Respiratory Distress    Meperidine Shortness Of Breath     Other reaction(s): Respiratory Distress    Penicillins Anaphylaxis    Pork derived (porcine) Anaphylaxis and Nausea And Vomiting    Sulfa (sulfonamide antibiotics)      Other reaction(s): anaphylactic  Other reaction(s): Respiratory Distress    Ciprofloxacin      Other reaction(s): UNABLL TO EXPLAIN SIDE EFFECTS    Dexamethasone Itching     Other reaction(s): Respiratory Distress    Ivp dye [iodinated contrast media]      Other reaction(s): anaphylaxis    Propoxyphene n-acetaminophen      Other reaction(s): SOB    Saccharomyces cerevisiae      Other reaction(s): RASH    Egg derived Rash    Morphine Nausea And Vomiting     Past Medical History:   Diagnosis Date     Diabetes mellitus     GERD (gastroesophageal reflux disease)      Past Surgical History:   Procedure Laterality Date    LEG AMPUTATION THROUGH KNEE       No family history on file.  Social History     Tobacco Use    Smoking status: Never Smoker    Smokeless tobacco: Never Used   Substance Use Topics    Drug use: Never     Review of Systems   Constitutional: Positive for fatigue.   Gastrointestinal: Positive for diarrhea and nausea. Negative for vomiting.   Genitourinary: Positive for dysuria.   Musculoskeletal:        Severe hip pain   Neurological: Positive for headaches. Negative for weakness and numbness.   All other systems reviewed and are negative.      Physical Exam     Initial Vitals [07/10/22 1334]   BP Pulse Resp Temp SpO2   (!) 253/95 88 18 98.2 °F (36.8 °C) 97 %      MAP       --         Physical Exam    Constitutional: She appears well-developed. She is not diaphoretic. No distress.   HENT:   Head: Normocephalic and atraumatic.   Eyes: EOM are normal.   Cardiovascular: Normal rate and regular rhythm.   Murmur (loud systolic) heard.  Pulmonary/Chest: No tachypnea. No respiratory distress. She has no wheezes. She has no rhonchi. She has no rales.   Abdominal: Abdomen is soft. There is no abdominal tenderness. There is no rebound.   Musculoskeletal:      Comments: Tenderness to palpation of left gluteal region, left hip.  No pain with full range of motion of left hip or knee.  Right AKA     Neurological: She is alert and oriented to person, place, and time.   Skin: Skin is warm and dry. Capillary refill takes less than 2 seconds. Rash (faint rash of L lower leg(pt reports from shaving)) noted.         ED Course   Procedures  Labs Reviewed   COMPREHENSIVE METABOLIC PANEL - Abnormal; Notable for the following components:       Result Value    Glucose Level 187 (*)     Protein Total 7.7 (*)     Globulin 3.9 (*)     Albumin/Globulin Ratio 1.0 (*)     All other components within normal limits   CK -  Normal   TROPONIN I - Normal   LIPASE - Normal   CBC W/ AUTO DIFFERENTIAL    Narrative:     The following orders were created for panel order CBC auto differential.  Procedure                               Abnormality         Status                     ---------                               -----------         ------                     CBC with Differential[[id number]]                            Final result                 Please view results for these tests on the individual orders.   CBC WITH DIFFERENTIAL   C-REACTIVE PROTEIN        ECG Results          EKG 12-lead (Preliminary result)  Result time 07/10/22 16:34:18    ED Interpretation by Manfred Villafana MD (07/10/22 16:34:18, [facility] - Emergency Dept, Emergency Medicine)    Normal sinus rhythm at a rate of 74 beats per minute.  CO interval, QRS duration, axis, QTC within normal limits.  No significant ST elevation or depression.  No hyperacute or peaked T-waves.                  In process by Interface, Lab In [facility] (07/10/22 16:25:51)                 Narrative:    Test Reason : R07.89,    Vent. Rate : 074 BPM     Atrial Rate : 074 BPM     P-R Int : 124 ms          QRS Dur : 096 ms      QT Int : 384 ms       P-R-T Axes : 026 066 060 degrees     QTc Int : 426 ms    Normal sinus rhythm  Normal ECG  No previous ECGs available    Referred By: [email or web address]   SELF           Confirmed By:                             Imaging Results          X-Ray Chest AP Portable (In process)                X-Ray Hip 2 or 3 views Left (with Pelvis when performed) (In process)                  Medications   ondansetron disintegrating tablet 4 mg (4 mg Oral Not Given 7/10/22 1541)   aluminum-magnesium hydroxide-simethicone 200-200-20 mg/5 mL suspension 30 mL (has no administration in time range)   methocarbamoL tablet 1,000 mg (1,000 mg Oral Given 7/10/22 1423)   ketorolac injection 30 mg (30 mg Intramuscular Given 7/10/22 1423)   metoclopramide HCl injection 10 mg (10 mg  Intravenous Given 7/10/22 1642)   diphenhydrAMINE injection 12.5 mg (12.5 mg Intravenous Given 7/10/22 1639)     Medical Decision Making:   Initial Assessment:   66-year-old female with past medical history of insulin dependent home on diabetes, CAD s/p PCI, HLD, 14 surgeries including a carotid endarterectomy, right lower extremity amputation(AKA) presents to the emergency department complaining of left hip pain x3 days.   Differential Diagnosis:   Arthritis, musculoskeletal pain, dyspepsia; very low suspicion for septic joint, ACS  ED Management:  Patient reports that she has had some improvement albeit is still in pain.  She is reassured by her workup and is agreeable with plan to discharge home with instructions to use over-the-counter medications to control her pain and follow-up with her primary care provider.  I have counseled her regarding return precautions.             ED Course as of 07/10/22 1914   Sun Jul 10, 2022   1609 On re-evaluation patient tells me that her pain is decreased from an 8 to a 5, however she states that this is still significant and she is agreeable with plan to expand the workup by obtaining routine labs, ECG, x-rays. [CW]   1909 Patient reports that she has had some improvement albeit is still in pain.  She is reassured by our workup and is agreeable with plan to discharge home with instructions to use over-the-counter medications to control her pain and follow-up with her primary care provider.  I have counseled her regarding return precautions. [CW]      ED Course User Index  [CW] Manfred Villafana MD             Clinical Impression:   Final diagnoses:  [R07.89] Burning chest pain  [M25.552] Left hip pain          ED Disposition Condition    Discharge Stable        ED Prescriptions     Medication Sig Dispense Start Date End Date Auth. Provider    famotidine (PEPCID) 20 MG tablet Take 1 tablet (20 mg total) by mouth 2 (two) times daily. for 10 days 20 tablet 7/10/2022 7/20/2022  Manfred Villafana MD    ibuprofen (ADVIL,MOTRIN) 600 MG tablet Take 1 tablet (600 mg total) by mouth every 6 (six) hours as needed for Pain. 20 tablet 7/10/2022 7/15/2022 Manfred Villafana MD        Follow-up Information     Follow up With Specialties Details Why Contact Info    Obey Baker MD Family Medicine Call in 1 day For follow-up of today's complaints [address]  [facility] [address]  [phone]      [facility] Emergency Dept Emergency Medicine Go to  If symptoms worsen [address] [facility] [id number] [phone]           Manfred Villafana MD  07/10/22 5681

## 2022-07-11 ENCOUNTER — TELEPHONE (OUTPATIENT)
Dept: FAMILY MEDICINE | Facility: CLINIC | Age: 67
End: 2022-07-11
Payer: MEDICARE

## 2022-07-11 DIAGNOSIS — R52 PAIN: Primary | ICD-10-CM

## 2022-07-11 RX ORDER — MELOXICAM 7.5 MG/1
15 TABLET ORAL DAILY
Qty: 30 TABLET | Refills: 1 | Status: SHIPPED | OUTPATIENT
Start: 2022-07-11 | End: 2022-07-14 | Stop reason: SDUPTHER

## 2022-07-11 NOTE — TELEPHONE ENCOUNTER
----- Message from Adrienne Momin sent at 7/11/2022 10:17 AM CDT -----  Wants to know if she can take melozicam today (during the day)?

## 2022-07-14 ENCOUNTER — OFFICE VISIT (OUTPATIENT)
Dept: FAMILY MEDICINE | Facility: CLINIC | Age: 67
End: 2022-07-14
Payer: MEDICARE

## 2022-07-14 VITALS
HEART RATE: 85 BPM | SYSTOLIC BLOOD PRESSURE: 142 MMHG | BODY MASS INDEX: 30.73 KG/M2 | OXYGEN SATURATION: 100 % | HEIGHT: 64 IN | TEMPERATURE: 97 F | WEIGHT: 180 LBS | RESPIRATION RATE: 18 BRPM | DIASTOLIC BLOOD PRESSURE: 70 MMHG

## 2022-07-14 DIAGNOSIS — M25.552 LEFT HIP PAIN: Primary | ICD-10-CM

## 2022-07-14 DIAGNOSIS — R52 PAIN: Primary | ICD-10-CM

## 2022-07-14 PROBLEM — I25.10 ARTERIOSCLEROSIS OF CORONARY ARTERY: Status: ACTIVE | Noted: 2022-07-14

## 2022-07-14 PROBLEM — E11.9 DIABETES MELLITUS: Status: ACTIVE | Noted: 2022-07-14

## 2022-07-14 PROBLEM — I73.9 PERIPHERAL VASCULAR DISEASE: Status: ACTIVE | Noted: 2022-07-14

## 2022-07-14 PROBLEM — E78.5 DYSLIPIDEMIA: Status: ACTIVE | Noted: 2022-07-14

## 2022-07-14 PROBLEM — G62.9 NEUROPATHY: Status: ACTIVE | Noted: 2022-07-14

## 2022-07-14 PROBLEM — I77.9 PERIPHERAL ARTERIAL OCCLUSIVE DISEASE: Status: ACTIVE | Noted: 2022-07-14

## 2022-07-14 PROBLEM — M19.90 OSTEOARTHRITIS: Status: ACTIVE | Noted: 2022-07-14

## 2022-07-14 PROBLEM — I82.409 DEEP VEIN THROMBOSIS (DVT): Status: ACTIVE | Noted: 2022-07-14

## 2022-07-14 PROCEDURE — 3008F BODY MASS INDEX DOCD: CPT | Mod: CPTII,,, | Performed by: FAMILY MEDICINE

## 2022-07-14 PROCEDURE — 3008F PR BODY MASS INDEX (BMI) DOCUMENTED: ICD-10-PCS | Mod: CPTII,,, | Performed by: FAMILY MEDICINE

## 2022-07-14 PROCEDURE — 3288F PR FALLS RISK ASSESSMENT DOCUMENTED: ICD-10-PCS | Mod: CPTII,,, | Performed by: FAMILY MEDICINE

## 2022-07-14 PROCEDURE — 3077F SYST BP >= 140 MM HG: CPT | Mod: CPTII,,, | Performed by: FAMILY MEDICINE

## 2022-07-14 PROCEDURE — 1160F PR REVIEW ALL MEDS BY PRESCRIBER/CLIN PHARMACIST DOCUMENTED: ICD-10-PCS | Mod: CPTII,,, | Performed by: FAMILY MEDICINE

## 2022-07-14 PROCEDURE — 99214 PR OFFICE/OUTPT VISIT, EST, LEVL IV, 30-39 MIN: ICD-10-PCS | Mod: ,,, | Performed by: FAMILY MEDICINE

## 2022-07-14 PROCEDURE — 1159F MED LIST DOCD IN RCRD: CPT | Mod: CPTII,,, | Performed by: FAMILY MEDICINE

## 2022-07-14 PROCEDURE — 1159F PR MEDICATION LIST DOCUMENTED IN MEDICAL RECORD: ICD-10-PCS | Mod: CPTII,,, | Performed by: FAMILY MEDICINE

## 2022-07-14 PROCEDURE — 3288F FALL RISK ASSESSMENT DOCD: CPT | Mod: CPTII,,, | Performed by: FAMILY MEDICINE

## 2022-07-14 PROCEDURE — 1160F RVW MEDS BY RX/DR IN RCRD: CPT | Mod: CPTII,,, | Performed by: FAMILY MEDICINE

## 2022-07-14 PROCEDURE — 3078F PR MOST RECENT DIASTOLIC BLOOD PRESSURE < 80 MM HG: ICD-10-PCS | Mod: CPTII,,, | Performed by: FAMILY MEDICINE

## 2022-07-14 PROCEDURE — 3077F PR MOST RECENT SYSTOLIC BLOOD PRESSURE >= 140 MM HG: ICD-10-PCS | Mod: CPTII,,, | Performed by: FAMILY MEDICINE

## 2022-07-14 PROCEDURE — 1101F PT FALLS ASSESS-DOCD LE1/YR: CPT | Mod: CPTII,,, | Performed by: FAMILY MEDICINE

## 2022-07-14 PROCEDURE — 99214 OFFICE O/P EST MOD 30 MIN: CPT | Mod: ,,, | Performed by: FAMILY MEDICINE

## 2022-07-14 PROCEDURE — 1101F PR PT FALLS ASSESS DOC 0-1 FALLS W/OUT INJ PAST YR: ICD-10-PCS | Mod: CPTII,,, | Performed by: FAMILY MEDICINE

## 2022-07-14 PROCEDURE — 1125F PR PAIN SEVERITY QUANTIFIED, PAIN PRESENT: ICD-10-PCS | Mod: CPTII,,, | Performed by: FAMILY MEDICINE

## 2022-07-14 PROCEDURE — 1125F AMNT PAIN NOTED PAIN PRSNT: CPT | Mod: CPTII,,, | Performed by: FAMILY MEDICINE

## 2022-07-14 PROCEDURE — 3078F DIAST BP <80 MM HG: CPT | Mod: CPTII,,, | Performed by: FAMILY MEDICINE

## 2022-07-14 RX ORDER — KETOROLAC TROMETHAMINE 30 MG/ML
30 INJECTION, SOLUTION INTRAMUSCULAR; INTRAVENOUS
Status: SHIPPED | OUTPATIENT
Start: 2022-07-14 | End: 2022-07-17

## 2022-07-14 RX ORDER — MELOXICAM 15 MG/1
15 TABLET ORAL DAILY
Qty: 30 TABLET | Refills: 1 | Status: SHIPPED | OUTPATIENT
Start: 2022-07-14 | End: 2022-09-12

## 2022-07-14 RX ORDER — GABAPENTIN 600 MG/1
600 TABLET ORAL
COMMUNITY
End: 2023-06-29

## 2022-07-14 RX ORDER — DICLOFENAC SODIUM 10 MG/G
2 GEL TOPICAL 2 TIMES DAILY PRN
COMMUNITY
Start: 2021-06-01 | End: 2023-06-29

## 2022-07-14 NOTE — PROGRESS NOTES
"Subjective:       Patient ID: Starr Kidd is a 66 y.o. female.    Chief Complaint: Severe back pain, causing HA, nausea and hypertension      Left hip pain      Review of Systems   Constitutional: Negative.    Respiratory: Negative.    Cardiovascular: Negative.    Musculoskeletal:        Left hip pain: seen in ER on 7/10/2022, no recent trauma, no falls at home, reports elevated BP due to pain, history of prior injection   Psychiatric/Behavioral: Negative.            Objective:      BP (!) 142/70 (BP Location: Left arm, Patient Position: Sitting, BP Method: Large (Manual))   Pulse 85   Temp 97.1 °F (36.2 °C)   Resp 18   Ht 5' 4" (1.626 m)   Wt 81.6 kg (180 lb)   SpO2 100%   BMI 30.90 kg/m²    Physical Exam  Constitutional:       General: She is not in acute distress.     Appearance: Normal appearance.   Cardiovascular:      Rate and Rhythm: Normal rate and regular rhythm.   Pulmonary:      Effort: Pulmonary effort is normal.      Breath sounds: Normal breath sounds.   Musculoskeletal:      Comments: Left hip:  TTP, no edema   Neurological:      Mental Status: She is alert.   Psychiatric:         Mood and Affect: Mood normal.         Behavior: Behavior normal.         Thought Content: Thought content normal.         Judgment: Judgment normal.             Assessment:       Problem List Items Addressed This Visit    None     Visit Diagnoses     Left hip pain    -  Primary    Relevant Medications    meloxicam (MOBIC) 15 MG tablet           Plan:     1. Left hip pain  Left hip x-ray results reviewed patient  Increase Mobic to 15 mg q.day  Toradol 30 mg IM x1 now  Monitor  Return to clinic with any concerns  "

## 2022-07-18 ENCOUNTER — TELEPHONE (OUTPATIENT)
Dept: FAMILY MEDICINE | Facility: CLINIC | Age: 67
End: 2022-07-18

## 2022-07-19 DIAGNOSIS — R52 PAIN: Primary | ICD-10-CM

## 2022-08-11 ENCOUNTER — ANESTHESIA EVENT (OUTPATIENT)
Dept: SURGERY | Facility: HOSPITAL | Age: 67
End: 2022-08-11
Payer: MEDICARE

## 2022-08-11 NOTE — ANESTHESIA PREPROCEDURE EVALUATION
08/11/2022  Starr Kidd is a 66 y.o., female.      Pre-op Assessment    I have reviewed the Patient Summary Reports.    I have reviewed the NPO Status.   I have reviewed the Medications.     Review of Systems  Anesthesia Hx:  No problems with previous Anesthesia  Neg history of prior surgery. Denies Family Hx of Anesthesia complications.   Denies Personal Hx of Anesthesia complications.   Social:  Non-Smoker    Hematology/Oncology:     Oncology Normal   Hematology Comments: PVD DVT     EENT/Dental:EENT/Dental Normal   Cardiovascular:   CAD   PVD hyperlipidemia    Pulmonary:  Pulmonary Normal    Renal/:  Renal/ Normal     Hepatic/GI:   GERD    Musculoskeletal:   Arthritis     Neurological:   Headaches    Endocrine:   Diabetes, type 2, using insulin    Psych:  Psychiatric Normal           Physical Exam  General: Cooperative, Alert and Oriented    Airway:  Mallampati: I   Mouth Opening: Normal  TM Distance: Normal  Neck ROM: Normal ROM    Dental:  Intact        Anesthesia Plan  Type of Anesthesia, risks & benefits discussed:    Anesthesia Type: Gen Natural Airway  Intra-op Monitoring Plan: Standard ASA Monitors  Post Op Pain Control Plan: multimodal analgesia  Induction:  IV  Informed Consent: Informed consent signed with the Patient and all parties understand the risks and agree with anesthesia plan.  All questions answered. Patient consented to blood products? No  ASA Score: 3  Day of Surgery Review of History & Physical: I have interviewed and examined the patient. I have reviewed the patient's H&P dated: There are no significant changes.     Ready For Surgery From Anesthesia Perspective.     .

## 2022-08-12 ENCOUNTER — HOSPITAL ENCOUNTER (OUTPATIENT)
Facility: HOSPITAL | Age: 67
Discharge: HOME | End: 2022-08-12
Attending: ANESTHESIOLOGY | Admitting: ANESTHESIOLOGY
Payer: MEDICARE

## 2022-08-12 ENCOUNTER — ANESTHESIA (OUTPATIENT)
Dept: SURGERY | Facility: HOSPITAL | Age: 67
End: 2022-08-12
Payer: MEDICARE

## 2022-08-12 VITALS
HEART RATE: 79 BPM | WEIGHT: 179.88 LBS | BODY MASS INDEX: 30.88 KG/M2 | SYSTOLIC BLOOD PRESSURE: 101 MMHG | DIASTOLIC BLOOD PRESSURE: 51 MMHG | OXYGEN SATURATION: 94 % | TEMPERATURE: 99 F

## 2022-08-12 DIAGNOSIS — M46.1 SACROILIITIS: ICD-10-CM

## 2022-08-12 PROCEDURE — 25500020 PHARM REV CODE 255: Performed by: ANESTHESIOLOGY

## 2022-08-12 PROCEDURE — 27096 INJECT SACROILIAC JOINT: CPT | Performed by: ANESTHESIOLOGY

## 2022-08-12 PROCEDURE — 25000003 PHARM REV CODE 250

## 2022-08-12 PROCEDURE — 37000008 HC ANESTHESIA 1ST 15 MINUTES: Performed by: ANESTHESIOLOGY

## 2022-08-12 PROCEDURE — 63600175 PHARM REV CODE 636 W HCPCS: Performed by: ANESTHESIOLOGY

## 2022-08-12 PROCEDURE — 63600175 PHARM REV CODE 636 W HCPCS: Performed by: NURSE ANESTHETIST, CERTIFIED REGISTERED

## 2022-08-12 RX ORDER — SODIUM CHLORIDE, SODIUM LACTATE, POTASSIUM CHLORIDE, CALCIUM CHLORIDE 600; 310; 30; 20 MG/100ML; MG/100ML; MG/100ML; MG/100ML
INJECTION, SOLUTION INTRAVENOUS CONTINUOUS
Status: DISCONTINUED | OUTPATIENT
Start: 2022-08-12 | End: 2022-08-12 | Stop reason: HOSPADM

## 2022-08-12 RX ORDER — METHYLPREDNISOLONE ACETATE 80 MG/ML
INJECTION, SUSPENSION INTRA-ARTICULAR; INTRALESIONAL; INTRAMUSCULAR; SOFT TISSUE
Status: DISCONTINUED | OUTPATIENT
Start: 2022-08-12 | End: 2022-08-12 | Stop reason: HOSPADM

## 2022-08-12 RX ORDER — IOPAMIDOL 612 MG/ML
INJECTION, SOLUTION INTRATHECAL
Status: DISCONTINUED | OUTPATIENT
Start: 2022-08-12 | End: 2022-08-12 | Stop reason: HOSPADM

## 2022-08-12 RX ORDER — LIDOCAINE HYDROCHLORIDE 10 MG/ML
1 INJECTION, SOLUTION EPIDURAL; INFILTRATION; INTRACAUDAL; PERINEURAL ONCE
Status: DISCONTINUED | OUTPATIENT
Start: 2022-08-12 | End: 2022-08-12 | Stop reason: HOSPADM

## 2022-08-12 RX ORDER — LIDOCAINE HYDROCHLORIDE 20 MG/ML
INJECTION, SOLUTION EPIDURAL; INFILTRATION; INTRACAUDAL; PERINEURAL
Status: DISCONTINUED | OUTPATIENT
Start: 2022-08-12 | End: 2022-08-12 | Stop reason: HOSPADM

## 2022-08-12 RX ORDER — MIDAZOLAM HYDROCHLORIDE 1 MG/ML
INJECTION INTRAMUSCULAR; INTRAVENOUS
Status: DISCONTINUED | OUTPATIENT
Start: 2022-08-12 | End: 2022-08-12

## 2022-08-12 RX ADMIN — SODIUM CHLORIDE, POTASSIUM CHLORIDE, SODIUM LACTATE AND CALCIUM CHLORIDE: 600; 310; 30; 20 INJECTION, SOLUTION INTRAVENOUS at 09:08

## 2022-08-12 RX ADMIN — MIDAZOLAM HYDROCHLORIDE 4 MG: 1 INJECTION, SOLUTION INTRAMUSCULAR; INTRAVENOUS at 11:08

## 2022-08-12 NOTE — ANESTHESIA POSTPROCEDURE EVALUATION
Anesthesia Post Evaluation    Patient: Starr Kidd    Procedure(s) Performed: Procedure(s) (LRB):  INJECTION,SACROILIAC JOINT / Left SI joint injection (Left)    Final Anesthesia Type: MAC      Patient location during evaluation: OPS  Patient participation: Yes- Able to Participate  Level of consciousness: awake and alert  Post-procedure vital signs: reviewed and stable  Pain management: adequate  Airway patency: patent  ENMA mitigation strategies: Multimodal analgesia  PONV status at discharge: No PONV  Anesthetic complications: no      Cardiovascular status: hemodynamically stable  Respiratory status: unassisted, spontaneous ventilation and room air  Hydration status: euvolemic  Follow-up not needed.  Comments: Patient to bed per self          Vitals Value Taken Time   /92 08/12/22 0932   Temp 36.9 °C (98.5 °F) 08/12/22 0932   Pulse 72 08/12/22 0932   Resp 17 08/12/22 1200   SpO2 97 % 08/12/22 0932         No case tracking events are documented in the log.      Pain/Donta Score: No data recorded

## 2022-08-12 NOTE — DISCHARGE SUMMARY
[facility] - Periop Services  Discharge Note  Short Stay    Procedure(s) (LRB):  INJECTION,SACROILIAC JOINT / Left SI joint injection (Left)    OUTCOME: Patient tolerated treatment/procedure well without complication and is now ready for discharge.    DISPOSITION: Home or Self Care    FINAL DIAGNOSIS:  Sacroiliitis    FOLLOWUP: In clinic    DISCHARGE INSTRUCTIONS:  No discharge procedures on file.      Clinical Reference Documents Added to Patient Instructions       Document    SACROILIAC JOINT PAIN DISCHARGE INSTRUCTIONS (ENGLISH)          TIME SPENT ON DISCHARGE: 3 minutes

## 2022-08-12 NOTE — INTERVAL H&P NOTE
The patient has been examined and the H&P has been reviewed:    I concur with the findings and no changes have occurred since H&P was written.    Procedure risks, benefits and alternative options discussed and understood by patient/family.          Active Hospital Problems    Diagnosis  POA    *Sacroiliitis [M46.1]  Yes     Chronic      Resolved Hospital Problems   No resolved problems to display.

## 2022-08-16 ENCOUNTER — NURSE TRIAGE (OUTPATIENT)
Dept: ADMINISTRATIVE | Facility: CLINIC | Age: 67
End: 2022-08-16
Payer: MEDICARE

## 2022-08-17 ENCOUNTER — HOSPITAL ENCOUNTER (EMERGENCY)
Facility: HOSPITAL | Age: 67
Discharge: HOME | End: 2022-08-17
Attending: EMERGENCY MEDICINE
Payer: MEDICARE

## 2022-08-17 VITALS
HEIGHT: 64 IN | BODY MASS INDEX: 30.73 KG/M2 | TEMPERATURE: 99 F | HEART RATE: 91 BPM | SYSTOLIC BLOOD PRESSURE: 166 MMHG | DIASTOLIC BLOOD PRESSURE: 91 MMHG | OXYGEN SATURATION: 96 % | WEIGHT: 180 LBS | RESPIRATION RATE: 18 BRPM

## 2022-08-17 DIAGNOSIS — R22.0 MILD TONGUE SWELLING: Primary | ICD-10-CM

## 2022-08-17 DIAGNOSIS — R73.9 ACUTE HYPERGLYCEMIA: ICD-10-CM

## 2022-08-17 LAB
ALBUMIN SERPL-MCNC: 4 GM/DL (ref 3.4–4.8)
ALBUMIN/GLOB SERPL: 1 RATIO (ref 1.1–2)
ALP SERPL-CCNC: 78 UNIT/L (ref 40–150)
ALT SERPL-CCNC: 21 UNIT/L (ref 0–55)
AST SERPL-CCNC: 18 UNIT/L (ref 5–34)
BASOPHILS # BLD AUTO: 0.04 X10(3)/MCL (ref 0–0.2)
BASOPHILS NFR BLD AUTO: 0.4 %
BILIRUBIN DIRECT+TOT PNL SERPL-MCNC: 0.8 MG/DL
BUN SERPL-MCNC: 21 MG/DL (ref 9.8–20.1)
CALCIUM SERPL-MCNC: 10.4 MG/DL (ref 8.4–10.2)
CHLORIDE SERPL-SCNC: 101 MMOL/L (ref 98–107)
CO2 SERPL-SCNC: 27 MMOL/L (ref 23–31)
CREAT SERPL-MCNC: 0.97 MG/DL (ref 0.55–1.02)
EOSINOPHIL # BLD AUTO: 0.04 X10(3)/MCL (ref 0–0.9)
EOSINOPHIL NFR BLD AUTO: 0.4 %
ERYTHROCYTE [DISTWIDTH] IN BLOOD BY AUTOMATED COUNT: 13.1 % (ref 11.5–17)
GFR SERPLBLD CREATININE-BSD FMLA CKD-EPI: >60 MLS/MIN/1.73/M2
GLOBULIN SER-MCNC: 3.9 GM/DL (ref 2.4–3.5)
GLUCOSE SERPL-MCNC: 251 MG/DL (ref 82–115)
HCT VFR BLD AUTO: 43.2 % (ref 37–47)
HGB BLD-MCNC: 14.6 GM/DL (ref 12–16)
IMM GRANULOCYTES # BLD AUTO: 0.04 X10(3)/MCL (ref 0–0.04)
IMM GRANULOCYTES NFR BLD AUTO: 0.4 %
LYMPHOCYTES # BLD AUTO: 2.59 X10(3)/MCL (ref 0.6–4.6)
LYMPHOCYTES NFR BLD AUTO: 23.8 %
MCH RBC QN AUTO: 31.1 PG (ref 27–31)
MCHC RBC AUTO-ENTMCNC: 33.8 MG/DL (ref 33–36)
MCV RBC AUTO: 92.1 FL (ref 80–94)
MONOCYTES # BLD AUTO: 0.72 X10(3)/MCL (ref 0.1–1.3)
MONOCYTES NFR BLD AUTO: 6.6 %
NEUTROPHILS # BLD AUTO: 7.5 X10(3)/MCL (ref 2.1–9.2)
NEUTROPHILS NFR BLD AUTO: 68.4 %
NRBC BLD AUTO-RTO: 0 %
PLATELET # BLD AUTO: 321 X10(3)/MCL (ref 130–400)
PMV BLD AUTO: 8.9 FL (ref 7.4–10.4)
POTASSIUM SERPL-SCNC: 4.2 MMOL/L (ref 3.5–5.1)
PROT SERPL-MCNC: 7.9 GM/DL (ref 5.8–7.6)
RBC # BLD AUTO: 4.69 X10(6)/MCL (ref 4.2–5.4)
SODIUM SERPL-SCNC: 140 MMOL/L (ref 136–145)
WBC # SPEC AUTO: 10.9 X10(3)/MCL (ref 4.5–11.5)

## 2022-08-17 PROCEDURE — 80053 COMPREHEN METABOLIC PANEL: CPT | Performed by: EMERGENCY MEDICINE

## 2022-08-17 PROCEDURE — 85025 COMPLETE CBC W/AUTO DIFF WBC: CPT | Performed by: EMERGENCY MEDICINE

## 2022-08-17 PROCEDURE — 36415 COLL VENOUS BLD VENIPUNCTURE: CPT | Performed by: EMERGENCY MEDICINE

## 2022-08-17 PROCEDURE — 99284 EMERGENCY DEPT VISIT MOD MDM: CPT | Mod: 25

## 2022-08-17 PROCEDURE — 63600175 PHARM REV CODE 636 W HCPCS: Performed by: EMERGENCY MEDICINE

## 2022-08-17 PROCEDURE — 96372 THER/PROPH/DIAG INJ SC/IM: CPT | Performed by: EMERGENCY MEDICINE

## 2022-08-17 RX ORDER — DIPHENHYDRAMINE HYDROCHLORIDE 50 MG/ML
25 INJECTION INTRAMUSCULAR; INTRAVENOUS
Status: COMPLETED | OUTPATIENT
Start: 2022-08-17 | End: 2022-08-17

## 2022-08-17 RX ORDER — DIPHENHYDRAMINE HCL 25 MG
25 CAPSULE ORAL
Status: DISCONTINUED | OUTPATIENT
Start: 2022-08-17 | End: 2022-08-17

## 2022-08-17 RX ADMIN — DIPHENHYDRAMINE HYDROCHLORIDE 25 MG: 50 INJECTION, SOLUTION INTRAMUSCULAR; INTRAVENOUS at 11:08

## 2022-08-17 NOTE — TELEPHONE ENCOUNTER
Had an epidural done on Friday. Went through the weekend and called the MD and was given Prednisone. Takes 4 medications and thinks the prednisone is  Interacting with some of her medications. Having reactions had chest swelling and tongue swelling around 830 pm. Started prednisone on yesterday morning.    Reason for Disposition   [1] Widespread hives, itching or facial swelling AND [2] onset < 2 hours of exposure to high-risk allergen (e.g., sting, nuts, 1st dose of antibiotic)    Additional Information   Negative: [1] Life-threatening reaction in the past to similar substance (e.g., food, insect bite/sting, medication, etc.) AND [2] < 2 hours since exposure   Negative: Wheezing, stridor, hoarseness, or difficulty breathing   Negative: [1] Tightness in the chest or throat AND [2] begins within 2 hours of exposure to allergic substance   Negative: Difficulty swallowing, drooling or slurred speech   Negative: Difficult to awaken or acting confused (e.g., disoriented, slurred speech)   Negative: Unresponsive, passed out or very weak   Negative: Other symptom of severe allergic reaction (Exception: Hives or facial swelling alone)   Negative: Sounds like a life-threatening emergency to the triager   Negative: [1] Widespread hives AND [2] onset > 2 hours after exposure to high-risk allergen (e.g., sting, nuts, 1st dose of antibiotic)   Negative: [1] Widespread itching AND [2] onset > 2 hours after exposure to high-risk allergen (e.g., sting, nuts, 1st dose of antibiotic)   Negative: [1] Face swelling AND [2] onset > 2 hours after exposure to high-risk allergen (e.g., sting, nuts, 1st dose of antibiotic)    Protocols used: [id number]

## 2022-08-17 NOTE — ED PROVIDER NOTES
Encounter Date: 8/17/2022       History     Chief Complaint   Patient presents with    Allergic Reaction     Received a SI joint injection, was allergic to the dye, states the dr used a little of the dye during the procedure.  States she was ok until she took her regular medicines with the newly prescribed methylprednisolone and had tounge swelling, pounding headache and jitteriness.     Patient is a 66-year-old female presenting with complain of an episode tongue swelling, itching and feeling jittery starting last night.  Patient states that the symptoms are better today.  Patient states that her tongue still feels a little swollen.  Patient denies any shortness of breath.  No chest pain.  Patient also states she had headache last night which is now much improved.  Patient is not on Ace inhibitor or an ARB.  Patient denies any rash.  Patient is currently on a Medrol Dosepak.        Review of patient's allergies indicates:   Allergen Reactions    Bethanechol Shortness Of Breath    Codeine Shortness Of Breath     Other reaction(s): ANAPHYLAXIS    Gabapentin Swelling    Iodine and iodide containing products Anaphylaxis and Swelling     Other reaction(s): Respiratory Distress    Meperidine Shortness Of Breath     Other reaction(s): Respiratory Distress    Penicillins Anaphylaxis    Phenergan [promethazine] Swelling    Pork derived (porcine) Anaphylaxis and Nausea And Vomiting    Sulfa (sulfonamide antibiotics)      Other reaction(s): anaphylactic  Other reaction(s): Respiratory Distress    Ciprofloxacin      Other reaction(s): UNABLL TO EXPLAIN SIDE EFFECTS    Cymbalta [duloxetine]      Unknown      Dexamethasone Itching     Other reaction(s): Respiratory Distress    Ivp dye [iodinated contrast media]      Other reaction(s): anaphylaxis    Latex, natural rubber Itching    Propoxyphene n-acetaminophen      Other reaction(s): SOB    Saccharomyces cerevisiae      Other reaction(s): RASH    Egg derived  Rash    Morphine Nausea And Vomiting     Past Medical History:   Diagnosis Date    Diabetes mellitus     GERD (gastroesophageal reflux disease)     High cholesterol     History of blood clots     Migraine      Past Surgical History:   Procedure Laterality Date    APPENDECTOMY      BACK SURGERY      CAROTID ENDARTERECTOMY      CARPAL TUNNEL RELEASE      CHOLECYSTECTOMY      EPIDURAL STEROID INJECTION      HYSTERECTOMY      INJECTION, SACROILIAC JOINT Left 8/12/2022    Procedure: INJECTION,SACROILIAC JOINT / Left SI joint injection;  Surgeon: Cristiano Farley MD;  Location: [facility];  Service: Pain Management;  Laterality: Left;    LEG AMPUTATION THROUGH KNEE      NECK SURGERY      SINUS SURGERY      TUBAL LIGATION       Family History   Problem Relation Age of Onset    Heart disease Mother     No Known Problems Father      Social History     Tobacco Use    Smoking status: Never Smoker    Smokeless tobacco: Never Used   Substance Use Topics    Alcohol use: Yes    Drug use: Never     Review of Systems   Constitutional: Negative.    HENT: Negative for dental problem, drooling, facial swelling, mouth sores, sore throat, trouble swallowing and voice change.         Tongue swelling   Respiratory: Negative.    Cardiovascular: Negative.    Gastrointestinal: Negative.    Musculoskeletal: Negative.    Skin: Negative.    Neurological: Negative.        Physical Exam     Initial Vitals [08/17/22 1029]   BP Pulse Resp Temp SpO2   (!) 166/109 95 18 98.8 °F (37.1 °C) 97 %      MAP       --         Physical Exam    Nursing note and vitals reviewed.  Constitutional: She appears well-developed and well-nourished.   HENT:   Head: Normocephalic and atraumatic.   Patient does have some generalized mild tongue swelling.  No dysphonia.  No stridor.   Neck: Neck supple.   Normal range of motion.  Cardiovascular: Normal rate and normal heart sounds.   Pulmonary/Chest: Breath sounds normal. No respiratory distress. She has  no wheezes. She has no rhonchi. She has no rales.   Abdominal: Abdomen is soft. Bowel sounds are normal.   Musculoskeletal:         General: Normal range of motion.      Cervical back: Normal range of motion and neck supple.     Neurological: She is alert and oriented to person, place, and time. She has normal strength.   Skin: Skin is warm and dry.   Psychiatric: She has a normal mood and affect. Her behavior is normal. Thought content normal.         ED Course   Procedures  Labs Reviewed   COMPREHENSIVE METABOLIC PANEL - Abnormal; Notable for the following components:       Result Value    Glucose Level 251 (*)     Blood Urea Nitrogen 21.0 (*)     Calcium Level Total 10.4 (*)     Protein Total 7.9 (*)     Globulin 3.9 (*)     Albumin/Globulin Ratio 1.0 (*)     All other components within normal limits   CBC WITH DIFFERENTIAL - Abnormal; Notable for the following components:    MCH 31.1 (*)     All other components within normal limits   CBC W/ AUTO DIFFERENTIAL    Narrative:     The following orders were created for panel order CBC auto differential.  Procedure                               Abnormality         Status                     ---------                               -----------         ------                     CBC with Differential[[id number]]        Abnormal            Final result                 Please view results for these tests on the individual orders.          Imaging Results    None          Medications   diphenhydrAMINE injection 25 mg (25 mg Intramuscular Given 8/17/22 1154)                 ED Course as of 08/17/22 1208   Wed Aug 17, 2022   1204 Pt in NAD.  No worsening of tongue swelling. [KG]      ED Course User Index  [KG] Jh Gunderson MD             Clinical Impression:   Final diagnoses:  [R22.0] Mild tongue swelling (Primary)  [R73.9] Acute hyperglycemia          ED Disposition Condition    Discharge Stable        ED Prescriptions     None        Follow-up Information     Follow  up With Specialties Details Why Contact Info    Obey Baker MD Family Medicine In 2 days  [address] [facility] [address]  [phone]      [facility] - Emergency Dept Emergency Medicine  If symptoms worsen [address] [facility] [id number] [phone]           Jh Gunderson MD  08/17/22 1917

## 2022-09-03 ENCOUNTER — DOCUMENTATION ONLY (OUTPATIENT)
Dept: FAMILY MEDICINE | Facility: CLINIC | Age: 67
End: 2022-09-03
Payer: MEDICARE

## 2022-09-13 LAB
LEFT EYE DM RETINOPATHY: NEGATIVE
RIGHT EYE DM RETINOPATHY: NEGATIVE

## 2022-09-21 ENCOUNTER — HISTORICAL (OUTPATIENT)
Dept: ADMINISTRATIVE | Facility: HOSPITAL | Age: 67
End: 2022-09-21
Payer: MEDICARE

## 2022-11-02 DIAGNOSIS — E11.69 TYPE 2 DIABETES MELLITUS WITH OTHER SPECIFIED COMPLICATION, UNSPECIFIED WHETHER LONG TERM INSULIN USE: Primary | ICD-10-CM

## 2022-11-07 DIAGNOSIS — Z13.6 SCREENING FOR ISCHEMIC HEART DISEASE: ICD-10-CM

## 2022-11-07 DIAGNOSIS — Z00.00 WELLNESS EXAMINATION: Primary | ICD-10-CM

## 2022-11-07 DIAGNOSIS — E11.69 TYPE 2 DIABETES MELLITUS WITH OTHER SPECIFIED COMPLICATION, UNSPECIFIED WHETHER LONG TERM INSULIN USE: ICD-10-CM

## 2022-11-07 DIAGNOSIS — Z11.4 ENCOUNTER FOR SCREENING FOR HIV: ICD-10-CM

## 2022-11-07 DIAGNOSIS — Z11.59 NEED FOR HEPATITIS C SCREENING TEST: ICD-10-CM

## 2022-11-22 ENCOUNTER — LAB VISIT (OUTPATIENT)
Dept: LAB | Facility: HOSPITAL | Age: 67
End: 2022-11-22
Attending: FAMILY MEDICINE
Payer: MEDICARE

## 2022-11-22 DIAGNOSIS — Z13.6 SCREENING FOR ISCHEMIC HEART DISEASE: ICD-10-CM

## 2022-11-22 DIAGNOSIS — E11.69 TYPE 2 DIABETES MELLITUS WITH OTHER SPECIFIED COMPLICATION, UNSPECIFIED WHETHER LONG TERM INSULIN USE: ICD-10-CM

## 2022-11-22 DIAGNOSIS — Z11.4 ENCOUNTER FOR SCREENING FOR HIV: ICD-10-CM

## 2022-11-22 DIAGNOSIS — Z11.59 NEED FOR HEPATITIS C SCREENING TEST: ICD-10-CM

## 2022-11-22 DIAGNOSIS — Z00.00 WELLNESS EXAMINATION: ICD-10-CM

## 2022-11-22 LAB
ALBUMIN SERPL-MCNC: 3.6 GM/DL (ref 3.4–4.8)
ALBUMIN/GLOB SERPL: 1 RATIO (ref 1.1–2)
ALP SERPL-CCNC: 98 UNIT/L (ref 40–150)
ALT SERPL-CCNC: 27 UNIT/L (ref 0–55)
AST SERPL-CCNC: 26 UNIT/L (ref 5–34)
BASOPHILS # BLD AUTO: 0.07 X10(3)/MCL (ref 0–0.2)
BASOPHILS NFR BLD AUTO: 1.2 %
BILIRUBIN DIRECT+TOT PNL SERPL-MCNC: 0.6 MG/DL
BUN SERPL-MCNC: 15 MG/DL (ref 9.8–20.1)
CALCIUM SERPL-MCNC: 9.9 MG/DL (ref 8.4–10.2)
CHLORIDE SERPL-SCNC: 106 MMOL/L (ref 98–107)
CHOLEST SERPL-MCNC: 193 MG/DL
CHOLEST/HDLC SERPL: 3 {RATIO} (ref 0–5)
CO2 SERPL-SCNC: 29 MMOL/L (ref 23–31)
CREAT SERPL-MCNC: 0.81 MG/DL (ref 0.55–1.02)
CREAT UR-MCNC: 118.5 MG/DL (ref 47–110)
EOSINOPHIL # BLD AUTO: 0.22 X10(3)/MCL (ref 0–0.9)
EOSINOPHIL NFR BLD AUTO: 3.8 %
ERYTHROCYTE [DISTWIDTH] IN BLOOD BY AUTOMATED COUNT: 12.4 % (ref 11.5–17)
EST. AVERAGE GLUCOSE BLD GHB EST-MCNC: 205.9 MG/DL
GFR SERPLBLD CREATININE-BSD FMLA CKD-EPI: >60 MLS/MIN/1.73/M2
GLOBULIN SER-MCNC: 3.7 GM/DL (ref 2.4–3.5)
GLUCOSE SERPL-MCNC: 96 MG/DL (ref 82–115)
HBA1C MFR BLD: 8.8 %
HCT VFR BLD AUTO: 45.2 % (ref 37–47)
HCV AB SERPL QL IA: NONREACTIVE
HDLC SERPL-MCNC: 65 MG/DL (ref 35–60)
HGB BLD-MCNC: 14.4 GM/DL (ref 12–16)
HIV 1+2 AB+HIV1 P24 AG SERPL QL IA: NONREACTIVE
IMM GRANULOCYTES # BLD AUTO: 0.01 X10(3)/MCL (ref 0–0.04)
IMM GRANULOCYTES NFR BLD AUTO: 0.2 %
LDLC SERPL CALC-MCNC: 104 MG/DL (ref 50–140)
LYMPHOCYTES # BLD AUTO: 2.38 X10(3)/MCL (ref 0.6–4.6)
LYMPHOCYTES NFR BLD AUTO: 41.5 %
MCH RBC QN AUTO: 31 PG (ref 27–31)
MCHC RBC AUTO-ENTMCNC: 31.9 MG/DL (ref 33–36)
MCV RBC AUTO: 97.2 FL (ref 80–94)
MICROALBUMIN UR-MCNC: 99.9 UG/ML
MICROALBUMIN/CREAT RATIO PNL UR: 84.3 MG/GM CR (ref 0–30)
MONOCYTES # BLD AUTO: 0.52 X10(3)/MCL (ref 0.1–1.3)
MONOCYTES NFR BLD AUTO: 9.1 %
NEUTROPHILS # BLD AUTO: 2.5 X10(3)/MCL (ref 2.1–9.2)
NEUTROPHILS NFR BLD AUTO: 44.2 %
NRBC BLD AUTO-RTO: 0 %
PLATELET # BLD AUTO: 286 X10(3)/MCL (ref 130–400)
PMV BLD AUTO: 8.9 FL (ref 7.4–10.4)
POTASSIUM SERPL-SCNC: 4.1 MMOL/L (ref 3.5–5.1)
PROT SERPL-MCNC: 7.3 GM/DL (ref 5.8–7.6)
RBC # BLD AUTO: 4.65 X10(6)/MCL (ref 4.2–5.4)
SODIUM SERPL-SCNC: 145 MMOL/L (ref 136–145)
TRIGL SERPL-MCNC: 121 MG/DL (ref 37–140)
VLDLC SERPL CALC-MCNC: 24 MG/DL
WBC # SPEC AUTO: 5.7 X10(3)/MCL (ref 4.5–11.5)

## 2022-11-22 PROCEDURE — 83036 HEMOGLOBIN GLYCOSYLATED A1C: CPT

## 2022-11-22 PROCEDURE — 85025 COMPLETE CBC W/AUTO DIFF WBC: CPT

## 2022-11-22 PROCEDURE — 36415 COLL VENOUS BLD VENIPUNCTURE: CPT

## 2022-11-22 PROCEDURE — 80053 COMPREHEN METABOLIC PANEL: CPT

## 2022-11-22 PROCEDURE — 86803 HEPATITIS C AB TEST: CPT

## 2022-11-22 PROCEDURE — 82043 UR ALBUMIN QUANTITATIVE: CPT

## 2022-11-22 PROCEDURE — 87389 HIV-1 AG W/HIV-1&-2 AB AG IA: CPT

## 2022-11-22 PROCEDURE — 80061 LIPID PANEL: CPT

## 2022-11-29 ENCOUNTER — OFFICE VISIT (OUTPATIENT)
Dept: FAMILY MEDICINE | Facility: CLINIC | Age: 67
End: 2022-11-29
Payer: MEDICARE

## 2022-11-29 VITALS
RESPIRATION RATE: 18 BRPM | TEMPERATURE: 97 F | HEIGHT: 64 IN | HEART RATE: 67 BPM | WEIGHT: 180 LBS | OXYGEN SATURATION: 97 % | SYSTOLIC BLOOD PRESSURE: 132 MMHG | DIASTOLIC BLOOD PRESSURE: 76 MMHG | BODY MASS INDEX: 30.73 KG/M2

## 2022-11-29 DIAGNOSIS — Z12.11 COLON CANCER SCREENING: ICD-10-CM

## 2022-11-29 DIAGNOSIS — Z89.611 S/P AKA (ABOVE KNEE AMPUTATION), RIGHT: ICD-10-CM

## 2022-11-29 DIAGNOSIS — Z00.00 WELLNESS EXAMINATION: Primary | ICD-10-CM

## 2022-11-29 DIAGNOSIS — M81.0 AGE-RELATED OSTEOPOROSIS WITHOUT CURRENT PATHOLOGICAL FRACTURE: ICD-10-CM

## 2022-11-29 DIAGNOSIS — Z79.4 TYPE 2 DIABETES MELLITUS WITHOUT COMPLICATION, WITH LONG-TERM CURRENT USE OF INSULIN: ICD-10-CM

## 2022-11-29 DIAGNOSIS — Z12.31 SCREENING MAMMOGRAM, ENCOUNTER FOR: ICD-10-CM

## 2022-11-29 DIAGNOSIS — E11.9 TYPE 2 DIABETES MELLITUS WITHOUT COMPLICATION, WITH LONG-TERM CURRENT USE OF INSULIN: ICD-10-CM

## 2022-11-29 PROCEDURE — G0439 PR MEDICARE ANNUAL WELLNESS SUBSEQUENT VISIT: ICD-10-PCS | Mod: ,,, | Performed by: FAMILY MEDICINE

## 2022-11-29 PROCEDURE — 1160F RVW MEDS BY RX/DR IN RCRD: CPT | Mod: CPTII,,, | Performed by: FAMILY MEDICINE

## 2022-11-29 PROCEDURE — 3060F PR POS MICROALBUMINURIA RESULT DOCUMENTED/REVIEW: ICD-10-PCS | Mod: CPTII,,, | Performed by: FAMILY MEDICINE

## 2022-11-29 PROCEDURE — 1159F PR MEDICATION LIST DOCUMENTED IN MEDICAL RECORD: ICD-10-PCS | Mod: CPTII,,, | Performed by: FAMILY MEDICINE

## 2022-11-29 PROCEDURE — 3075F SYST BP GE 130 - 139MM HG: CPT | Mod: CPTII,,, | Performed by: FAMILY MEDICINE

## 2022-11-29 PROCEDURE — G0439 PPPS, SUBSEQ VISIT: HCPCS | Mod: ,,, | Performed by: FAMILY MEDICINE

## 2022-11-29 PROCEDURE — 3288F FALL RISK ASSESSMENT DOCD: CPT | Mod: CPTII,,, | Performed by: FAMILY MEDICINE

## 2022-11-29 PROCEDURE — 1101F PR PT FALLS ASSESS DOC 0-1 FALLS W/OUT INJ PAST YR: ICD-10-PCS | Mod: CPTII,,, | Performed by: FAMILY MEDICINE

## 2022-11-29 PROCEDURE — 3075F PR MOST RECENT SYSTOLIC BLOOD PRESS GE 130-139MM HG: ICD-10-PCS | Mod: CPTII,,, | Performed by: FAMILY MEDICINE

## 2022-11-29 PROCEDURE — 3078F DIAST BP <80 MM HG: CPT | Mod: CPTII,,, | Performed by: FAMILY MEDICINE

## 2022-11-29 PROCEDURE — 1160F PR REVIEW ALL MEDS BY PRESCRIBER/CLIN PHARMACIST DOCUMENTED: ICD-10-PCS | Mod: CPTII,,, | Performed by: FAMILY MEDICINE

## 2022-11-29 PROCEDURE — 3066F NEPHROPATHY DOC TX: CPT | Mod: CPTII,,, | Performed by: FAMILY MEDICINE

## 2022-11-29 PROCEDURE — 3288F PR FALLS RISK ASSESSMENT DOCUMENTED: ICD-10-PCS | Mod: CPTII,,, | Performed by: FAMILY MEDICINE

## 2022-11-29 PROCEDURE — 3008F BODY MASS INDEX DOCD: CPT | Mod: CPTII,,, | Performed by: FAMILY MEDICINE

## 2022-11-29 PROCEDURE — 1159F MED LIST DOCD IN RCRD: CPT | Mod: CPTII,,, | Performed by: FAMILY MEDICINE

## 2022-11-29 PROCEDURE — 3078F PR MOST RECENT DIASTOLIC BLOOD PRESSURE < 80 MM HG: ICD-10-PCS | Mod: CPTII,,, | Performed by: FAMILY MEDICINE

## 2022-11-29 PROCEDURE — 3060F POS MICROALBUMINURIA REV: CPT | Mod: CPTII,,, | Performed by: FAMILY MEDICINE

## 2022-11-29 PROCEDURE — 1101F PT FALLS ASSESS-DOCD LE1/YR: CPT | Mod: CPTII,,, | Performed by: FAMILY MEDICINE

## 2022-11-29 PROCEDURE — 3008F PR BODY MASS INDEX (BMI) DOCUMENTED: ICD-10-PCS | Mod: CPTII,,, | Performed by: FAMILY MEDICINE

## 2022-11-29 PROCEDURE — 3066F PR DOCUMENTATION OF TREATMENT FOR NEPHROPATHY: ICD-10-PCS | Mod: CPTII,,, | Performed by: FAMILY MEDICINE

## 2022-11-29 RX ORDER — AMITRIPTYLINE HYDROCHLORIDE 25 MG/1
12.5 TABLET, FILM COATED ORAL NIGHTLY
COMMUNITY
Start: 2022-07-28 | End: 2023-06-29

## 2022-11-29 RX ORDER — DICLOFENAC SODIUM 10 MG/G
2 GEL TOPICAL 4 TIMES DAILY
Qty: 100 G | Refills: 1 | Status: SHIPPED | OUTPATIENT
Start: 2022-11-29 | End: 2023-06-29

## 2022-11-29 NOTE — PROGRESS NOTES
Patient ID: [id number]     Chief Complaint: wellness      HPI:     Starr Kidd is a 67 y.o. female here today for a Medicare Wellness.       Opioid Screening: Patient medication list reviewed, patient is not taking prescription opioids. Patient is not using additional opioids than prescribed. Patient is not at low risk of substance abuse based on this opioid use history.       ----------------------------  Diabetes mellitus  GERD (gastroesophageal reflux disease)  High cholesterol  History of blood clots  Migraine     Past Surgical History:   Procedure Laterality Date    APPENDECTOMY      BACK SURGERY      CAROTID ENDARTERECTOMY      CARPAL TUNNEL RELEASE      CHOLECYSTECTOMY      EPIDURAL STEROID INJECTION      HYSTERECTOMY      INJECTION, SACROILIAC JOINT Left 8/12/2022    Procedure: INJECTION,SACROILIAC JOINT / Left SI joint injection;  Surgeon: Cristiano Farley MD;  Location: [facility];  Service: Pain Management;  Laterality: Left;    LEG AMPUTATION THROUGH KNEE      NECK SURGERY      SINUS SURGERY      TUBAL LIGATION         Review of patient's allergies indicates:   Allergen Reactions    Bethanechol Shortness Of Breath    Codeine Shortness Of Breath     Other reaction(s): ANAPHYLAXIS    Gabapentin Swelling    Iodine and iodide containing products Anaphylaxis and Swelling     Other reaction(s): Respiratory Distress    Meperidine Shortness Of Breath     Other reaction(s): Respiratory Distress    Penicillins Anaphylaxis    Phenergan [promethazine] Swelling    Pork derived (porcine) Anaphylaxis and Nausea And Vomiting    Sulfa (sulfonamide antibiotics)      Other reaction(s): anaphylactic  Other reaction(s): Respiratory Distress    Ciprofloxacin      Other reaction(s): UNABLL TO EXPLAIN SIDE EFFECTS    Cymbalta [duloxetine]      Unknown      Dexamethasone Itching     Other reaction(s): Respiratory Distress    Ivp dye [iodinated contrast media]      Other reaction(s): anaphylaxis    Latex, natural rubber Itching  "   Propoxyphene n-acetaminophen      Other reaction(s): SOB    Saccharomyces cerevisiae      Other reaction(s): RASH    Egg derived Rash    Morphine Nausea And Vomiting       Outpatient Medications Marked as Taking for the 11/29/22 encounter (Office Visit) with Obey Baker MD   Medication Sig Dispense Refill    ACCU-CHEK DARLYN PLUS TEST STRP Strp CHECK CAPILLARY BLOOD GLUCOSE EVERY  strip 3    ACCU-CHEK GUIDE GLUCOSE METER Misc       ACCU-CHEK GUIDE L1-L2 CTRL SOL Soln       acetaminophen (TYLENOL) 500 MG tablet Tylenol Extra Strength 500 mg tablet   Take 1 tablet every day by oral route in the morning.      BD ALCOHOL SWABS PadM Apply topically.      cetirizine (ZYRTEC) 10 MG tablet cetirizine 10 mg tablet   Take 1 tablet every day by oral route.      CHOLESTYRAMINE LIGHT 4 gram packet Take 1 packet by mouth every morning.      diclofenac sodium (VOLTAREN) 1 % Gel Apply 2 g topically 2 (two) times daily as needed.      DROPLET INSULIN SYR,HALF UNIT, 0.5 mL 30 gauge x 1/2" Syrg       esomeprazole (NEXIUM) 40 MG capsule TAKE 1 CAPSULE EVERY DAY (Patient taking differently: Take 40 mg by mouth every evening.) 30 capsule 3    FARXIGA 10 mg tablet TAKE ONE TABLET BY MOUTH DAILY 30 tablet 4    gabapentin (NEURONTIN) 600 MG tablet Take 600 mg by mouth.      insulin glargine (LANTUS U-100 INSULIN) 100 unit/mL injection Inject 30 Units into the skin once daily. (Patient taking differently: Inject 30 Units into the skin every evening.) 30 mL 3    insulin lispro protamin-lispro 100 unit/mL (75-25) InPn Humalog Mix 75-25 KwikPen U-100 insulin 100 unit/mL subcutaneous pen      insulin NPH-insulin regular, 70/30, (NOVOLIN 70/30) 100 unit/mL (70-30) injection Inject 10 Units into the skin.      levocetirizine (XYZAL) 5 MG tablet Take 5 mg by mouth every evening.      multivit,stress formula-zinc Tab Take 1 tablet by mouth every morning.      NON FORMULARY MEDICATION Take by mouth.      NOVOFINE 32 32 gauge x 1/4" " Ndle Inject 1 Syringe into the skin 2 (two) times daily.      rosuvastatin (CRESTOR) 5 MG tablet Take 5 mg by mouth every evening.         Social History     Socioeconomic History    Marital status: Significant Other   Tobacco Use    Smoking status: Never    Smokeless tobacco: Never   Substance and Sexual Activity    Alcohol use: Yes    Drug use: Never    Sexual activity: Yes     Partners: Male        Family History   Problem Relation Age of Onset    Heart disease Mother     No Known Problems Father         Patient Care Team:  Obey Baker MD as PCP - General (Family Medicine)       Subjective:     Review of Systems   Constitutional: Negative.    HENT: Negative.     Respiratory: Negative.     Cardiovascular: Negative.    Gastrointestinal: Negative.    Genitourinary: Negative.    Musculoskeletal: Negative.    Neurological: Negative.    Endo/Heme/Allergies: Negative.    Psychiatric/Behavioral: Negative.         Patient Reported Health Risk Assessment  What [age] 65-69  Are you male or female?: Female  During the past four weeks, how much have you been bothered by emotional problems such as feeling anxious, depressed, irritable, sad, or downhearted and blue?: Not at all  During the past five weeks, has your physical and/or emotional health limited your social activities with family, friends, neighbors, or groups?: Not at all  During the past four weeks, how much bodily pain have you generally had?: Moderate pain  During the past four weeks, was someone available to help if you needed and wanted help?: No, not at all  During the past four weeks, what was the hardest physical activity you could do for at least two minutes?: Light  Can you get to places out of walking distance without help?  (For example, can you travel alone on buses or taxis, or drive your own car?): Yes  Can you go shopping for groceries or clothes without someone's help?: Yes  Can you prepare your own meals?: Yes  Can you do your own  housework without help?: Yes  Because of any health problems, do you need the help of another person with your personal care needs such as eating, bathing, dressing, or getting around the house?: No  Can you handle your own money without help?: Yes  During the past four weeks, how would you rate your health in general?: Good  How have things been going for you during the past four weeks?: Good and bad parts about equal  Are you having difficulties driving your car?: No  Do you always fasten your seat belt when you are in a car?: Yes, sometimes  How often in the past four weeks have you been bothered by falling or dizzy when standing up?: Never  How often in the past four weeks have you been bothered by sexual problems?: Never  How often in the past four weeks have you been bothered by trouble eating well?: Never  How often in the past four weeks have you been bothered by teeth or denture problems?: Never  How often in the past four weeks have you been bothered with problems using the telephone?: Never  How often in the past four weeks have you been bothered by tiredness or fatigue?: Never  Have you fallen two or more times in the past year?: No  Are you afraid of falling?: No  Are you a smoker?: No  During the past four weeks, how many drinks of wine, beer, or other alcoholic beverages did you have?: No alcohol at all  Do you exercise for about 20 minutes three or more days a week?: Yes, most of the time  Have you been given any information to help you with hazards in your house that might hurt you?: Yes  Have you been given any information to help you with keeping track of your medications?: Yes  How often do you have trouble taking medicines the way you've been told to take them?: I always take them as prescribed  How confident are you that you can control and manage most of your health problems?: Very confident  What is your race? (Check all that apply.):     Objective:     /76 (BP Location: Left  "arm, Patient Position: Sitting, BP Method: Large (Manual))   Pulse 67   Temp 97 °F (36.1 °C)   Resp 18   Ht 5' 4" (1.626 m)   Wt 81.6 kg (180 lb)   SpO2 97%   BMI 30.90 kg/m²     Physical Exam  Constitutional:       General: She is not in acute distress.     Appearance: Normal appearance.   Cardiovascular:      Rate and Rhythm: Normal rate and regular rhythm.   Pulmonary:      Effort: Pulmonary effort is normal.      Breath sounds: Normal breath sounds.   Abdominal:      General: Abdomen is flat. Bowel sounds are normal.      Palpations: Abdomen is soft.   Skin:     General: Skin is warm.   Neurological:      Mental Status: She is alert.   Psychiatric:         Mood and Affect: Mood normal.         Behavior: Behavior normal.         Thought Content: Thought content normal.         Judgment: Judgment normal.     Recent Results (from the past 504 hour(s))   Hemoglobin A1C    Collection Time: 11/22/22 10:29 AM   Result Value Ref Range    Hemoglobin A1c 8.8 (H) <=7.0 %    Estimated Average Glucose 205.9 mg/dL   Comprehensive Metabolic Panel    Collection Time: 11/22/22 10:29 AM   Result Value Ref Range    Sodium Level 145 136 - 145 mmol/L    Potassium Level 4.1 3.5 - 5.1 mmol/L    Chloride 106 98 - 107 mmol/L    Carbon Dioxide 29 23 - 31 mmol/L    Glucose Level 96 82 - 115 mg/dL    Blood Urea Nitrogen 15.0 9.8 - 20.1 mg/dL    Creatinine 0.81 0.55 - 1.02 mg/dL    Calcium Level Total 9.9 8.4 - 10.2 mg/dL    Protein Total 7.3 5.8 - 7.6 gm/dL    Albumin Level 3.6 3.4 - 4.8 gm/dL    Globulin 3.7 (H) 2.4 - 3.5 gm/dL    Albumin/Globulin Ratio 1.0 (L) 1.1 - 2.0 ratio    Bilirubin Total 0.6 <=1.5 mg/dL    Alkaline Phosphatase 98 40 - 150 unit/L    Alanine Aminotransferase 27 0 - 55 unit/L    Aspartate Aminotransferase 26 5 - 34 unit/L    eGFR >60 mls/min/1.73/m2   Hepatitis C Antibody    Collection Time: 11/22/22 10:29 AM   Result Value Ref Range    Hepatitis C Antibody Nonreactive Nonreactive   HIV 1/2 Ag/Ab (4th Gen)    " Collection Time: 11/22/22 10:29 AM   Result Value Ref Range    HIV Nonreactive Nonreactive   Lipid Panel    Collection Time: 11/22/22 10:29 AM   Result Value Ref Range    Cholesterol Total 193 <=200 mg/dL    HDL Cholesterol 65 (H) 35 - 60 mg/dL    Triglyceride 121 37 - 140 mg/dL    Cholesterol/HDL Ratio 3 0 - 5    Very Low Density Lipoprotein 24     LDL Cholesterol 104.00 50.00 - 140.00 mg/dL   Microalbumin/Creatinine Ratio, Urine    Collection Time: 11/22/22 10:29 AM   Result Value Ref Range    Urine Microalbumin 99.9 (H) <=30.0 ug/ml    Urine Creatinine 118.5 (H) 47.0 - 110.0 mg/dL    Microalbumin Creatinine Ratio 84.3 (H) 0.0 - 30.0 mg/gm Cr   CBC with Differential    Collection Time: 11/22/22 10:29 AM   Result Value Ref Range    WBC 5.7 4.5 - 11.5 x10(3)/mcL    RBC 4.65 4.20 - 5.40 x10(6)/mcL    Hgb 14.4 12.0 - 16.0 gm/dL    Hct 45.2 37.0 - 47.0 %    MCV 97.2 (H) 80.0 - 94.0 fL    MCH 31.0 27.0 - 31.0 pg    MCHC 31.9 (L) 33.0 - 36.0 mg/dL    RDW 12.4 11.5 - 17.0 %    Platelet 286 130 - 400 x10(3)/mcL    MPV 8.9 7.4 - 10.4 fL    Neut % 44.2 %    Lymph % 41.5 %    Mono % 9.1 %    Eos % 3.8 %    Basophil % 1.2 %    Lymph # 2.38 0.6 - 4.6 x10(3)/mcL    Neut # 2.5 2.1 - 9.2 x10(3)/mcL    Mono # 0.52 0.1 - 1.3 x10(3)/mcL    Eos # 0.22 0 - 0.9 x10(3)/mcL    Baso # 0.07 0 - 0.2 x10(3)/mcL    IG# 0.01 0 - 0.04 x10(3)/mcL    IG% 0.2 %    NRBC% 0.0 %            No flowsheet data found.  Fall Risk Assessment - Outpatient 11/29/2022 7/14/2022   Mobility Status Ambulatory Ambulatory w/ assistance   Number of falls 0 0   Identified as fall risk 0 0           Depression Screening  Over the past two weeks, has the patient felt down, depressed, or hopeless?: No  Over the past two weeks, has the patient felt little interest or pleasure in doing things?: No  Functional Ability/Safety Screening  Was the patient's timed Up & Go test unsteady or longer than 30 seconds?: No  Does the patient need help with phone, transportation,  "shopping, preparing meals, housework, laundry, meds, or managing money?: No  Does the patient's home have rugs in the hallway, lack grab bars in the bathroom, lack handrails on the stairs or have poor lighting?: No  Have you noticed any hearing difficulties?: No  A "Yes" response to any of the above questions should trigger further investigation.: n  Cognitive Function (Assessed through direct observation with due consideration of information obtained by way of patient reports and/or concerns raised by family, friends, caretakers, or others)    Does the patient repeat questions/statements in the same day?: No  Does the patient have trouble remembering the date, year, and time?: No  Does the patient have difficulty managing finances?: No  Does the patient have a decreased sense of direction?: No  A "Yes" response to any of the above questions could indicate mild cognitive impairment and should trigger further investigation.: n  Assessment/Plan:     1. Wellness examination  -     Cologuard Screening (Multitarget Stool DNA); Future; Expected date: 11/29/2022  -     DXA Bone Density Spine And Hip; Future; Expected date: 11/29/2022  Lab work reviewed with patient  Continue current medication  Continue diet/exercise  Advanced directive discussed with patient  Return to clinic with any concerns    2. S/P AKA (above knee amputation), right    3. Type 2 diabetes mellitus without complication, with long-term current use of insulin  Lab work discussed   Titrate Lantus for better CBGS control    4. Colon cancer screening  -     Cologuard Screening (Multitarget Stool DNA); Future; Expected date: 11/29/2022  Patient defers colonoscopy; schedule Cologuard    5. Screening mammogram, encounter for  -     Mammo Digital Screening Bilat w/ Klever; Future; Expected date: 11/29/2022  Schedule mammogram    6. Age-related osteoporosis without current pathological fracture  -     DXA Bone Density Spine And Hip; Future; Expected date: " 11/29/2022  Schedule bone density scan      Medicare Annual Wellness and Personalized Prevention Plan:   Fall Risk + Home Safety + Hearing Impairment + Depression Screen + Opioid and Substance Abuse Screening + Cognitive Impairment Screen + Health Risk Assessment all reviewed.     Health Maintenance Topics with due status: Not Due       Topic Last Completion Date    Diabetes Urine Screening 11/22/2022    Lipid Panel 11/22/2022    Hemoglobin A1c 11/22/2022      The patient's Health Maintenance was reviewed and the following appears to be due at this time:   Health Maintenance Due   Topic Date Due    COVID-19 Vaccine (1) Never done    Pneumococcal Vaccines (Age 65+) (1 - PCV) Never done    Foot Exam  Never done    Eye Exam  Never done    TETANUS VACCINE  Never done    DEXA Scan  Never done    Colorectal Cancer Screening  Never done    Shingles Vaccine (1 of 2) Never done    Influenza Vaccine (1) Never done    Mammogram  11/16/2022       Advance Care Planning   I attest to discussing Advance Care Planning with patient and/or family member.  Education was provided including the importance of the Health Care Power of , Advance Directives, and/or [place] documentation.  The patient expressed understanding to the importance of this information and discussion.         Follow up in about 6 months (around 5/29/2023). In addition to their scheduled follow up, the patient has also been instructed to follow up on as needed basis.

## 2022-12-27 LAB — NONINV COLON CA DNA+OCC BLD SCRN STL QL: NORMAL

## 2023-01-19 LAB — NONINV COLON CA DNA+OCC BLD SCRN STL QL: NEGATIVE

## 2023-03-29 DIAGNOSIS — Z79.4 TYPE 2 DIABETES MELLITUS WITHOUT COMPLICATION, WITH LONG-TERM CURRENT USE OF INSULIN: Primary | ICD-10-CM

## 2023-03-29 DIAGNOSIS — E11.9 TYPE 2 DIABETES MELLITUS WITHOUT COMPLICATION, WITH LONG-TERM CURRENT USE OF INSULIN: Primary | ICD-10-CM

## 2023-05-12 ENCOUNTER — DOCUMENTATION ONLY (OUTPATIENT)
Dept: ADMINISTRATIVE | Facility: HOSPITAL | Age: 68
End: 2023-05-12
Payer: MEDICARE

## 2023-05-22 ENCOUNTER — HOSPITAL ENCOUNTER (OUTPATIENT)
Dept: RADIOLOGY | Facility: HOSPITAL | Age: 68
Discharge: HOME | End: 2023-05-22
Attending: UROLOGY
Payer: MEDICARE

## 2023-05-22 DIAGNOSIS — R31.0 GROSS HEMATURIA: ICD-10-CM

## 2023-05-22 PROCEDURE — 76770 US EXAM ABDO BACK WALL COMP: CPT | Mod: TC

## 2023-06-09 DIAGNOSIS — Z79.4 TYPE 2 DIABETES MELLITUS WITHOUT COMPLICATION, WITH LONG-TERM CURRENT USE OF INSULIN: Primary | ICD-10-CM

## 2023-06-09 DIAGNOSIS — E11.9 TYPE 2 DIABETES MELLITUS WITHOUT COMPLICATION, WITH LONG-TERM CURRENT USE OF INSULIN: Primary | ICD-10-CM

## 2023-06-12 ENCOUNTER — LAB VISIT (OUTPATIENT)
Dept: LAB | Facility: HOSPITAL | Age: 68
End: 2023-06-12
Attending: FAMILY MEDICINE
Payer: MEDICARE

## 2023-06-12 DIAGNOSIS — E11.9 TYPE 2 DIABETES MELLITUS WITHOUT COMPLICATION, WITH LONG-TERM CURRENT USE OF INSULIN: ICD-10-CM

## 2023-06-12 DIAGNOSIS — Z79.4 TYPE 2 DIABETES MELLITUS WITHOUT COMPLICATION, WITH LONG-TERM CURRENT USE OF INSULIN: ICD-10-CM

## 2023-06-12 LAB
EST. AVERAGE GLUCOSE BLD GHB EST-MCNC: 194.4 MG/DL
HBA1C MFR BLD: 8.4 %

## 2023-06-12 PROCEDURE — 36415 COLL VENOUS BLD VENIPUNCTURE: CPT

## 2023-06-12 PROCEDURE — 83036 HEMOGLOBIN GLYCOSYLATED A1C: CPT

## 2023-06-12 NOTE — PROGRESS NOTES
Please inform patient of results.     1. Titrate Lantus for better CBG control    Other labwork within acceptable ranges.

## 2023-06-13 ENCOUNTER — DOCUMENTATION ONLY (OUTPATIENT)
Dept: ADMINISTRATIVE | Facility: HOSPITAL | Age: 68
End: 2023-06-13
Payer: MEDICARE

## 2023-06-13 NOTE — PROGRESS NOTES
Population Health Chart Review & Patient Outreach Details:     Reason for Outreach Encounter:     []  Non-Compliant Report   [x]  Payor Report ([employer], PHN, BCBS, MSSP, MCIP, C, etc.)   []  Pre-Visit Chart Review     Updates Requested / Reviewed:     []  Care Everywhere    []     [x]  External Sources (requested records)   []  Care Team Updated    Patient Outreach Method:    []  Telephone Outreach Completed   [] Successful   [] Left Voicemail   [] Unable to Contact (wrong number, no voicemail)  []  [facility] Outreach Sent  []  Letter Outreach Mailed  [x]  Fax Sent for External Records  []  External Records Upload    Health Maintenance Topics Addressed and Outreach Outcomes / Actions Taken:        []      Breast Cancer Screening []  Mammo Scheduled      []  External Records Requested     []  Added Reminder to Complete to Upcoming Primary Care Appt Notes     []  Patient Declined     []  Patient Will Call Back to Schedule     []  Patient Will Schedule with External Provider / Order Routed if Applicable             []       Cervical Cancer Screening []  Pap Scheduled      []  External Records Requested     []  Added Reminder to Complete to Upcoming Primary Care Appt Notes     []  Patient Declined     []  Patient Will Call Back to Schedule     []  Patient Will Schedule with External Provider               []          Colorectal Cancer Screening []  Colonoscopy Case Request or Referral Placed     []  External Records Requested     []  Added Reminder to Complete to Upcoming Primary Care Appt Notes     []  Patient Declined     []  Patient Will Call Back to Schedule     []  Patient Will Schedule with External Provider     []  Fit Kit Mailed (add the SmartPhrase under additional notes)     []  Reminded Patient to Complete Home Test             [x]      Diabetic Eye Exam []  Eye Camera Scheduled or Optometry Referral Placed     [x]  External Records Requested     []  Added Reminder to Complete to Upcoming  Primary Care Appt Notes     []  Patient Declined     []  Patient Will Call Back to Schedule     []  Patient Will Schedule with External Provider             []      Blood Pressure Control []  Primary Care Follow Up Visit Scheduled     []  Remote Blood Pressure Reading Captured     []  Added Reminder to Complete to Upcoming Primary Care Appt Notes     []  Patient Declined     []  Patient Will Call Back / Patient Will Send Portal Message with Reading     []  Patient Will Call Back to Schedule Provider Visit             []       HbA1c & Other Labs []  Lab Appt Scheduled for Due Labs     []  Primary Care Follow Up Visit Scheduled      []  Reminded Patient to Complete Home Test     []  Added Reminder to Complete to Upcoming Primary Care Appt Notes     []  Patient Declined     []  Patient Will Call Back to Schedule     []  Patient Will Schedule with External Provider / Order Routed if Applicable           []    Schedule Primary Care Appt []  Primary Care Appt Scheduled     []  Patient Declined     []  Patient Will Call Back to Schedule     []  Pt Established with External Provider & Updated Care Team             []      Medication Adherence []  Primary Care Appointment Scheduled     []  Added Reminder to Upcoming Primary Care Appt Notes     []  Patient Reminded to  Prescription     []  Patient Declined, Provider Notified if Needed     []  Sent Provider Message to Review and/or Add Exclusion to Problem List             []      Osteoporosis Screening []  DXA Appointment Scheduled     []  External Records Requested     []  Added Reminder to Complete to Upcoming Primary Care Appt Notes     []  Patient Declined     []  Patient Will Call Back to Schedule     []  Patient Will Schedule with External Provider / Order Routed if Applicable     Additional Care Coordinator Notes:     Requested records from Dr. Mickie Kidd    Further Action Needed If Patient Returns Outreach:      Records to be hyperlinked when received.  If  you have any questions please give me a call.    Aury Chinchilla MA - Panel Care Coordinator      [phone].

## 2023-06-13 NOTE — LETTER
AUTHORIZATION FOR RELEASE OF   CONFIDENTIAL INFORMATION    Dear Dr. Kidd,  Fax: [phone]    We are seeing Starr Kidd, date of birth 1955, in the clinic at [facility]. Obey Baker MD is the patient's PCP. Starr Kidd has an outstanding lab/procedure at the time we reviewed her chart. In order to help keep her health information updated, she has authorized us to request the following medical record(s):        (  )  MAMMOGRAM                                      (  )  COLONOSCOPY      (  )  PAP SMEAR                                          (  )  OUTSIDE LAB RESULTS     (  )  DEXA SCAN                                          ( x )  DIABETIC EYE EXAM            (  )  FOOT EXAM                                          (  )  ENTIRE RECORD     (  )  OUTSIDE IMMUNIZATIONS                 (  )  _______________         Please fax records to [facility], Scott James Bergeaux, MD, [phone].           Patient Name: Starr Kidd  : 1955  Patient Phone #: [phone]

## 2023-06-14 ENCOUNTER — PATIENT OUTREACH (OUTPATIENT)
Dept: ADMINISTRATIVE | Facility: HOSPITAL | Age: 68
End: 2023-06-14
Payer: MEDICARE

## 2023-06-14 ENCOUNTER — TELEPHONE (OUTPATIENT)
Dept: FAMILY MEDICINE | Facility: CLINIC | Age: 68
End: 2023-06-14
Payer: MEDICARE

## 2023-06-14 NOTE — PROGRESS NOTES
Population Health. Out Reach. Reviewing patient's chart for quality metrics.  The following record(s)  below were uploaded for Health Maintenance .    DM EYE EXAM   10/12/21

## 2023-06-14 NOTE — LETTER
AUTHORIZATION FOR RELEASE OF   CONFIDENTIAL INFORMATION    Dear Dr. Kidd, Fax [phone]    We are seeing Starr Kidd, date of birth 1955, in the clinic at [facility]. Obey Baker MD is the patient's PCP. Starr Kidd has an outstanding lab/procedure at the time we reviewed her chart. In order to help keep her health information updated, she has authorized us to request the following medical record(s):        (  )  MAMMOGRAM                                      (  )  COLONOSCOPY      (  )  PAP SMEAR                                          (  )  OUTSIDE LAB RESULTS     (  )  DEXA SCAN                                          ( X )  EYE EXAM            (  )  FOOT EXAM                                          (  )  ENTIRE RECORD     (  )  OUTSIDE IMMUNIZATIONS                 (  )  _______________         Please fax records to [facility], Scott James Bergeaux, MD, [phone] or [phone].       If you have any questions you can contact Rin Lynne at [phone].         Patient Name: Starr Kidd  : 1955  Patient Phone #: [phone]

## 2023-06-14 NOTE — TELEPHONE ENCOUNTER
----- Message from Obey Baker MD sent at 6/12/2023  5:23 PM CDT -----  Please inform patient of results.     1. Titrate Lantus for better CBG control    Other labwork within acceptable ranges.
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
## 2023-06-29 ENCOUNTER — OFFICE VISIT (OUTPATIENT)
Dept: FAMILY MEDICINE | Facility: CLINIC | Age: 68
End: 2023-06-29
Payer: MEDICARE

## 2023-06-29 VITALS
WEIGHT: 175 LBS | BODY MASS INDEX: 29.88 KG/M2 | TEMPERATURE: 97 F | HEART RATE: 82 BPM | HEIGHT: 64 IN | DIASTOLIC BLOOD PRESSURE: 66 MMHG | RESPIRATION RATE: 16 BRPM | SYSTOLIC BLOOD PRESSURE: 120 MMHG | OXYGEN SATURATION: 97 %

## 2023-06-29 DIAGNOSIS — S78.111A UNILATERAL AKA, RIGHT: ICD-10-CM

## 2023-06-29 DIAGNOSIS — Z12.31 SCREENING MAMMOGRAM, ENCOUNTER FOR: ICD-10-CM

## 2023-06-29 DIAGNOSIS — M46.1 SACROILIITIS: ICD-10-CM

## 2023-06-29 DIAGNOSIS — Z79.4 TYPE 2 DIABETES MELLITUS WITHOUT COMPLICATION, WITH LONG-TERM CURRENT USE OF INSULIN: Primary | ICD-10-CM

## 2023-06-29 DIAGNOSIS — I70.245 CRITICAL LIMB ISCHEMIA OF LEFT LOWER EXTREMITY WITH ULCERATION OF FOOT: ICD-10-CM

## 2023-06-29 DIAGNOSIS — E11.9 TYPE 2 DIABETES MELLITUS WITHOUT COMPLICATION, WITH LONG-TERM CURRENT USE OF INSULIN: Primary | ICD-10-CM

## 2023-06-29 PROCEDURE — 3078F DIAST BP <80 MM HG: CPT | Mod: CPTII,,, | Performed by: FAMILY MEDICINE

## 2023-06-29 PROCEDURE — 1159F PR MEDICATION LIST DOCUMENTED IN MEDICAL RECORD: ICD-10-PCS | Mod: CPTII,,, | Performed by: FAMILY MEDICINE

## 2023-06-29 PROCEDURE — 1125F AMNT PAIN NOTED PAIN PRSNT: CPT | Mod: CPTII,,, | Performed by: FAMILY MEDICINE

## 2023-06-29 PROCEDURE — 3074F SYST BP LT 130 MM HG: CPT | Mod: CPTII,,, | Performed by: FAMILY MEDICINE

## 2023-06-29 PROCEDURE — 3288F FALL RISK ASSESSMENT DOCD: CPT | Mod: CPTII,,, | Performed by: FAMILY MEDICINE

## 2023-06-29 PROCEDURE — 1160F RVW MEDS BY RX/DR IN RCRD: CPT | Mod: CPTII,,, | Performed by: FAMILY MEDICINE

## 2023-06-29 PROCEDURE — 99214 PR OFFICE/OUTPT VISIT, EST, LEVL IV, 30-39 MIN: ICD-10-PCS | Mod: ,,, | Performed by: FAMILY MEDICINE

## 2023-06-29 PROCEDURE — 1101F PR PT FALLS ASSESS DOC 0-1 FALLS W/OUT INJ PAST YR: ICD-10-PCS | Mod: CPTII,,, | Performed by: FAMILY MEDICINE

## 2023-06-29 PROCEDURE — 1160F PR REVIEW ALL MEDS BY PRESCRIBER/CLIN PHARMACIST DOCUMENTED: ICD-10-PCS | Mod: CPTII,,, | Performed by: FAMILY MEDICINE

## 2023-06-29 PROCEDURE — 3008F BODY MASS INDEX DOCD: CPT | Mod: CPTII,,, | Performed by: FAMILY MEDICINE

## 2023-06-29 PROCEDURE — 3008F PR BODY MASS INDEX (BMI) DOCUMENTED: ICD-10-PCS | Mod: CPTII,,, | Performed by: FAMILY MEDICINE

## 2023-06-29 PROCEDURE — 99214 OFFICE O/P EST MOD 30 MIN: CPT | Mod: ,,, | Performed by: FAMILY MEDICINE

## 2023-06-29 PROCEDURE — 3078F PR MOST RECENT DIASTOLIC BLOOD PRESSURE < 80 MM HG: ICD-10-PCS | Mod: CPTII,,, | Performed by: FAMILY MEDICINE

## 2023-06-29 PROCEDURE — 3074F PR MOST RECENT SYSTOLIC BLOOD PRESSURE < 130 MM HG: ICD-10-PCS | Mod: CPTII,,, | Performed by: FAMILY MEDICINE

## 2023-06-29 PROCEDURE — 1159F MED LIST DOCD IN RCRD: CPT | Mod: CPTII,,, | Performed by: FAMILY MEDICINE

## 2023-06-29 PROCEDURE — 1101F PT FALLS ASSESS-DOCD LE1/YR: CPT | Mod: CPTII,,, | Performed by: FAMILY MEDICINE

## 2023-06-29 PROCEDURE — 3288F PR FALLS RISK ASSESSMENT DOCUMENTED: ICD-10-PCS | Mod: CPTII,,, | Performed by: FAMILY MEDICINE

## 2023-06-29 PROCEDURE — 1125F PR PAIN SEVERITY QUANTIFIED, PAIN PRESENT: ICD-10-PCS | Mod: CPTII,,, | Performed by: FAMILY MEDICINE

## 2023-06-29 RX ORDER — EZETIMIBE 10 MG/1
10 TABLET ORAL
COMMUNITY
Start: 2023-06-28

## 2023-06-29 RX ORDER — MELOXICAM 15 MG/1
15 TABLET ORAL DAILY
Qty: 30 TABLET | Refills: 3 | Status: SHIPPED | OUTPATIENT
Start: 2023-06-29 | End: 2024-01-02

## 2023-06-29 RX ORDER — CLOPIDOGREL BISULFATE 75 MG/1
75 TABLET ORAL
COMMUNITY
Start: 2023-06-28

## 2023-06-29 NOTE — PROGRESS NOTES
"Subjective:       Patient ID: Starr Kidd is a 67 y.o. female.    Chief Complaint: Follow-up (6 month f/u ) and Leg Pain (From a blood clot on her left foot causing burning )      Routine    Follow-up    Leg Pain     Diabetes  - tolerating medication (no side-effects)  - DXT: 's  - watching diet    Review of Systems   Constitutional: Negative.    Respiratory: Negative.     Cardiovascular: Negative.    Gastrointestinal: Negative.    Musculoskeletal:  Positive for back pain (History of sacroiliitis, reports Mobic no longer working as well) and leg pain (seen by Dr Finley).   Psychiatric/Behavioral: Negative.           Objective:      /66 (BP Location: Left arm, Patient Position: Sitting)   Pulse 82   Temp 96.9 °F (36.1 °C) (Temporal)   Resp 16   Ht 5' 4" (1.626 m)   Wt 79.4 kg (175 lb)   SpO2 97%   BMI 30.04 kg/m²    Physical Exam  Constitutional:       Appearance: Normal appearance.   Cardiovascular:      Rate and Rhythm: Normal rate and regular rhythm.      Pulses:           Dorsalis pedis pulses are 1+ on the left side.        Posterior tibial pulses are 1+ on the left side.      Heart sounds: Normal heart sounds.   Pulmonary:      Effort: Pulmonary effort is normal.      Breath sounds: Normal breath sounds.   Musculoskeletal:      Left foot: Normal range of motion. No deformity.      Right Lower Extremity: Right leg is amputated above knee.   Feet:      Left foot:      Protective Sensation: 5 sites tested.  5 sites sensed.      Skin integrity: Callus present.      Toenail Condition: Left toenails are normal.   Neurological:      Mental Status: She is alert.   Psychiatric:         Mood and Affect: Mood normal.         Behavior: Behavior normal.         Thought Content: Thought content normal.         Judgment: Judgment normal.           Recent Results (from the past 504 hour(s))   Hemoglobin A1C    Collection Time: 06/12/23  1:23 PM   Result Value Ref Range    Hemoglobin A1c 8.4 (H) " <=7.0 %    Estimated Average Glucose 194.4 mg/dL        Assessment:       Problem List Items Addressed This Visit          Endocrine    Diabetes mellitus - Primary    Relevant Orders    Diabetes Digital Medicine (DDMP) Enrollment Order (Completed)    NURSING COMMUNICATION: Create [facility]       Orthopedic    Sacroiliitis (Chronic)    Current Assessment & Plan     Continue current care   Continue current medication         Relevant Medications    meloxicam (MOBIC) 15 MG tablet       Other    Critical limb ischemia of left lower extremity with ulceration of foot    Current Assessment & Plan     Followed by Dr Finley  Currently on medication for clot in leg bypass          Other Visit Diagnoses       Screening mammogram, encounter for                   Plan:   1. Type 2 diabetes mellitus without complication, with long-term current use of insulin  -     Diabetes Digital Medicine (DDMP) Enrollment Order  -     NURSING COMMUNICATION: [facility]  Lab work reviewed with patient   Discussed titrating Lantus for better CBGS control  Enroll patient in digital medicine  Diet modification   Diabetic foot exam performed  Return to clinic with any concerns     2. Critical limb ischemia of left lower extremity with ulceration of foot  Assessment & Plan:  Followed by Dr Finley  Currently on medication for clot in leg bypass    3. Sacroiliitis  Assessment & Plan:  Continue current care   Continue current medication    Orders:  -     meloxicam (MOBIC) 15 MG tablet; Take 1 tablet (15 mg total) by mouth once daily.  Dispense: 30 tablet; Refill: 3    4. Screening mammogram, encounter for  Patient adamantly defers mammogram

## 2023-07-18 DIAGNOSIS — Z79.4 TYPE 2 DIABETES MELLITUS WITHOUT COMPLICATION, WITH LONG-TERM CURRENT USE OF INSULIN: ICD-10-CM

## 2023-07-18 DIAGNOSIS — E11.9 TYPE 2 DIABETES MELLITUS WITHOUT COMPLICATION, WITH LONG-TERM CURRENT USE OF INSULIN: ICD-10-CM

## 2023-07-18 RX ORDER — DAPAGLIFLOZIN 10 MG/1
TABLET, FILM COATED ORAL
Qty: 30 TABLET | Refills: 4 | Status: SHIPPED | OUTPATIENT
Start: 2023-07-18 | End: 2024-02-15

## 2023-07-31 DIAGNOSIS — E11.9 TYPE 2 DIABETES MELLITUS WITHOUT COMPLICATION, WITH LONG-TERM CURRENT USE OF INSULIN: Primary | ICD-10-CM

## 2023-07-31 DIAGNOSIS — Z79.4 TYPE 2 DIABETES MELLITUS WITHOUT COMPLICATION, WITH LONG-TERM CURRENT USE OF INSULIN: Primary | ICD-10-CM

## 2023-07-31 DIAGNOSIS — Z12.31 BREAST CANCER SCREENING BY MAMMOGRAM: ICD-10-CM

## 2023-08-15 ENCOUNTER — HOSPITAL ENCOUNTER (OUTPATIENT)
Dept: RADIOLOGY | Facility: HOSPITAL | Age: 68
Discharge: HOME | End: 2023-08-15
Attending: SURGERY
Payer: MEDICARE

## 2023-08-15 DIAGNOSIS — Z01.811 PRE-OP CHEST EXAM: ICD-10-CM

## 2023-08-15 DIAGNOSIS — Z01.818 OTHER SPECIFIED PRE-OPERATIVE EXAMINATION: Primary | ICD-10-CM

## 2023-08-15 PROCEDURE — 71046 X-RAY EXAM CHEST 2 VIEWS: CPT | Mod: TC

## 2023-08-16 DIAGNOSIS — E11.9 TYPE 2 DIABETES MELLITUS WITHOUT COMPLICATION, WITH LONG-TERM CURRENT USE OF INSULIN: Primary | ICD-10-CM

## 2023-08-16 DIAGNOSIS — Z79.4 TYPE 2 DIABETES MELLITUS WITHOUT COMPLICATION, WITH LONG-TERM CURRENT USE OF INSULIN: Primary | ICD-10-CM

## 2023-08-16 RX ORDER — INSULIN GLARGINE 100 [IU]/ML
INJECTION, SOLUTION SUBCUTANEOUS
Qty: 30 ML | Refills: 3 | Status: SHIPPED | OUTPATIENT
Start: 2023-08-16

## 2023-08-21 ENCOUNTER — DOCUMENTATION ONLY (OUTPATIENT)
Dept: ADMINISTRATIVE | Facility: HOSPITAL | Age: 68
End: 2023-08-21
Payer: MEDICARE

## 2023-08-21 DIAGNOSIS — E11.9 TYPE 2 DIABETES MELLITUS WITHOUT COMPLICATION, WITHOUT LONG-TERM CURRENT USE OF INSULIN: Primary | ICD-10-CM

## 2023-08-25 ENCOUNTER — PATIENT OUTREACH (OUTPATIENT)
Dept: ADMINISTRATIVE | Facility: HOSPITAL | Age: 68
End: 2023-08-25
Payer: MEDICARE

## 2023-08-25 DIAGNOSIS — M79.10 MYALGIA DUE TO STATIN: Primary | ICD-10-CM

## 2023-08-25 DIAGNOSIS — T46.6X5A MYALGIA DUE TO STATIN: Primary | ICD-10-CM

## 2023-08-25 NOTE — PROGRESS NOTES
Population Health. Out Reach.  The following record(s)  below were uploaded for Health Maintenance .    DM EYE EXAM   9/13/22

## 2023-08-28 PROBLEM — Z78.9 STATIN INTOLERANCE: Status: ACTIVE | Noted: 2023-08-28

## 2023-08-29 DIAGNOSIS — S88.919A: ICD-10-CM

## 2023-08-29 DIAGNOSIS — N30.00 ACUTE CYSTITIS WITHOUT HEMATURIA: ICD-10-CM

## 2023-08-29 DIAGNOSIS — N30.00 ACUTE CYSTITIS WITHOUT HEMATURIA: Primary | ICD-10-CM

## 2023-08-29 RX ORDER — CEFDINIR 300 MG/1
300 CAPSULE ORAL 2 TIMES DAILY
Qty: 20 CAPSULE | Refills: 0 | Status: SHIPPED | OUTPATIENT
Start: 2023-08-29 | End: 2023-09-08

## 2023-08-29 RX ORDER — CEFDINIR 300 MG/1
300 CAPSULE ORAL 2 TIMES DAILY
Qty: 20 CAPSULE | Refills: 0 | Status: SHIPPED | OUTPATIENT
Start: 2023-08-29 | End: 2023-08-29 | Stop reason: SDUPTHER

## 2023-08-31 DIAGNOSIS — N30.00 ACUTE CYSTITIS WITHOUT HEMATURIA: Primary | ICD-10-CM

## 2023-08-31 RX ORDER — LEVOFLOXACIN 500 MG/1
500 TABLET, FILM COATED ORAL DAILY
Qty: 7 TABLET | Refills: 0 | Status: SHIPPED | OUTPATIENT
Start: 2023-08-31 | End: 2023-10-05

## 2023-09-20 ENCOUNTER — LAB REQUISITION (OUTPATIENT)
Dept: LAB | Facility: HOSPITAL | Age: 68
End: 2023-09-20
Payer: MEDICARE

## 2023-09-20 DIAGNOSIS — M86.8X6 OTHER OSTEOMYELITIS, LOWER LEG: ICD-10-CM

## 2023-09-20 DIAGNOSIS — Z89.612 ACQUIRED ABSENCE OF LEFT LEG ABOVE KNEE: ICD-10-CM

## 2023-09-20 DIAGNOSIS — Z89.611 ACQUIRED ABSENCE OF RIGHT LEG ABOVE KNEE: ICD-10-CM

## 2023-09-20 LAB
APPEARANCE UR: ABNORMAL
BACTERIA #/AREA URNS AUTO: ABNORMAL /HPF
BILIRUB UR QL STRIP.AUTO: NEGATIVE
COLOR UR: YELLOW
CREAT UR-MCNC: 42.4 MG/DL (ref 45–106)
EST. AVERAGE GLUCOSE BLD GHB EST-MCNC: 165.7 MG/DL
GLUCOSE UR QL STRIP.AUTO: ABNORMAL
HBA1C MFR BLD: 7.4 %
KETONES UR QL STRIP.AUTO: NEGATIVE
LEUKOCYTE ESTERASE UR QL STRIP.AUTO: NEGATIVE
MICROALBUMIN UR-MCNC: 9.1 UG/ML
MICROALBUMIN/CREAT RATIO PNL UR: 21.5 MG/GM CR (ref 0–30)
NITRITE UR QL STRIP.AUTO: NEGATIVE
PH UR STRIP.AUTO: 7 [PH]
PROT UR QL STRIP.AUTO: NEGATIVE
RBC #/AREA URNS AUTO: ABNORMAL /HPF
RBC UR QL AUTO: ABNORMAL
SP GR UR STRIP.AUTO: 1.01 (ref 1–1.03)
SQUAMOUS #/AREA URNS AUTO: ABNORMAL /HPF
UROBILINOGEN UR STRIP-ACNC: 0.2
WBC #/AREA URNS AUTO: ABNORMAL /HPF
YEAST URNS QL MICRO: ABNORMAL /HPF

## 2023-09-20 PROCEDURE — 87088 URINE BACTERIA CULTURE: CPT | Performed by: FAMILY MEDICINE

## 2023-09-20 PROCEDURE — 81001 URINALYSIS AUTO W/SCOPE: CPT | Performed by: FAMILY MEDICINE

## 2023-09-20 PROCEDURE — 82043 UR ALBUMIN QUANTITATIVE: CPT | Performed by: FAMILY MEDICINE

## 2023-09-20 PROCEDURE — 83036 HEMOGLOBIN GLYCOSYLATED A1C: CPT | Performed by: FAMILY MEDICINE

## 2023-09-22 LAB — BACTERIA UR CULT: NORMAL

## 2023-09-25 ENCOUNTER — DOCUMENTATION ONLY (OUTPATIENT)
Dept: FAMILY MEDICINE | Facility: CLINIC | Age: 68
End: 2023-09-25

## 2023-09-25 ENCOUNTER — OFFICE VISIT (OUTPATIENT)
Dept: FAMILY MEDICINE | Facility: CLINIC | Age: 68
End: 2023-09-25
Payer: MEDICARE

## 2023-09-25 VITALS — TEMPERATURE: 98 F | BODY MASS INDEX: 29.18 KG/M2 | WEIGHT: 170 LBS

## 2023-09-25 DIAGNOSIS — R53.1 WEAKNESS: Primary | ICD-10-CM

## 2023-09-25 LAB — HBA1C MFR BLD: 7.4 % (ref 4–6)

## 2023-09-25 PROCEDURE — 99499 UNLISTED E&M SERVICE: CPT | Mod: 95,,, | Performed by: FAMILY MEDICINE

## 2023-09-25 PROCEDURE — 99499 NO LOS: ICD-10-PCS | Mod: 95,,, | Performed by: FAMILY MEDICINE

## 2023-09-25 NOTE — PROGRESS NOTES
Subjective:       Patient ID: Starr Kidd is a 67 y.o. female.    Chief Complaint: routine, a1c, francois lift, power wheelchair,      HPI    Review of Systems        Objective:      Temp 97.6 °F (36.4 °C)   Wt 77.1 kg (170 lb)   BMI 29.18 kg/m²    Physical Exam          Assessment:       Problem List Items Addressed This Visit    None         Plan:   {There are no diagnoses linked to this encounter. (Refresh or delete this SmartLink)}

## 2023-10-05 ENCOUNTER — OFFICE VISIT (OUTPATIENT)
Dept: FAMILY MEDICINE | Facility: CLINIC | Age: 68
End: 2023-10-05
Payer: MEDICARE

## 2023-10-05 VITALS
TEMPERATURE: 97 F | WEIGHT: 135 LBS | BODY MASS INDEX: 23.05 KG/M2 | HEIGHT: 64 IN | OXYGEN SATURATION: 97 % | HEART RATE: 99 BPM | RESPIRATION RATE: 18 BRPM | DIASTOLIC BLOOD PRESSURE: 60 MMHG | SYSTOLIC BLOOD PRESSURE: 114 MMHG

## 2023-10-05 DIAGNOSIS — G62.9 NEUROPATHY: ICD-10-CM

## 2023-10-05 DIAGNOSIS — Z89.611 S/P AKA (ABOVE KNEE AMPUTATION) BILATERAL: Primary | ICD-10-CM

## 2023-10-05 DIAGNOSIS — Z89.612 S/P AKA (ABOVE KNEE AMPUTATION) BILATERAL: ICD-10-CM

## 2023-10-05 DIAGNOSIS — Z89.612 S/P AKA (ABOVE KNEE AMPUTATION) BILATERAL: Primary | ICD-10-CM

## 2023-10-05 DIAGNOSIS — E11.9 TYPE 2 DIABETES MELLITUS WITHOUT COMPLICATION, WITH LONG-TERM CURRENT USE OF INSULIN: Primary | ICD-10-CM

## 2023-10-05 DIAGNOSIS — Z89.611 S/P AKA (ABOVE KNEE AMPUTATION) BILATERAL: ICD-10-CM

## 2023-10-05 DIAGNOSIS — Z79.4 TYPE 2 DIABETES MELLITUS WITHOUT COMPLICATION, WITH LONG-TERM CURRENT USE OF INSULIN: Primary | ICD-10-CM

## 2023-10-05 PROCEDURE — 3066F NEPHROPATHY DOC TX: CPT | Mod: CPTII,,, | Performed by: FAMILY MEDICINE

## 2023-10-05 PROCEDURE — 1159F MED LIST DOCD IN RCRD: CPT | Mod: CPTII,,, | Performed by: FAMILY MEDICINE

## 2023-10-05 PROCEDURE — 3051F PR MOST RECENT HEMOGLOBIN A1C LEVEL 7.0 - < 8.0%: ICD-10-PCS | Mod: CPTII,,, | Performed by: FAMILY MEDICINE

## 2023-10-05 PROCEDURE — 3051F HG A1C>EQUAL 7.0%<8.0%: CPT | Mod: CPTII,,, | Performed by: FAMILY MEDICINE

## 2023-10-05 PROCEDURE — 99214 OFFICE O/P EST MOD 30 MIN: CPT | Mod: ,,, | Performed by: FAMILY MEDICINE

## 2023-10-05 PROCEDURE — 3078F PR MOST RECENT DIASTOLIC BLOOD PRESSURE < 80 MM HG: ICD-10-PCS | Mod: CPTII,,, | Performed by: FAMILY MEDICINE

## 2023-10-05 PROCEDURE — 3074F PR MOST RECENT SYSTOLIC BLOOD PRESSURE < 130 MM HG: ICD-10-PCS | Mod: CPTII,,, | Performed by: FAMILY MEDICINE

## 2023-10-05 PROCEDURE — 3066F PR DOCUMENTATION OF TREATMENT FOR NEPHROPATHY: ICD-10-PCS | Mod: CPTII,,, | Performed by: FAMILY MEDICINE

## 2023-10-05 PROCEDURE — 3288F PR FALLS RISK ASSESSMENT DOCUMENTED: ICD-10-PCS | Mod: CPTII,,, | Performed by: FAMILY MEDICINE

## 2023-10-05 PROCEDURE — 3061F NEG MICROALBUMINURIA REV: CPT | Mod: CPTII,,, | Performed by: FAMILY MEDICINE

## 2023-10-05 PROCEDURE — 3061F PR NEG MICROALBUMINURIA RESULT DOCUMENTED/REVIEW: ICD-10-PCS | Mod: CPTII,,, | Performed by: FAMILY MEDICINE

## 2023-10-05 PROCEDURE — 1101F PR PT FALLS ASSESS DOC 0-1 FALLS W/OUT INJ PAST YR: ICD-10-PCS | Mod: CPTII,,, | Performed by: FAMILY MEDICINE

## 2023-10-05 PROCEDURE — 3074F SYST BP LT 130 MM HG: CPT | Mod: CPTII,,, | Performed by: FAMILY MEDICINE

## 2023-10-05 PROCEDURE — 1101F PT FALLS ASSESS-DOCD LE1/YR: CPT | Mod: CPTII,,, | Performed by: FAMILY MEDICINE

## 2023-10-05 PROCEDURE — 1160F PR REVIEW ALL MEDS BY PRESCRIBER/CLIN PHARMACIST DOCUMENTED: ICD-10-PCS | Mod: CPTII,,, | Performed by: FAMILY MEDICINE

## 2023-10-05 PROCEDURE — 3078F DIAST BP <80 MM HG: CPT | Mod: CPTII,,, | Performed by: FAMILY MEDICINE

## 2023-10-05 PROCEDURE — 99214 PR OFFICE/OUTPT VISIT, EST, LEVL IV, 30-39 MIN: ICD-10-PCS | Mod: ,,, | Performed by: FAMILY MEDICINE

## 2023-10-05 PROCEDURE — 3008F BODY MASS INDEX DOCD: CPT | Mod: CPTII,,, | Performed by: FAMILY MEDICINE

## 2023-10-05 PROCEDURE — 3288F FALL RISK ASSESSMENT DOCD: CPT | Mod: CPTII,,, | Performed by: FAMILY MEDICINE

## 2023-10-05 PROCEDURE — 1160F RVW MEDS BY RX/DR IN RCRD: CPT | Mod: CPTII,,, | Performed by: FAMILY MEDICINE

## 2023-10-05 PROCEDURE — 1159F PR MEDICATION LIST DOCUMENTED IN MEDICAL RECORD: ICD-10-PCS | Mod: CPTII,,, | Performed by: FAMILY MEDICINE

## 2023-10-05 PROCEDURE — 3008F PR BODY MASS INDEX (BMI) DOCUMENTED: ICD-10-PCS | Mod: CPTII,,, | Performed by: FAMILY MEDICINE

## 2023-10-05 RX ORDER — TRAMADOL HYDROCHLORIDE 50 MG/1
50 TABLET ORAL EVERY 6 HOURS PRN
COMMUNITY
Start: 2023-09-06 | End: 2023-10-05

## 2023-10-05 RX ORDER — GABAPENTIN 300 MG/1
300 CAPSULE ORAL 2 TIMES DAILY
COMMUNITY
Start: 2023-08-28 | End: 2023-11-01 | Stop reason: SDUPTHER

## 2023-10-05 NOTE — PROGRESS NOTES
"Subjective:       Patient ID: Starr Kidd is a 67 y.o. female.    Chief Complaint: s/p amputation LLE, Insomnia with phantom pain      Routine      Diabetes  - tolerating medication (no side-effects)  - DXT: 130-150's  - watching diet    Review of Systems   Constitutional: Negative.    Respiratory: Negative.     Cardiovascular: Negative.    Gastrointestinal: Negative.    Musculoskeletal:         Bilateral AKA   Psychiatric/Behavioral: Negative.          Insomnia: having trouble falling asleep           Objective:      /60 (BP Location: Left arm, Patient Position: Sitting, BP Method: Large (Manual))   Pulse 99   Temp 97.1 °F (36.2 °C)   Resp 18   Ht 5' 4" (1.626 m)   Wt 61.2 kg (135 lb)   SpO2 97%   BMI 23.17 kg/m²    Physical Exam  Constitutional:       Appearance: Normal appearance.   Cardiovascular:      Rate and Rhythm: Normal rate and regular rhythm.      Heart sounds: Normal heart sounds.   Pulmonary:      Effort: Pulmonary effort is normal.      Breath sounds: Normal breath sounds.   Neurological:      Mental Status: She is alert.   Psychiatric:         Mood and Affect: Mood normal.         Behavior: Behavior normal.         Thought Content: Thought content normal.         Judgment: Judgment normal.               Assessment:       Problem List Items Addressed This Visit          Neuro    Neuropathy       Endocrine    Diabetes mellitus - Primary       Orthopedic    S/P AKA (above knee amputation) bilateral    Relevant Orders    Ambulatory referral/consult to Physical/Occupational Therapy          Plan:   1. Type 2 diabetes mellitus without complication, with long-term current use of insulin  Improved   Continue current medication  Monitor CBGS   Return to clinic with any concerns     2. S/P AKA (above knee amputation) bilateral  -     Ambulatory referral/consult to Physical/Occupational Therapy; Future; Expected date: 10/12/2023  Refer patient to occupational therapy for wheelchair " evaluation     3. Neuropathy  Discussed taken Neurontin 300 mg t.i.d. as prescribed   Monitor  Return to clinic with any concerns

## 2023-10-10 DIAGNOSIS — Z89.612 S/P AKA (ABOVE KNEE AMPUTATION) BILATERAL: Primary | ICD-10-CM

## 2023-10-10 DIAGNOSIS — Z89.611 S/P AKA (ABOVE KNEE AMPUTATION) BILATERAL: Primary | ICD-10-CM

## 2023-10-19 ENCOUNTER — DOCUMENT SCAN (OUTPATIENT)
Dept: HOME HEALTH SERVICES | Facility: HOSPITAL | Age: 68
End: 2023-10-19
Payer: MEDICARE

## 2023-10-30 ENCOUNTER — LAB VISIT (OUTPATIENT)
Dept: LAB | Facility: HOSPITAL | Age: 68
End: 2023-10-30
Attending: INTERNAL MEDICINE
Payer: MEDICARE

## 2023-10-30 DIAGNOSIS — E78.5 HYPERLIPEMIA: Primary | ICD-10-CM

## 2023-10-30 DIAGNOSIS — I10 ESSENTIAL HYPERTENSION, MALIGNANT: ICD-10-CM

## 2023-10-30 LAB
ALBUMIN SERPL-MCNC: 3.6 G/DL (ref 3.4–4.8)
ALBUMIN/GLOB SERPL: 1.1 RATIO (ref 1.1–2)
ALP SERPL-CCNC: 99 UNIT/L (ref 40–150)
ALT SERPL-CCNC: 22 UNIT/L (ref 0–55)
AST SERPL-CCNC: 27 UNIT/L (ref 5–34)
BILIRUB SERPL-MCNC: 0.4 MG/DL
BUN SERPL-MCNC: 20 MG/DL (ref 9.8–20.1)
CALCIUM SERPL-MCNC: 9.5 MG/DL (ref 8.4–10.2)
CHLORIDE SERPL-SCNC: 109 MMOL/L (ref 98–107)
CHOLEST SERPL-MCNC: 173 MG/DL
CHOLEST/HDLC SERPL: 3 {RATIO} (ref 0–5)
CO2 SERPL-SCNC: 25 MMOL/L (ref 23–31)
CREAT SERPL-MCNC: 0.7 MG/DL (ref 0.55–1.02)
GFR SERPLBLD CREATININE-BSD FMLA CKD-EPI: >60 MLS/MIN/1.73/M2
GLOBULIN SER-MCNC: 3.4 GM/DL (ref 2.4–3.5)
GLUCOSE SERPL-MCNC: 98 MG/DL (ref 82–115)
HDLC SERPL-MCNC: 67 MG/DL (ref 35–60)
LDLC SERPL CALC-MCNC: 83 MG/DL (ref 50–140)
POTASSIUM SERPL-SCNC: 3.9 MMOL/L (ref 3.5–5.1)
PROT SERPL-MCNC: 7 GM/DL (ref 5.8–7.6)
SODIUM SERPL-SCNC: 144 MMOL/L (ref 136–145)
TRIGL SERPL-MCNC: 116 MG/DL (ref 37–140)
VLDLC SERPL CALC-MCNC: 23 MG/DL

## 2023-10-30 PROCEDURE — 80053 COMPREHEN METABOLIC PANEL: CPT

## 2023-10-30 PROCEDURE — 82172 ASSAY OF APOLIPOPROTEIN: CPT

## 2023-10-30 PROCEDURE — 80061 LIPID PANEL: CPT

## 2023-10-30 PROCEDURE — 36415 COLL VENOUS BLD VENIPUNCTURE: CPT

## 2023-11-01 DIAGNOSIS — I73.9 PERIPHERAL VASCULAR DISEASE: Primary | ICD-10-CM

## 2023-11-01 LAB — APO B SERPL-MCNC: 79 MG/DL

## 2023-11-01 RX ORDER — GABAPENTIN 300 MG/1
300 CAPSULE ORAL 2 TIMES DAILY
Qty: 30 CAPSULE | Refills: 3 | Status: SHIPPED | OUTPATIENT
Start: 2023-11-01

## 2023-11-02 ENCOUNTER — EXTERNAL HOME HEALTH (OUTPATIENT)
Dept: HOME HEALTH SERVICES | Facility: HOSPITAL | Age: 68
End: 2023-11-02
Payer: MEDICARE

## 2023-11-03 ENCOUNTER — PATIENT MESSAGE (OUTPATIENT)
Dept: ADMINISTRATIVE | Facility: OTHER | Age: 68
End: 2023-11-03
Payer: MEDICARE

## 2023-11-16 DIAGNOSIS — Z11.4 ENCOUNTER FOR HIV (HUMAN IMMUNODEFICIENCY VIRUS) TEST: Primary | ICD-10-CM

## 2023-11-16 DIAGNOSIS — E11.9 TYPE 2 DIABETES MELLITUS WITHOUT COMPLICATION, WITHOUT LONG-TERM CURRENT USE OF INSULIN: ICD-10-CM

## 2023-11-16 DIAGNOSIS — Z00.00 WELLNESS EXAMINATION: ICD-10-CM

## 2023-11-16 DIAGNOSIS — Z13.6 SCREENING FOR ISCHEMIC HEART DISEASE: ICD-10-CM

## 2023-11-16 DIAGNOSIS — Z11.59 NEED FOR HEPATITIS C SCREENING TEST: ICD-10-CM

## 2023-11-16 DIAGNOSIS — E78.5 DYSLIPIDEMIA: ICD-10-CM

## 2023-11-16 DIAGNOSIS — G62.9 NEUROPATHY: ICD-10-CM

## 2023-11-30 ENCOUNTER — LAB VISIT (OUTPATIENT)
Dept: LAB | Facility: HOSPITAL | Age: 68
End: 2023-11-30
Attending: FAMILY MEDICINE
Payer: MEDICARE

## 2023-11-30 DIAGNOSIS — G62.9 NEUROPATHY: ICD-10-CM

## 2023-11-30 DIAGNOSIS — Z11.4 ENCOUNTER FOR HIV (HUMAN IMMUNODEFICIENCY VIRUS) TEST: ICD-10-CM

## 2023-11-30 DIAGNOSIS — E78.5 DYSLIPIDEMIA: ICD-10-CM

## 2023-11-30 DIAGNOSIS — Z11.59 NEED FOR HEPATITIS C SCREENING TEST: ICD-10-CM

## 2023-11-30 DIAGNOSIS — Z13.6 SCREENING FOR ISCHEMIC HEART DISEASE: ICD-10-CM

## 2023-11-30 DIAGNOSIS — E11.9 TYPE 2 DIABETES MELLITUS WITHOUT COMPLICATION, WITHOUT LONG-TERM CURRENT USE OF INSULIN: ICD-10-CM

## 2023-11-30 DIAGNOSIS — Z00.00 WELLNESS EXAMINATION: ICD-10-CM

## 2023-11-30 LAB
ALBUMIN SERPL-MCNC: 3.4 G/DL (ref 3.4–4.8)
ALBUMIN/GLOB SERPL: 0.9 RATIO (ref 1.1–2)
ALP SERPL-CCNC: 81 UNIT/L (ref 40–150)
ALT SERPL-CCNC: 33 UNIT/L (ref 0–55)
AST SERPL-CCNC: 38 UNIT/L (ref 5–34)
BASOPHILS # BLD AUTO: 0.03 X10(3)/MCL
BASOPHILS NFR BLD AUTO: 0.6 %
BILIRUB SERPL-MCNC: 0.4 MG/DL
BUN SERPL-MCNC: 15 MG/DL (ref 9.8–20.1)
CALCIUM SERPL-MCNC: 9.3 MG/DL (ref 8.4–10.2)
CHLORIDE SERPL-SCNC: 110 MMOL/L (ref 98–107)
CHOLEST SERPL-MCNC: 132 MG/DL
CHOLEST/HDLC SERPL: 2 {RATIO} (ref 0–5)
CO2 SERPL-SCNC: 26 MMOL/L (ref 23–31)
CREAT SERPL-MCNC: 0.73 MG/DL (ref 0.55–1.02)
CREAT UR-MCNC: 138.3 MG/DL (ref 45–106)
EOSINOPHIL # BLD AUTO: 0.19 X10(3)/MCL (ref 0–0.9)
EOSINOPHIL NFR BLD AUTO: 3.8 %
ERYTHROCYTE [DISTWIDTH] IN BLOOD BY AUTOMATED COUNT: 13.1 % (ref 11.5–17)
EST. AVERAGE GLUCOSE BLD GHB EST-MCNC: 182.9 MG/DL
GFR SERPLBLD CREATININE-BSD FMLA CKD-EPI: >60 MLS/MIN/1.73/M2
GLOBULIN SER-MCNC: 3.7 GM/DL (ref 2.4–3.5)
GLUCOSE SERPL-MCNC: 84 MG/DL (ref 82–115)
HBA1C MFR BLD: 8 %
HCT VFR BLD AUTO: 41.7 % (ref 37–47)
HCV AB SERPL QL IA: NONREACTIVE
HDLC SERPL-MCNC: 58 MG/DL (ref 35–60)
HGB BLD-MCNC: 13.3 G/DL (ref 12–16)
HIV 1+2 AB+HIV1 P24 AG SERPL QL IA: NONREACTIVE
IMM GRANULOCYTES # BLD AUTO: 0.01 X10(3)/MCL (ref 0–0.04)
IMM GRANULOCYTES NFR BLD AUTO: 0.2 %
LDLC SERPL CALC-MCNC: 54 MG/DL (ref 50–140)
LYMPHOCYTES # BLD AUTO: 1.77 X10(3)/MCL (ref 0.6–4.6)
LYMPHOCYTES NFR BLD AUTO: 35.5 %
MCH RBC QN AUTO: 30.7 PG (ref 27–31)
MCHC RBC AUTO-ENTMCNC: 31.9 G/DL (ref 33–36)
MCV RBC AUTO: 96.3 FL (ref 80–94)
MICROALBUMIN UR-MCNC: 49.4 UG/ML
MICROALBUMIN/CREAT RATIO PNL UR: 35.7 MG/GM CR (ref 0–30)
MONOCYTES # BLD AUTO: 0.48 X10(3)/MCL (ref 0.1–1.3)
MONOCYTES NFR BLD AUTO: 9.6 %
NEUTROPHILS # BLD AUTO: 2.5 X10(3)/MCL (ref 2.1–9.2)
NEUTROPHILS NFR BLD AUTO: 50.3 %
NRBC BLD AUTO-RTO: 0 %
PLATELET # BLD AUTO: 305 X10(3)/MCL (ref 130–400)
PMV BLD AUTO: 9 FL (ref 7.4–10.4)
POTASSIUM SERPL-SCNC: 4.1 MMOL/L (ref 3.5–5.1)
PROT SERPL-MCNC: 7.1 GM/DL (ref 5.8–7.6)
RBC # BLD AUTO: 4.33 X10(6)/MCL (ref 4.2–5.4)
SODIUM SERPL-SCNC: 147 MMOL/L (ref 136–145)
TRIGL SERPL-MCNC: 101 MG/DL (ref 37–140)
VLDLC SERPL CALC-MCNC: 20 MG/DL
WBC # SPEC AUTO: 4.98 X10(3)/MCL (ref 4.5–11.5)

## 2023-11-30 PROCEDURE — 80053 COMPREHEN METABOLIC PANEL: CPT

## 2023-11-30 PROCEDURE — 83036 HEMOGLOBIN GLYCOSYLATED A1C: CPT

## 2023-11-30 PROCEDURE — 86803 HEPATITIS C AB TEST: CPT

## 2023-11-30 PROCEDURE — 80061 LIPID PANEL: CPT

## 2023-11-30 PROCEDURE — 85025 COMPLETE CBC W/AUTO DIFF WBC: CPT

## 2023-11-30 PROCEDURE — 82043 UR ALBUMIN QUANTITATIVE: CPT

## 2023-11-30 PROCEDURE — 36415 COLL VENOUS BLD VENIPUNCTURE: CPT

## 2023-11-30 PROCEDURE — 87389 HIV-1 AG W/HIV-1&-2 AB AG IA: CPT

## 2023-12-05 ENCOUNTER — OFFICE VISIT (OUTPATIENT)
Dept: FAMILY MEDICINE | Facility: CLINIC | Age: 68
End: 2023-12-05
Payer: MEDICARE

## 2023-12-05 VITALS
HEART RATE: 71 BPM | OXYGEN SATURATION: 97 % | SYSTOLIC BLOOD PRESSURE: 136 MMHG | BODY MASS INDEX: 29.88 KG/M2 | DIASTOLIC BLOOD PRESSURE: 84 MMHG | HEIGHT: 64 IN | WEIGHT: 175 LBS | RESPIRATION RATE: 18 BRPM

## 2023-12-05 DIAGNOSIS — Z00.00 WELLNESS EXAMINATION: Primary | ICD-10-CM

## 2023-12-05 DIAGNOSIS — Z12.31 SCREENING MAMMOGRAM, ENCOUNTER FOR: ICD-10-CM

## 2023-12-05 DIAGNOSIS — M81.0 OSTEOPOROSIS, UNSPECIFIED OSTEOPOROSIS TYPE, UNSPECIFIED PATHOLOGICAL FRACTURE PRESENCE: ICD-10-CM

## 2023-12-05 DIAGNOSIS — Z79.4 TYPE 2 DIABETES MELLITUS WITHOUT COMPLICATION, WITH LONG-TERM CURRENT USE OF INSULIN: ICD-10-CM

## 2023-12-05 DIAGNOSIS — E11.9 TYPE 2 DIABETES MELLITUS WITHOUT COMPLICATION, WITH LONG-TERM CURRENT USE OF INSULIN: Primary | ICD-10-CM

## 2023-12-05 DIAGNOSIS — E11.9 TYPE 2 DIABETES MELLITUS WITHOUT COMPLICATION, WITH LONG-TERM CURRENT USE OF INSULIN: ICD-10-CM

## 2023-12-05 DIAGNOSIS — Z79.4 TYPE 2 DIABETES MELLITUS WITHOUT COMPLICATION, WITH LONG-TERM CURRENT USE OF INSULIN: Primary | ICD-10-CM

## 2023-12-05 LAB — HBA1C MFR BLD: 8.2 %

## 2023-12-05 PROCEDURE — 3075F PR MOST RECENT SYSTOLIC BLOOD PRESS GE 130-139MM HG: ICD-10-PCS | Mod: CPTII,,, | Performed by: FAMILY MEDICINE

## 2023-12-05 PROCEDURE — 3060F PR POS MICROALBUMINURIA RESULT DOCUMENTED/REVIEW: ICD-10-PCS | Mod: CPTII,,, | Performed by: FAMILY MEDICINE

## 2023-12-05 PROCEDURE — 3288F FALL RISK ASSESSMENT DOCD: CPT | Mod: CPTII,,, | Performed by: FAMILY MEDICINE

## 2023-12-05 PROCEDURE — 1159F MED LIST DOCD IN RCRD: CPT | Mod: CPTII,,, | Performed by: FAMILY MEDICINE

## 2023-12-05 PROCEDURE — 1101F PT FALLS ASSESS-DOCD LE1/YR: CPT | Mod: CPTII,,, | Performed by: FAMILY MEDICINE

## 2023-12-05 PROCEDURE — 3079F PR MOST RECENT DIASTOLIC BLOOD PRESSURE 80-89 MM HG: ICD-10-PCS | Mod: CPTII,,, | Performed by: FAMILY MEDICINE

## 2023-12-05 PROCEDURE — 3075F SYST BP GE 130 - 139MM HG: CPT | Mod: CPTII,,, | Performed by: FAMILY MEDICINE

## 2023-12-05 PROCEDURE — 1158F ADVNC CARE PLAN TLK DOCD: CPT | Mod: CPTII,,, | Performed by: FAMILY MEDICINE

## 2023-12-05 PROCEDURE — 3052F PR MOST RECENT HEMOGLOBIN A1C LEVEL 8.0 - < 9.0%: ICD-10-PCS | Mod: CPTII,,, | Performed by: FAMILY MEDICINE

## 2023-12-05 PROCEDURE — 1160F PR REVIEW ALL MEDS BY PRESCRIBER/CLIN PHARMACIST DOCUMENTED: ICD-10-PCS | Mod: CPTII,,, | Performed by: FAMILY MEDICINE

## 2023-12-05 PROCEDURE — 83036 POCT HEMOGLOBIN A1C: ICD-10-PCS | Mod: QW,,, | Performed by: FAMILY MEDICINE

## 2023-12-05 PROCEDURE — 83036 HEMOGLOBIN GLYCOSYLATED A1C: CPT | Mod: QW,,, | Performed by: FAMILY MEDICINE

## 2023-12-05 PROCEDURE — 3066F NEPHROPATHY DOC TX: CPT | Mod: CPTII,,, | Performed by: FAMILY MEDICINE

## 2023-12-05 PROCEDURE — 3288F PR FALLS RISK ASSESSMENT DOCUMENTED: ICD-10-PCS | Mod: CPTII,,, | Performed by: FAMILY MEDICINE

## 2023-12-05 PROCEDURE — 3052F HG A1C>EQUAL 8.0%<EQUAL 9.0%: CPT | Mod: CPTII,,, | Performed by: FAMILY MEDICINE

## 2023-12-05 PROCEDURE — G0439 PPPS, SUBSEQ VISIT: HCPCS | Mod: ,,, | Performed by: FAMILY MEDICINE

## 2023-12-05 PROCEDURE — 3066F PR DOCUMENTATION OF TREATMENT FOR NEPHROPATHY: ICD-10-PCS | Mod: CPTII,,, | Performed by: FAMILY MEDICINE

## 2023-12-05 PROCEDURE — 1159F PR MEDICATION LIST DOCUMENTED IN MEDICAL RECORD: ICD-10-PCS | Mod: CPTII,,, | Performed by: FAMILY MEDICINE

## 2023-12-05 PROCEDURE — 1160F RVW MEDS BY RX/DR IN RCRD: CPT | Mod: CPTII,,, | Performed by: FAMILY MEDICINE

## 2023-12-05 PROCEDURE — 3060F POS MICROALBUMINURIA REV: CPT | Mod: CPTII,,, | Performed by: FAMILY MEDICINE

## 2023-12-05 PROCEDURE — G0439 PR MEDICARE ANNUAL WELLNESS SUBSEQUENT VISIT: ICD-10-PCS | Mod: ,,, | Performed by: FAMILY MEDICINE

## 2023-12-05 PROCEDURE — 1101F PR PT FALLS ASSESS DOC 0-1 FALLS W/OUT INJ PAST YR: ICD-10-PCS | Mod: CPTII,,, | Performed by: FAMILY MEDICINE

## 2023-12-05 PROCEDURE — 1158F PR ADVANCE CARE PLANNING DISCUSS DOCUMENTED IN MEDICAL RECORD: ICD-10-PCS | Mod: CPTII,,, | Performed by: FAMILY MEDICINE

## 2023-12-05 PROCEDURE — 3079F DIAST BP 80-89 MM HG: CPT | Mod: CPTII,,, | Performed by: FAMILY MEDICINE

## 2023-12-05 RX ORDER — ROSUVASTATIN CALCIUM 5 MG/1
5 TABLET, COATED ORAL NIGHTLY
COMMUNITY
End: 2024-03-11 | Stop reason: DRUGHIGH

## 2023-12-05 RX ORDER — FLASH GLUCOSE SENSOR
1 KIT MISCELLANEOUS 3 TIMES DAILY
Qty: 1 KIT | Refills: 0 | Status: SHIPPED | OUTPATIENT
Start: 2023-12-05

## 2023-12-05 RX ORDER — FLASH GLUCOSE SCANNING READER
1 EACH MISCELLANEOUS 3 TIMES DAILY
Qty: 3 EACH | Refills: 3 | Status: SHIPPED | OUTPATIENT
Start: 2023-12-05

## 2023-12-05 NOTE — PROGRESS NOTES
Patient ID: [id number]     Chief Complaint: wellness and GI upset worsens with tylenol      HPI:     Starr Kidd is a 68 y.o. female here today for a Medicare Wellness.       Opioid Screening: Patient medication list reviewed, patient is not taking prescription opioids. Patient is not using additional opioids than prescribed. Patient is not at low risk of substance abuse based on this opioid use history.       ----------------------------  Diabetes mellitus  GERD (gastroesophageal reflux disease)  High cholesterol  History of blood clots  Migraine  Myalgia due to statin     Past Surgical History:   Procedure Laterality Date    APPENDECTOMY      BACK SURGERY      CAROTID ENDARTERECTOMY      CARPAL TUNNEL RELEASE      CHOLECYSTECTOMY      EPIDURAL STEROID INJECTION      HYSTERECTOMY      INJECTION, SACROILIAC JOINT Left 8/12/2022    Procedure: INJECTION,SACROILIAC JOINT / Left SI joint injection;  Surgeon: Cristiano Farley MD;  Location: [facility];  Service: Pain Management;  Laterality: Left;    LEG AMPUTATION THROUGH KNEE      NECK SURGERY      SINUS SURGERY      TUBAL LIGATION         Review of patient's allergies indicates:   Allergen Reactions    Bethanechol Shortness Of Breath    Codeine Shortness Of Breath     Other reaction(s): ANAPHYLAXIS    Gabapentin Swelling    Iodine and iodide containing products Anaphylaxis and Swelling     Other reaction(s): Respiratory Distress    Meperidine Shortness Of Breath     Other reaction(s): Respiratory Distress    Penicillins Anaphylaxis    Phenergan [promethazine] Swelling    Pork derived (porcine) Anaphylaxis and Nausea And Vomiting    Sulfa (sulfonamide antibiotics)      Other reaction(s): anaphylactic  Other reaction(s): Respiratory Distress    Ciprofloxacin      Other reaction(s): UNABLL TO EXPLAIN SIDE EFFECTS    Cymbalta [duloxetine]      Unknown      Dexamethasone Itching     Other reaction(s): Respiratory Distress    Ivp dye [iodinated contrast media]      Other  "reaction(s): anaphylaxis    Latex, natural rubber Itching    Macrobid [nitrofurantoin monohyd/m-cryst] Edema    Propoxyphene n-acetaminophen      Other reaction(s): SOB    Saccharomyces cerevisiae      Other reaction(s): RASH    Egg derived Rash    Morphine Nausea And Vomiting       Outpatient Medications Marked as Taking for the 12/5/23 encounter (Office Visit) with Obey Baker MD   Medication Sig Dispense Refill    ACCU-CHEK GUIDE GLUCOSE METER Mis       ACCU-CHEK GUIDE L1-L2 CTRL SOL Soln       acetaminophen (TYLENOL) 500 MG tablet Tylenol Extra Strength 500 mg tablet   Take 1 tablet every day by oral route in the morning.      BD ALCOHOL SWABS PadM Apply topically.      blood sugar diagnostic (ACCU-CHEK GUIDE TEST STRIPS) Strp Inject 100 strips into the skin Daily. 100 strip 3    blood sugar diagnostic (ACCU-CHEK GUIDE TEST STRIPS) Strp 100 strips by Misc.(Non-Drug; Combo Route) route Daily. 100 strip 3    CHOLESTYRAMINE LIGHT 4 gram packet Take 1 packet by mouth every morning.      clopidogreL (PLAVIX) 75 mg tablet Take 75 mg by mouth.      DROPLET INSULIN SYR,HALF UNIT, 0.5 mL 30 gauge x 1/2" Syrg USE AS DIRECTED  TO INJECT  INSULIN EVERY  each 3    ezetimibe (ZETIA) 10 mg tablet Take 10 mg by mouth.      FARXIGA 10 mg tablet TAKE ONE TABLET BY MOUTH DAILY 30 tablet 4    gabapentin (NEURONTIN) 300 MG capsule Take 1 capsule (300 mg total) by mouth 2 (two) times daily. 30 capsule 3    LANTUS U-100 INSULIN 100 unit/mL injection INJECT 30 UNITS INTO THE SKIN ONCE DAILY. DISCARD VIAL 28 DAYS AFTER OPENING 30 mL 3    levocetirizine (XYZAL) 5 MG tablet TAKE 1 TABLET EVERY EVENING 90 tablet 1    meloxicam (MOBIC) 15 MG tablet Take 1 tablet (15 mg total) by mouth once daily. 30 tablet 3    multivit,stress formula-zinc Tab Take 1 tablet by mouth every morning.      NON FORMULARY MEDICATION Take by mouth.      warfarin (COUMADIN) 7.5 MG tablet TAKE 1 TABLET EVERY DAY 90 tablet 1       Social History "     Socioeconomic History    Marital status: Significant Other   Tobacco Use    Smoking status: Never    Smokeless tobacco: Never   Substance and Sexual Activity    Alcohol use: Not Currently    Drug use: Never    Sexual activity: Yes     Partners: Male     Social Determinants of Health     Financial Resource Strain: Low Risk  (6/29/2023)    Overall Financial Resource Strain (CARDIA)     Difficulty of Paying Living Expenses: Not hard at all   Food Insecurity: No Food Insecurity (6/29/2023)    Hunger Vital Sign     Worried About Running Out of Food in the Last Year: Never true     Ran Out of Food in the Last Year: Never true   Transportation Needs: No Transportation Needs (6/29/2023)    PRAPARE - Transportation     Lack of Transportation (Medical): No     Lack of Transportation (Non-Medical): No   Physical Activity: Inactive (6/29/2023)    Exercise Vital Sign     Days of Exercise per Week: 0 days     Minutes of Exercise per Session: 0 min   Stress: No Stress Concern Present (6/29/2023)    [employer] - Occupational Stress Questionnaire     Feeling of Stress : Not at all   Social Connections: Moderately Integrated (6/29/2023)    Social Connection and Isolation Panel [NHANES]     Frequency of Communication with Friends and Family: More than three times a week     Frequency of Social Gatherings with Friends and Family: More than three times a week     Attends Yazdanism Services: More than 4 times per year     Attends Club or Organization Meetings: Never     Marital Status: Living with partner   Housing Stability: Low Risk  (6/29/2023)    Housing Stability Vital Sign     Unable to Pay for Housing in the Last Year: No     Number of Places Lived in the Last Year: 1     Unstable Housing in the Last Year: No        Family History   Problem Relation Age of Onset    Heart disease Mother     No Known Problems Father         Patient Care Team:  Obey Baker MD as PCP - General (Family  Medicine)  John Cordoba MD as Consulting Physician (Vascular Surgery)  Mickie Kidd MD (Ophthalmology)  Keith Greenberg MD as Consulting Physician (General Surgery)       Subjective:     Review of Systems   Constitutional: Negative.    Respiratory: Negative.     Cardiovascular: Negative.    Gastrointestinal: Negative.    Psychiatric/Behavioral: Negative.           Patient Reported Health Risk Assessment  What [age]  Are you male or female?: Female  During the past four weeks, how much have you been bothered by emotional problems such as feeling anxious, depressed, irritable, sad, or downhearted and blue?: Not at all  During the past five weeks, has your physical and/or emotional health limited your social activities with family, friends, neighbors, or groups?: Not at all  During the past four weeks, how much bodily pain have you generally had?: No pain  During the past four weeks, was someone available to help if you needed and wanted help?: Yes, a little  During the past four weeks, what was the hardest physical activity you could do for at least two minutes?: Light  Can you get to places out of walking distance without help?  (For example, can you travel alone on buses or taxis, or drive your own car?): Yes  Can you go shopping for groceries or clothes without someone's help?: Yes  Can you prepare your own meals?: Yes  Can you do your own housework without help?: Yes  Because of any health problems, do you need the help of another person with your personal care needs such as eating, bathing, dressing, or getting around the house?: No  Can you handle your own money without help?: Yes  During the past four weeks, how would you rate your health in general?: Good  How have things been going for you during the past four weeks?: Good and bad parts about equal  Are you having difficulties driving your car?: No  Do you always fasten your seat belt when you are in a car?: Yes,  "sometimes  How often in the past four weeks have you been bothered by falling or dizzy when standing up?: Never  How often in the past four weeks have you been bothered by sexual problems?: Never  How often in the past four weeks have you been bothered by trouble eating well?: Never  How often in the past four weeks have you been bothered by teeth or denture problems?: Never  How often in the past four weeks have you been bothered with problems using the telephone?: Never  How often in the past four weeks have you been bothered by tiredness or fatigue?: Never  Have you fallen two or more times in the past year?: No  Are you afraid of falling?: No  Are you a smoker?: No  During the past four weeks, how many drinks of wine, beer, or other alcoholic beverages did you have?: No alcohol at all  Do you exercise for about 20 minutes three or more days a week?: No, I usually do not exercise this much  Have you been given any information to help you with hazards in your house that might hurt you?: Yes  Have you been given any information to help you with keeping track of your medications?: Yes  How often do you have trouble taking medicines the way you've been told to take them?: I always take them as prescribed  How confident are you that you can control and manage most of your health problems?: Very confident  What is your race? (Check all that apply.):     Objective:     /84 (BP Location: Left arm, Patient Position: Sitting, BP Method: Large (Manual))   Pulse 71   Resp 18   Ht 5' 4" (1.626 m)   Wt 79.4 kg (175 lb)   SpO2 97%   BMI 30.04 kg/m²     Physical Exam  Constitutional:       Appearance: Normal appearance.   Cardiovascular:      Rate and Rhythm: Normal rate and regular rhythm.   Pulmonary:      Effort: Pulmonary effort is normal.      Breath sounds: Normal breath sounds.   Abdominal:      General: Abdomen is flat. Bowel sounds are normal.      Palpations: Abdomen is soft.   Neurological:      " Mental Status: She is alert.   Psychiatric:         Mood and Affect: Mood normal.         Behavior: Behavior normal.         Thought Content: Thought content normal.         Judgment: Judgment normal.       Recent Results (from the past 504 hour(s))   Comprehensive Metabolic Panel    Collection Time: 11/30/23  9:09 AM   Result Value Ref Range    Sodium Level 147 (H) 136 - 145 mmol/L    Potassium Level 4.1 3.5 - 5.1 mmol/L    Chloride 110 (H) 98 - 107 mmol/L    Carbon Dioxide 26 23 - 31 mmol/L    Glucose Level 84 82 - 115 mg/dL    Blood Urea Nitrogen 15.0 9.8 - 20.1 mg/dL    Creatinine 0.73 0.55 - 1.02 mg/dL    Calcium Level Total 9.3 8.4 - 10.2 mg/dL    Protein Total 7.1 5.8 - 7.6 gm/dL    Albumin Level 3.4 3.4 - 4.8 g/dL    Globulin 3.7 (H) 2.4 - 3.5 gm/dL    Albumin/Globulin Ratio 0.9 (L) 1.1 - 2.0 ratio    Bilirubin Total 0.4 <=1.5 mg/dL    Alkaline Phosphatase 81 40 - 150 unit/L    Alanine Aminotransferase 33 0 - 55 unit/L    Aspartate Aminotransferase 38 (H) 5 - 34 unit/L    eGFR >60 mls/min/1.73/m2   Hepatitis C Antibody    Collection Time: 11/30/23  9:09 AM   Result Value Ref Range    Hep C Ab Interp Nonreactive Nonreactive   HIV 1/2 Ag/Ab (4th Gen)    Collection Time: 11/30/23  9:09 AM   Result Value Ref Range    HIV Nonreactive Nonreactive   Lipid Panel    Collection Time: 11/30/23  9:09 AM   Result Value Ref Range    Cholesterol Total 132 <=200 mg/dL    HDL Cholesterol 58 35 - 60 mg/dL    Triglyceride 101 37 - 140 mg/dL    Cholesterol/HDL Ratio 2 0 - 5    Very Low Density Lipoprotein 20     LDL Cholesterol 54.00 50.00 - 140.00 mg/dL   Hemoglobin A1C    Collection Time: 11/30/23  9:09 AM   Result Value Ref Range    Hemoglobin A1c 8.0 (H) <=7.0 %    Estimated Average Glucose 182.9 mg/dL   Microalbumin/Creatinine Ratio, Urine    Collection Time: 11/30/23  9:09 AM   Result Value Ref Range    Urine Microalbumin 49.4 (H) <=30.0 ug/ml    Urine Creatinine 138.3 (H) 45.0 - 106.0 mg/dL    Microalbumin Creatinine  "Ratio 35.7 (H) 0.0 - 30.0 mg/gm Cr   CBC with Differential    Collection Time: 11/30/23  9:09 AM   Result Value Ref Range    WBC 4.98 4.50 - 11.50 x10(3)/mcL    RBC 4.33 4.20 - 5.40 x10(6)/mcL    Hgb 13.3 12.0 - 16.0 g/dL    Hct 41.7 37.0 - 47.0 %    MCV 96.3 (H) 80.0 - 94.0 fL    MCH 30.7 27.0 - 31.0 pg    MCHC 31.9 (L) 33.0 - 36.0 g/dL    RDW 13.1 11.5 - 17.0 %    Platelet 305 130 - 400 x10(3)/mcL    MPV 9.0 7.4 - 10.4 fL    Neut % 50.3 %    Lymph % 35.5 %    Mono % 9.6 %    Eos % 3.8 %    Basophil % 0.6 %    Lymph # 1.77 0.6 - 4.6 x10(3)/mcL    Neut # 2.50 2.1 - 9.2 x10(3)/mcL    Mono # 0.48 0.1 - 1.3 x10(3)/mcL    Eos # 0.19 0 - 0.9 x10(3)/mcL    Baso # 0.03 <=0.2 x10(3)/mcL    IG# 0.01 0 - 0.04 x10(3)/mcL    IG% 0.2 %    NRBC% 0.0 %   POCT HEMOGLOBIN A1C    Collection Time: 12/05/23  3:24 PM   Result Value Ref Range    Hemoglobin A1C, POC 8.2 %               No data to display                  12/5/2023     3:00 PM 10/5/2023    10:30 AM 9/25/2023    11:00 AM 6/29/2023     1:00 PM 11/29/2022     1:45 PM 7/14/2022     3:45 PM   Fall Risk Assessment - Outpatient   Mobility Status Wheelchair Bound Wheelchair Bound Wheelchair Bound Wheelchair Bound Ambulatory Ambulatory w/ assistance   Number of falls 0 0  0 0 0   Identified as fall risk True True  True False False           Depression Screening  Over the past two weeks, has the patient felt down, depressed, or hopeless?: No  Over the past two weeks, has the patient felt little interest or pleasure in doing things?: No  Functional Ability/Safety Screening  Was the patient's timed Up & Go test unsteady or longer than 30 seconds?: No  Does the patient need help with phone, transportation, shopping, preparing meals, housework, laundry, meds, or managing money?: No  Does the patient's home have rugs in the hallway, lack grab bars in the bathroom, lack handrails on the stairs or have poor lighting?: No  Have you noticed any hearing difficulties?: No  A "Yes" response to " "any of the above questions should trigger further investigation.: n  Cognitive Function (Assessed through direct observation with due consideration of information obtained by way of patient reports and/or concerns raised by family, friends, caretakers, or others)    Does the patient repeat questions/statements in the same day?: No  Does the patient have trouble remembering the date, year, and time?: No  Does the patient have difficulty managing finances?: No  Does the patient have a decreased sense of direction?: No  A "Yes" response to any of the above questions could indicate mild cognitive impairment and should trigger further investigation.: n  Assessment/Plan:     1. Wellness examination  Lab work reviewed with patient  Continue current medication  Continue diet/exercise  Advanced directive discussed with patient  Return to clinic with any concerns    Advance Care Planning     Date: 12/05/2023    Living Will  During this visit, I engaged the patient  in the voluntary advance care planning process.  The patient and I reviewed the role for advance directives and their purpose in directing future healthcare if the patient's unable to speak for him/herself.  At this point in time, the patient does have full decision-making capacity.  We discussed different extreme health states that she could experience, and reviewed what kind of medical care she would want in those situations.  The patient communicated that if she were comatose and had little chance of a meaningful recovery, she would not want machines/life-sustaining treatments used.   I spent a total of 5 minutes engaging the patient in this advance care planning discussion.     2. Type 2 diabetes mellitus without complication, with long-term current use of insulin  -     POCT HEMOGLOBIN A1C  Lab work discussed with patient   Titrate Lantus for better CBGS control   Diet modification  Monitor CBGS  Bring log to next visit     3. Osteoporosis, unspecified " osteoporosis type, unspecified pathological fracture presence  -     CT Bone Density Study 1 + Sites Axial; Future; Expected date: 12/05/2023  Schedule bone density scan     4. Screening mammogram, encounter for  Schedule mammogram         Medicare Annual Wellness and Personalized Prevention Plan:   Fall Risk + Home Safety + Hearing Impairment + Depression Screen + Opioid and Substance Abuse Screening + Cognitive Impairment Screen + Health Risk Assessment all reviewed.     Health Maintenance Topics with due status: Not Due       Topic Last Completion Date    Colorectal Cancer Screening 01/12/2023    Foot Exam 06/29/2023    Diabetes Urine Screening 11/30/2023    Lipid Panel 11/30/2023    Hemoglobin A1c 12/05/2023      The patient's Health Maintenance was reviewed and the following appears to be due at this time:   Health Maintenance Due   Topic Date Due    COVID-19 Vaccine (1) Never done    Pneumococcal Vaccines (Age 65+) (1 - PCV) Never done    TETANUS VACCINE  Never done    High Dose Statin  Never done    Shingles Vaccine (1 of 2) Never done    RSV Vaccine (Age 60+ and Pregnant patients) (1 - 1-dose 60+ series) Never done    Mammogram  11/16/2022    Influenza Vaccine (1) Never done    Eye Exam  09/13/2023       Advance Care Planning   I attest to discussing Advance Care Planning with patient and/or family member.  Education was provided including the importance of the Health Care Power of , Advance Directives, and/or [place] documentation.  The patient expressed understanding to the importance of this information and discussion.         Follow up in about 4 months (around 4/5/2024). In addition to their scheduled follow up, the patient has also been instructed to follow up on as needed basis.

## 2023-12-19 ENCOUNTER — HOSPITAL ENCOUNTER (OUTPATIENT)
Dept: RADIOLOGY | Facility: HOSPITAL | Age: 68
Discharge: HOME | End: 2023-12-19
Attending: FAMILY MEDICINE
Payer: MEDICARE

## 2023-12-19 DIAGNOSIS — R10.9 ABDOMINAL PAIN, UNSPECIFIED ABDOMINAL LOCATION: Primary | ICD-10-CM

## 2023-12-19 DIAGNOSIS — M81.0 OSTEOPOROSIS, UNSPECIFIED OSTEOPOROSIS TYPE, UNSPECIFIED PATHOLOGICAL FRACTURE PRESENCE: ICD-10-CM

## 2023-12-19 PROCEDURE — 77078 CT BONE DENSITY AXIAL: CPT | Mod: TC

## 2023-12-19 RX ORDER — PANTOPRAZOLE SODIUM 40 MG/1
40 TABLET, DELAYED RELEASE ORAL DAILY
Qty: 30 TABLET | Refills: 1 | Status: SHIPPED | OUTPATIENT
Start: 2023-12-19 | End: 2024-01-18

## 2023-12-20 ENCOUNTER — TELEPHONE (OUTPATIENT)
Dept: FAMILY MEDICINE | Facility: CLINIC | Age: 68
End: 2023-12-20
Payer: MEDICARE

## 2023-12-20 NOTE — TELEPHONE ENCOUNTER
----- Message from Obey Baker MD sent at 12/19/2023  4:53 PM CST -----  DEXA Results: Please inform patient of DEXA results, which show OSTEOPENIA. This is a mild thinning of the bone. Encourage Calcium 600mg + Vitamin D 800units BID plus weight bearing exercise at least 3 days per week. Repeat in 2 years.

## 2023-12-29 DIAGNOSIS — T78.40XA ALLERGY, INITIAL ENCOUNTER: ICD-10-CM

## 2023-12-29 DIAGNOSIS — M46.1 SACROILIITIS: ICD-10-CM

## 2024-01-02 RX ORDER — LEVOCETIRIZINE DIHYDROCHLORIDE 5 MG/1
TABLET, FILM COATED ORAL
Qty: 90 TABLET | Refills: 3 | Status: SHIPPED | OUTPATIENT
Start: 2024-01-02

## 2024-01-02 RX ORDER — MELOXICAM 15 MG/1
15 TABLET ORAL
Qty: 90 TABLET | Refills: 3 | Status: SHIPPED | OUTPATIENT
Start: 2024-01-02

## 2024-01-04 DIAGNOSIS — G62.9 NEUROPATHY: Primary | ICD-10-CM

## 2024-01-04 DIAGNOSIS — I25.10 ARTERIOSCLEROSIS OF CORONARY ARTERY: ICD-10-CM

## 2024-01-04 RX ORDER — CHOLESTYRAMINE LIGHT 4 G/5.7G
1 POWDER, FOR SUSPENSION ORAL EVERY MORNING
Qty: 60 PACKET | Refills: 1 | Status: SHIPPED | OUTPATIENT
Start: 2024-01-04

## 2024-01-18 DIAGNOSIS — R10.9 ABDOMINAL PAIN, UNSPECIFIED ABDOMINAL LOCATION: ICD-10-CM

## 2024-01-18 RX ORDER — PANTOPRAZOLE SODIUM 40 MG/1
40 TABLET, DELAYED RELEASE ORAL
Qty: 30 TABLET | Refills: 1 | Status: SHIPPED | OUTPATIENT
Start: 2024-01-18

## 2024-02-15 DIAGNOSIS — Z79.4 TYPE 2 DIABETES MELLITUS WITHOUT COMPLICATION, WITH LONG-TERM CURRENT USE OF INSULIN: ICD-10-CM

## 2024-02-15 DIAGNOSIS — E11.9 TYPE 2 DIABETES MELLITUS WITHOUT COMPLICATION, WITH LONG-TERM CURRENT USE OF INSULIN: ICD-10-CM

## 2024-02-15 RX ORDER — DAPAGLIFLOZIN 10 MG/1
TABLET, FILM COATED ORAL
Qty: 30 TABLET | Refills: 4 | Status: SHIPPED | OUTPATIENT
Start: 2024-02-15 | End: 2024-06-18

## 2024-03-01 ENCOUNTER — LAB VISIT (OUTPATIENT)
Dept: LAB | Facility: HOSPITAL | Age: 69
End: 2024-03-01
Attending: INTERNAL MEDICINE
Payer: MEDICARE

## 2024-03-01 DIAGNOSIS — I10 HYPERTENSION, UNSPECIFIED TYPE: ICD-10-CM

## 2024-03-01 DIAGNOSIS — E78.2 MIXED HYPERLIPIDEMIA: ICD-10-CM

## 2024-03-01 DIAGNOSIS — I25.10 CORONARY ATHEROSCLEROSIS OF NATIVE CORONARY ARTERY: Primary | ICD-10-CM

## 2024-03-01 LAB
ALP SERPL-CCNC: 81 UNIT/L (ref 40–150)
ALT SERPL-CCNC: 39 UNIT/L (ref 0–55)
AST SERPL-CCNC: 36 UNIT/L (ref 5–34)
CHOLEST SERPL-MCNC: 139 MG/DL
CHOLEST/HDLC SERPL: 2 {RATIO} (ref 0–5)
HDLC SERPL-MCNC: 57 MG/DL (ref 35–60)
LDLC SERPL CALC-MCNC: 59 MG/DL (ref 50–140)
TRIGL SERPL-MCNC: 114 MG/DL (ref 37–140)
VLDLC SERPL CALC-MCNC: 23 MG/DL

## 2024-03-01 PROCEDURE — 80061 LIPID PANEL: CPT

## 2024-03-01 PROCEDURE — 36415 COLL VENOUS BLD VENIPUNCTURE: CPT

## 2024-03-01 PROCEDURE — 82172 ASSAY OF APOLIPOPROTEIN: CPT

## 2024-03-01 PROCEDURE — 84075 ASSAY ALKALINE PHOSPHATASE: CPT

## 2024-03-01 PROCEDURE — 84450 TRANSFERASE (AST) (SGOT): CPT

## 2024-03-01 PROCEDURE — 84460 ALANINE AMINO (ALT) (SGPT): CPT

## 2024-03-02 LAB — APO B SERPL-MCNC: 70 MG/DL

## 2024-03-08 ENCOUNTER — PATIENT OUTREACH (OUTPATIENT)
Dept: ADMINISTRATIVE | Facility: HOSPITAL | Age: 69
End: 2024-03-08
Payer: MEDICARE

## 2024-03-08 NOTE — LETTER
Dear Joana [facility] is committed to your overall health. Periodically we review the health information in your chart to make sure you are up to date on all of your recommended tests and/or procedures.       Our review of your chart shows that you may be due for     Health Maintenance Due   Topic    Eye Exam     Hemoglobin A1c        If you have had any of the above done at another facility, please let us know and we will request a copy of the report to update your [facility] record.     Also gentle reminder you are scheduled for a Primary Care F/U with Dr Baker on 4/8/2024 @ 1:00pm.    At your convenience I would like to speak to you to help get these items scheduled (if needed) and also see if there is anything else we can do to help you. Please send me a message via your patient portal or give me a call at [phone].  I am looking forward to speaking with you soon.     Sincerely,      VIDYA Jose Care Coordinator  Obey Baker MD and your [facility] Primary Care Team

## 2024-03-08 NOTE — LETTER
AUTHORIZATION FOR RELEASE OF   CONFIDENTIAL INFORMATION    Dear Dr Nam Kidd,    We are seeing Starr Kidd, date of birth 1955, in the clinic at [facility]. Obey Baker MD is the patient's PCP. Starr Kidd has an outstanding lab/procedure at the time we reviewed her chart. In order to help keep her health information updated, she has authorized us to request the following medical record(s):        (  )  MAMMOGRAM                                      (  )  COLONOSCOPY      (  )  PAP SMEAR                                          (  )  OUTSIDE LAB RESULTS     (  )  DEXA SCAN                                          ( x )  EYE EXAM   most recent         (  )  FOOT EXAM                                          (  )  ENTIRE RECORD     (  )  OUTSIDE IMMUNIZATIONS                 (  )  _______________         Please fax records to [facility], Bergeaux, Scott J, MD, [phone]     If you have any questions, please contact Rebeca at [phone].     Thank you in advance for any help with this matter.      Patient Name: Starr Kidd  : 1955  Patient Phone #: [phone]

## 2024-03-08 NOTE — PROGRESS NOTES
Care Coordination Encounter Details:       MyChart Portal Status:         [x]  Reviewed MyChart Portal Status offered / enrolled if applicable        Additional Notes:     MyChart Outcomes: Pt is enrolled & active  and last log in 1/27/2024 by proxy         Updates Requested / Reviewed:        Updated Care Coordination Note, Care Everywhere, , Care Team Updated, and Other    updated and reviewed         Health Maintenance Screening(s) Due:      Health Maintenance Topics Overdue:      VBHM Score: 2     Eye Exam  Hemoglobin A1c    Influenza Vaccine  Pneumonia Vaccine  Tetanus Vaccine  Shingles/Zoster Vaccine  RSV Vaccine                  Health Maintenance Topic(s) Outreach Outcomes & Actions Taken:    Eye Exam - Outreach Outcomes & Actions Taken  : External Records Requested & Care Team Updated if Applicable and JUAQUIN sent to Dr Nam Kidd    Lab(s) - Outreach Outcomes & Actions Taken  : Patient has Primary Care appt on 4/8/2024 and will get it done at this appt.                                       Additional Notes:             Chronic Disease Management:     Diabetes Measures        Lab Results   Component Value Date    HGBA1C 8.0 (H) 11/30/2023           [x]  Reviewed chart for active Diabetes diagnosis     []  Scheduled necessary follow up appointments if needed         Additional Notes:    Patient has appt with Primary Care Dr  on 4/8/2024 and will get it done in office.         Hypertension Measures        BP Readings from Last 1 Encounters:   12/05/23 136/84           [x]  Reviewed chart for active Hypertension diagnosis     []  Reviewed & documented Home BP Cuff     []  Documented a Remote BP if needed & applicable     []  Scheduled necessary follow up appointments with Primary Care if needed         Additional Notes:             Provider Team Continuity:     Last PCP Visit Date: 12/5/2023          [x]  Reviewed Primary Care Provider Visits, Annual Wellness Visit, and Future           Appointments to ensure appointments have been scheduled and/or           completed        Additional Notes:    Patient has Primary Care appt on 4/8/2024 and PCP wellness on 12/17/2024         Social Determinants of Health          [x]  Reviewed, completed, and/or updated the following sections:                  Food Insecurity, Transportation Needs, Financial Resource Strain,                 Tobacco Use        Additional Notes:    No needs or challenges low risk         Care Management, Digital Medicine, and/or Education Referrals    OPCM Risk Score: 70.2         Next Steps - Referral Actions: Digital Medicine Outcomes and Actions Taken: Patient is not eligible        Additional Notes:      No referrals sent at present

## 2024-03-11 ENCOUNTER — HOSPITAL ENCOUNTER (EMERGENCY)
Facility: HOSPITAL | Age: 69
Discharge: HOME | End: 2024-03-11
Attending: STUDENT IN AN ORGANIZED HEALTH CARE EDUCATION/TRAINING PROGRAM
Payer: MEDICARE

## 2024-03-11 VITALS
WEIGHT: 175 LBS | SYSTOLIC BLOOD PRESSURE: 154 MMHG | HEART RATE: 97 BPM | BODY MASS INDEX: 30.04 KG/M2 | DIASTOLIC BLOOD PRESSURE: 87 MMHG | RESPIRATION RATE: 16 BRPM | OXYGEN SATURATION: 98 % | TEMPERATURE: 99 F

## 2024-03-11 DIAGNOSIS — R04.0 ACUTE ANTERIOR EPISTAXIS: Primary | ICD-10-CM

## 2024-03-11 DIAGNOSIS — R42 CHRONIC VERTIGO: ICD-10-CM

## 2024-03-11 DIAGNOSIS — Z79.4 TYPE 2 DIABETES MELLITUS WITHOUT COMPLICATION, WITH LONG-TERM CURRENT USE OF INSULIN: Primary | ICD-10-CM

## 2024-03-11 DIAGNOSIS — E11.9 TYPE 2 DIABETES MELLITUS WITHOUT COMPLICATION, WITH LONG-TERM CURRENT USE OF INSULIN: Primary | ICD-10-CM

## 2024-03-11 PROCEDURE — 99283 EMERGENCY DEPT VISIT LOW MDM: CPT

## 2024-03-11 PROCEDURE — 25000003 PHARM REV CODE 250: Performed by: STUDENT IN AN ORGANIZED HEALTH CARE EDUCATION/TRAINING PROGRAM

## 2024-03-11 RX ORDER — MECLIZINE HYDROCHLORIDE 25 MG/1
25 TABLET ORAL 3 TIMES DAILY PRN
Qty: 9 TABLET | Refills: 0 | Status: SHIPPED | OUTPATIENT
Start: 2024-03-11

## 2024-03-11 RX ORDER — ROSUVASTATIN CALCIUM 10 MG/1
10 TABLET, COATED ORAL NIGHTLY
COMMUNITY

## 2024-03-11 RX ORDER — OXYMETAZOLINE HCL 0.05 %
1 SPRAY, NON-AEROSOL (ML) NASAL
Status: COMPLETED | OUTPATIENT
Start: 2024-03-11 | End: 2024-03-11

## 2024-03-11 RX ORDER — HYOSCYAMINE SULFATE 0.12 MG/1
0.12 TABLET SUBLINGUAL EVERY 6 HOURS PRN
COMMUNITY
Start: 2024-01-04

## 2024-03-11 RX ADMIN — OXYMETAZOLINE HYDROCHLORIDE 1 SPRAY: 0.05 SPRAY NASAL at 10:03

## 2024-03-12 NOTE — ED PROVIDER NOTES
"Encounter Date: 3/11/2024       History     Chief Complaint   Patient presents with    Epistaxis     C/o nosebleed for 1 hr. Pt states her nose was cauterized by EMT 2 weeks ago bc of the bleeding. It started again tonight and "won't stop". Pt is on Warfarin and Plavix. AAOx4.      Patient is a 68-year-old white female with a past medical history of DVT on chronic warfarin therapy who presented to the ER today due to epistaxis.  She has been suffering with epistaxis from the right nostril for the last 1 week in his already seen ENT and underwent cauterization.  She states that the cauterize tissue seemed to fall out this morning which was followed by rebleeding.  She states that she was able to get it to stop but this evening it started up again and thus presented to the ER.  She denies any PICA symptoms at this time.  She denies any lightheadedness.  She denies any fevers, chills, cough, congestion, chest pain, shortness of breath or abdominal pain.      Review of patient's allergies indicates:   Allergen Reactions    Bethanechol Shortness Of Breath    Codeine Shortness Of Breath     Other reaction(s): ANAPHYLAXIS    Gabapentin Swelling    Iodine and iodide containing products Anaphylaxis and Swelling     Other reaction(s): Respiratory Distress    Meperidine Shortness Of Breath     Other reaction(s): Respiratory Distress    Penicillins Anaphylaxis    Phenergan [promethazine] Swelling    Pork derived (porcine) Anaphylaxis and Nausea And Vomiting    Sulfa (sulfonamide antibiotics)      Other reaction(s): anaphylactic  Other reaction(s): Respiratory Distress    Ciprofloxacin      Other reaction(s): UNABLL TO EXPLAIN SIDE EFFECTS    Cymbalta [duloxetine]      Unknown      Dexamethasone Itching     Other reaction(s): Respiratory Distress    Ivp dye [iodinated contrast media]      Other reaction(s): anaphylaxis    Latex, natural rubber Itching    Macrobid [nitrofurantoin monohyd/m-cryst] Edema    Propoxyphene " n-acetaminophen      Other reaction(s): SOB    Saccharomyces cerevisiae      Other reaction(s): RASH    Egg derived Rash    Morphine Nausea And Vomiting     Past Medical History:   Diagnosis Date    Diabetes mellitus     GERD (gastroesophageal reflux disease)     High cholesterol     History of blood clots     Migraine     Myalgia due to statin      Past Surgical History:   Procedure Laterality Date    APPENDECTOMY      BACK SURGERY      CAROTID ENDARTERECTOMY      CARPAL TUNNEL RELEASE      CHOLECYSTECTOMY      EPIDURAL STEROID INJECTION      HYSTERECTOMY      INJECTION, SACROILIAC JOINT Left 8/12/2022    Procedure: INJECTION,SACROILIAC JOINT / Left SI joint injection;  Surgeon: Cristiano Farley MD;  Location: [facility];  Service: Pain Management;  Laterality: Left;    LEG AMPUTATION THROUGH KNEE      NECK SURGERY      SINUS SURGERY      TUBAL LIGATION       Family History   Problem Relation Age of Onset    Heart disease Mother     No Known Problems Father      Social History     Tobacco Use    Smoking status: Never    Smokeless tobacco: Never   Substance Use Topics    Alcohol use: Not Currently    Drug use: Never     Review of Systems   Constitutional:  Negative for chills, fatigue and fever.   HENT:  Positive for nosebleeds. Negative for congestion, sore throat and trouble swallowing.    Eyes:  Negative for pain and visual disturbance.   Respiratory:  Negative for cough, shortness of breath and wheezing.    Cardiovascular:  Negative for chest pain and palpitations.   Gastrointestinal:  Negative for abdominal pain, blood in stool, constipation, diarrhea, nausea and vomiting.   Genitourinary:  Negative for dysuria and hematuria.   Musculoskeletal:  Negative for back pain and myalgias.   Skin:  Negative for rash and wound.   Neurological:  Negative for seizures, syncope and headaches.   Psychiatric/Behavioral:  Negative for confusion. The patient is not nervous/anxious.        Physical Exam     Initial Vitals [03/11/24  2226]   BP Pulse Resp Temp SpO2   (!) 175/92 103 18 98.2 °F (36.8 °C) 97 %      MAP       --         Physical Exam    Nursing note and vitals reviewed.  Constitutional: She appears well-developed and well-nourished. She is not diaphoretic. No distress.   HENT:   Head: Normocephalic.   Right Ear: External ear normal.   Left Ear: External ear normal.   Nose: Nose normal.   Nose: Left Hunter with no active bleeding and clear nostril.  Right nostril with dried blood and minimal oozing blood from the Hesselbach triangle.   Eyes: Conjunctivae and EOM are normal. Right eye exhibits no discharge. Left eye exhibits no discharge. No scleral icterus.   Neck:   Normal range of motion.  Cardiovascular:  Normal rate, regular rhythm and normal heart sounds.     Exam reveals no gallop and no friction rub.       No murmur heard.  Pulmonary/Chest: Breath sounds normal. No stridor. No respiratory distress. She has no wheezes. She has no rhonchi. She has no rales.   Musculoskeletal:         General: Normal range of motion.      Cervical back: Normal range of motion.     Neurological: She is alert.   Skin: Skin is warm. No rash noted. No erythema.   Psychiatric: She has a normal mood and affect. Her behavior is normal.         ED Course   Procedures  Labs Reviewed - No data to display       Imaging Results    None          Medications   oxymetazoline 0.05 % nasal spray 1 spray (1 spray Each Nostril Given by Provider 3/11/24 2230)     Medical Decision Making  Differentials:  Anterior circulation epistaxis, posterior circulation epistaxis   Historian is the patient and her   Overall well-appearing 68-year-old female presents with mild right-sided epistaxis.  Visual exam consistent with an anterior circulation epistaxis.  Direct pressure failed thus Afrin was applied.  Direct pressure after Afrin was applied stopped all bleeding.  Patient was monitored for an additional 25 minutes no rebleeding started.  Close follow up with her PCP  "and ENT was recommended.  Upon discharge patient states "I need a refill of my meclizine. " patient states she has chronic vertigo although has no symptoms at this time and states that it would be easier to get a refill here and to call her PCP.  Short course of meclizine sent pharmacy.  All questions answered in layman's terms and return precautions were discussed.    Risk  OTC drugs.                                      Clinical Impression:  Final diagnoses:  [R04.0] Acute anterior epistaxis (Primary)  [R42] Chronic vertigo          ED Disposition Condition    Discharge Stable          ED Prescriptions       Medication Sig Dispense Start Date End Date Auth. Provider    meclizine (ANTIVERT) 25 mg tablet Take 1 tablet (25 mg total) by mouth 3 (three) times daily as needed for Dizziness. 9 tablet 3/11/2024 -- Keith Cagle MD          Follow-up Information       Follow up With Specialties Details Why Contact Info    [facility] Emergency [facility]  If symptoms worsen [address] [facility] [id number] [phone]    Obey Baker MD Family Medicine Schedule an appointment as soon as possible for a visit   [address] [facility] [address]  [phone]               Keith Cagle MD  03/11/24 3132    "

## 2024-03-12 NOTE — ED NOTES
DR. RICHARD REASSESSING NOSEBLEED AT THIS TIME. NO BLEEDING NOTED. DR. RICHARD PLANS TO WATCH FOR ANOTHER 15 MIN.

## 2024-03-13 DIAGNOSIS — Z79.4 TYPE 2 DIABETES MELLITUS WITHOUT COMPLICATION, WITH LONG-TERM CURRENT USE OF INSULIN: ICD-10-CM

## 2024-03-13 DIAGNOSIS — E11.9 TYPE 2 DIABETES MELLITUS WITHOUT COMPLICATION, WITH LONG-TERM CURRENT USE OF INSULIN: ICD-10-CM

## 2024-03-13 DIAGNOSIS — I82.90 DEEP VEIN THROMBOSIS (DVT) OF NON-EXTREMITY VEIN, UNSPECIFIED CHRONICITY: ICD-10-CM

## 2024-03-13 RX ORDER — SYRGE-NDL,INS 0.5 ML HALF MARK 30 G X1/2"
SYRINGE, EMPTY DISPOSABLE MISCELLANEOUS
Qty: 100 EACH | Refills: 3 | Status: SHIPPED | OUTPATIENT
Start: 2024-03-13

## 2024-03-13 RX ORDER — WARFARIN 7.5 MG/1
TABLET ORAL
Qty: 90 TABLET | Refills: 3 | Status: SHIPPED | OUTPATIENT
Start: 2024-03-13

## 2024-04-01 ENCOUNTER — LAB VISIT (OUTPATIENT)
Dept: LAB | Facility: HOSPITAL | Age: 69
End: 2024-04-01
Attending: FAMILY MEDICINE
Payer: MEDICARE

## 2024-04-01 DIAGNOSIS — E11.9 TYPE 2 DIABETES MELLITUS WITHOUT COMPLICATION, WITH LONG-TERM CURRENT USE OF INSULIN: ICD-10-CM

## 2024-04-01 DIAGNOSIS — Z79.4 TYPE 2 DIABETES MELLITUS WITHOUT COMPLICATION, WITH LONG-TERM CURRENT USE OF INSULIN: ICD-10-CM

## 2024-04-01 LAB
EST. AVERAGE GLUCOSE BLD GHB EST-MCNC: 203 MG/DL
HBA1C MFR BLD: 8.7 %

## 2024-04-01 PROCEDURE — 83036 HEMOGLOBIN GLYCOSYLATED A1C: CPT

## 2024-04-01 PROCEDURE — 36415 COLL VENOUS BLD VENIPUNCTURE: CPT

## 2024-04-08 ENCOUNTER — OFFICE VISIT (OUTPATIENT)
Dept: FAMILY MEDICINE | Facility: CLINIC | Age: 69
End: 2024-04-08
Payer: MEDICARE

## 2024-04-08 VITALS
SYSTOLIC BLOOD PRESSURE: 134 MMHG | TEMPERATURE: 97 F | DIASTOLIC BLOOD PRESSURE: 78 MMHG | HEIGHT: 64 IN | OXYGEN SATURATION: 97 % | RESPIRATION RATE: 18 BRPM | BODY MASS INDEX: 30.04 KG/M2 | HEART RATE: 79 BPM

## 2024-04-08 DIAGNOSIS — Z79.4 TYPE 2 DIABETES MELLITUS WITH DIABETIC PERIPHERAL ANGIOPATHY WITHOUT GANGRENE, WITH LONG-TERM CURRENT USE OF INSULIN: Primary | ICD-10-CM

## 2024-04-08 DIAGNOSIS — I70.245 CRITICAL LIMB ISCHEMIA OF LEFT LOWER EXTREMITY WITH ULCERATION OF FOOT: ICD-10-CM

## 2024-04-08 DIAGNOSIS — M46.1 SACROILIITIS: ICD-10-CM

## 2024-04-08 DIAGNOSIS — I70.222 ATHEROSCLEROSIS OF NATIVE ARTERIES OF EXTREMITIES WITH REST PAIN, LEFT LEG: ICD-10-CM

## 2024-04-08 DIAGNOSIS — E11.51 TYPE 2 DIABETES MELLITUS WITH DIABETIC PERIPHERAL ANGIOPATHY WITHOUT GANGRENE, WITH LONG-TERM CURRENT USE OF INSULIN: Primary | ICD-10-CM

## 2024-04-08 DIAGNOSIS — D68.9 COAGULATION DEFECT, UNSPECIFIED: ICD-10-CM

## 2024-04-08 PROCEDURE — 3008F BODY MASS INDEX DOCD: CPT | Mod: CPTII,,, | Performed by: FAMILY MEDICINE

## 2024-04-08 PROCEDURE — 1160F RVW MEDS BY RX/DR IN RCRD: CPT | Mod: CPTII,,, | Performed by: FAMILY MEDICINE

## 2024-04-08 PROCEDURE — 99214 OFFICE O/P EST MOD 30 MIN: CPT | Mod: ,,, | Performed by: FAMILY MEDICINE

## 2024-04-08 PROCEDURE — 3075F SYST BP GE 130 - 139MM HG: CPT | Mod: CPTII,,, | Performed by: FAMILY MEDICINE

## 2024-04-08 PROCEDURE — 1101F PT FALLS ASSESS-DOCD LE1/YR: CPT | Mod: CPTII,,, | Performed by: FAMILY MEDICINE

## 2024-04-08 PROCEDURE — 3052F HG A1C>EQUAL 8.0%<EQUAL 9.0%: CPT | Mod: CPTII,,, | Performed by: FAMILY MEDICINE

## 2024-04-08 PROCEDURE — 1159F MED LIST DOCD IN RCRD: CPT | Mod: CPTII,,, | Performed by: FAMILY MEDICINE

## 2024-04-08 PROCEDURE — 3078F DIAST BP <80 MM HG: CPT | Mod: CPTII,,, | Performed by: FAMILY MEDICINE

## 2024-04-08 PROCEDURE — 3288F FALL RISK ASSESSMENT DOCD: CPT | Mod: CPTII,,, | Performed by: FAMILY MEDICINE

## 2024-04-08 RX ORDER — CELECOXIB 200 MG/1
200 CAPSULE ORAL DAILY
Qty: 30 CAPSULE | Refills: 3 | Status: SHIPPED | OUTPATIENT
Start: 2024-04-08 | End: 2024-08-06

## 2024-04-08 NOTE — ASSESSMENT & PLAN NOTE
Recent lab work discussed with patient   Continue current medication   Refer patient to Endocrinology

## 2024-04-08 NOTE — PROGRESS NOTES
"Subjective:       Patient ID: Starr Kidd is a 68 y.o. female.    Chief Complaint: 4 month, migraines (S/p sinus sx /)      Routine      Diabetes  - tolerating medication (no side-effects)  - DXT: 's  - watching diet    Review of Systems   Constitutional: Negative.    Respiratory: Negative.     Cardiovascular: Negative.    Gastrointestinal: Negative.    Psychiatric/Behavioral: Negative.             Objective:      /78 (BP Location: Left arm, Patient Position: Sitting, BP Method: Large (Manual))   Pulse 79   Temp 97.4 °F (36.3 °C)   Resp 18   Ht 5' 4" (1.626 m)   SpO2 97%   BMI 30.04 kg/m²    Physical Exam  Constitutional:       Appearance: Normal appearance.   Cardiovascular:      Rate and Rhythm: Normal rate and regular rhythm.      Heart sounds: Murmur heard.   Pulmonary:      Effort: Pulmonary effort is normal.      Breath sounds: Normal breath sounds.   Abdominal:      General: Abdomen is flat. Bowel sounds are normal.      Palpations: Abdomen is soft.   Musculoskeletal:         General: Normal range of motion.   Neurological:      Mental Status: She is alert.   Psychiatric:         Mood and Affect: Mood normal.         Behavior: Behavior normal.         Thought Content: Thought content normal.         Judgment: Judgment normal.             Recent Results (from the past 504 hour(s))   Hemoglobin A1C    Collection Time: 04/01/24 10:53 AM   Result Value Ref Range    Hemoglobin A1c 8.7 (H) <=7.0 %    Estimated Average Glucose 203.0 mg/dL        Assessment:       Problem List Items Addressed This Visit          Cardiac/Vascular    Critical limb ischemia of left lower extremity with ulceration of foot    Current Assessment & Plan     Followed by Dr. Deleon         Atherosclerosis of native arteries of extremities with rest pain, left leg    Current Assessment & Plan     Followed by Dr. Deleon            Hematology    Coagulation defect, unspecified    Current Assessment & Plan     " Followed by Dr. Snow            Endocrine    Type 2 diabetes mellitus with diabetic peripheral angiopathy without gangrene, with long-term current use of insulin - Primary    Current Assessment & Plan     Recent lab work discussed with patient   Continue current medication   Refer patient to Endocrinology         Relevant Orders    Ambulatory referral/consult to Endocrinology       Orthopedic    Sacroiliitis (Chronic)    Current Assessment & Plan     Stop Mobic   Start Celebrex 200 mg q.day         Relevant Medications    celecoxib (CELEBREX) 200 MG capsule          Plan:   1. Type 2 diabetes mellitus with diabetic peripheral angiopathy without gangrene, with long-term current use of insulin  Assessment & Plan:  Recent lab work discussed with patient   Continue current medication   Refer patient to Endocrinology    Orders:  -     Ambulatory referral/consult to Endocrinology; Future; Expected date: 04/15/2024    2. Atherosclerosis of native arteries of extremities with rest pain, left leg  Assessment & Plan:  Followed by Dr. Deleon    3. Coagulation defect, unspecified  Assessment & Plan:  Followed by Dr. May    4. Critical limb ischemia of left lower extremity with ulceration of foot  Assessment & Plan:  Followed by Dr. Deleon    5. Sacroiliitis  Orders:  -     celecoxib (CELEBREX) 200 MG capsule; Take 1 capsule (200 mg total) by mouth once daily.  Dispense: 30 capsule; Refill: 3    Assessment & Plan:  Stop Mobic   Start Celebrex 200 mg q.day

## 2024-04-19 NOTE — PROGRESS NOTES
Health Maintenance Topic(s) Outreach Outcomes & Actions Taken:    Eye Exam - Outreach Outcomes & Actions Taken  : Patient states she will schedule with her Dr now that Tax Season is over.                                         Additional Notes:           Care Management, Digital Medicine, and/or Education Referrals      Next Steps - Referral Actions: Digital Medicine Outcomes and Actions Taken: not eligible and no referrals sent

## 2024-05-22 DIAGNOSIS — E11.51 TYPE 2 DIABETES MELLITUS WITH DIABETIC PERIPHERAL ANGIOPATHY WITHOUT GANGRENE, WITH LONG-TERM CURRENT USE OF INSULIN: Primary | ICD-10-CM

## 2024-05-22 DIAGNOSIS — Z79.4 TYPE 2 DIABETES MELLITUS WITH DIABETIC PERIPHERAL ANGIOPATHY WITHOUT GANGRENE, WITH LONG-TERM CURRENT USE OF INSULIN: Primary | ICD-10-CM

## 2024-05-31 DIAGNOSIS — E11.51 TYPE 2 DIABETES MELLITUS WITH DIABETIC PERIPHERAL ANGIOPATHY WITHOUT GANGRENE, WITH LONG-TERM CURRENT USE OF INSULIN: Primary | ICD-10-CM

## 2024-05-31 DIAGNOSIS — Z79.4 TYPE 2 DIABETES MELLITUS WITH DIABETIC PERIPHERAL ANGIOPATHY WITHOUT GANGRENE, WITH LONG-TERM CURRENT USE OF INSULIN: Primary | ICD-10-CM

## 2024-06-18 DIAGNOSIS — E11.9 TYPE 2 DIABETES MELLITUS WITHOUT COMPLICATION, WITH LONG-TERM CURRENT USE OF INSULIN: ICD-10-CM

## 2024-06-18 DIAGNOSIS — Z79.4 TYPE 2 DIABETES MELLITUS WITHOUT COMPLICATION, WITH LONG-TERM CURRENT USE OF INSULIN: ICD-10-CM

## 2024-06-18 RX ORDER — DAPAGLIFLOZIN 10 MG/1
10 TABLET, FILM COATED ORAL
Qty: 30 TABLET | Refills: 4 | Status: SHIPPED | OUTPATIENT
Start: 2024-06-18

## 2024-07-18 DIAGNOSIS — M46.1 SACROILIITIS: ICD-10-CM

## 2024-07-18 RX ORDER — CELECOXIB 200 MG/1
CAPSULE ORAL
Qty: 30 CAPSULE | Refills: 3 | Status: SHIPPED | OUTPATIENT
Start: 2024-07-18

## 2024-08-08 ENCOUNTER — OFFICE VISIT (OUTPATIENT)
Dept: FAMILY MEDICINE | Facility: CLINIC | Age: 69
End: 2024-08-08
Payer: MEDICARE

## 2024-08-08 ENCOUNTER — PATIENT OUTREACH (OUTPATIENT)
Facility: CLINIC | Age: 69
End: 2024-08-08
Payer: MEDICARE

## 2024-08-08 VITALS
HEIGHT: 64 IN | TEMPERATURE: 97 F | RESPIRATION RATE: 16 BRPM | WEIGHT: 175 LBS | DIASTOLIC BLOOD PRESSURE: 68 MMHG | HEART RATE: 82 BPM | OXYGEN SATURATION: 95 % | SYSTOLIC BLOOD PRESSURE: 126 MMHG | BODY MASS INDEX: 29.88 KG/M2

## 2024-08-08 DIAGNOSIS — Z12.31 SCREENING MAMMOGRAM, ENCOUNTER FOR: ICD-10-CM

## 2024-08-08 DIAGNOSIS — Z79.4 TYPE 2 DIABETES MELLITUS WITHOUT COMPLICATION, WITH LONG-TERM CURRENT USE OF INSULIN: Primary | ICD-10-CM

## 2024-08-08 DIAGNOSIS — E11.9 TYPE 2 DIABETES MELLITUS WITHOUT COMPLICATION, WITH LONG-TERM CURRENT USE OF INSULIN: Primary | ICD-10-CM

## 2024-08-08 LAB — HBA1C MFR BLD: 10.1 %

## 2024-08-08 PROCEDURE — 99214 OFFICE O/P EST MOD 30 MIN: CPT | Mod: ,,, | Performed by: FAMILY MEDICINE

## 2024-08-08 PROCEDURE — 83036 HEMOGLOBIN GLYCOSYLATED A1C: CPT | Mod: QW,,, | Performed by: FAMILY MEDICINE

## 2024-08-08 PROCEDURE — 3078F DIAST BP <80 MM HG: CPT | Mod: CPTII,,, | Performed by: FAMILY MEDICINE

## 2024-08-08 PROCEDURE — 3008F BODY MASS INDEX DOCD: CPT | Mod: CPTII,,, | Performed by: FAMILY MEDICINE

## 2024-08-08 PROCEDURE — 3046F HEMOGLOBIN A1C LEVEL >9.0%: CPT | Mod: CPTII,,, | Performed by: FAMILY MEDICINE

## 2024-08-08 PROCEDURE — 3074F SYST BP LT 130 MM HG: CPT | Mod: CPTII,,, | Performed by: FAMILY MEDICINE

## 2024-08-08 PROCEDURE — 1160F RVW MEDS BY RX/DR IN RCRD: CPT | Mod: CPTII,,, | Performed by: FAMILY MEDICINE

## 2024-08-08 PROCEDURE — 1159F MED LIST DOCD IN RCRD: CPT | Mod: CPTII,,, | Performed by: FAMILY MEDICINE

## 2024-08-08 PROCEDURE — 1126F AMNT PAIN NOTED NONE PRSNT: CPT | Mod: CPTII,,, | Performed by: FAMILY MEDICINE

## 2024-08-08 RX ORDER — MELOXICAM 15 MG/1
15 TABLET ORAL DAILY
Qty: 30 TABLET | Refills: 3 | Status: SHIPPED | OUTPATIENT
Start: 2024-08-08 | End: 2024-12-06

## 2024-08-08 RX ORDER — DICLOFENAC SODIUM 10 MG/G
2 GEL TOPICAL 4 TIMES DAILY
Qty: 100 G | Refills: 0 | Status: SHIPPED | OUTPATIENT
Start: 2024-08-08

## 2024-08-08 RX ORDER — MELOXICAM 15 MG/1
15 TABLET ORAL DAILY
COMMUNITY
Start: 2024-07-31 | End: 2024-08-08

## 2024-08-28 ENCOUNTER — PATIENT OUTREACH (OUTPATIENT)
Facility: CLINIC | Age: 69
End: 2024-08-28
Payer: MEDICARE

## 2024-08-28 NOTE — PROGRESS NOTES
Health Maintenance Topic(s) Outreach Outcomes & Actions Taken:    Primary Care Appt - Outreach Outcomes & Actions Taken  : Primary Care Appt Scheduled and Patient has scheduled Primary Care f/u on 9/9/2024 and Wellness on 12/17/2024.    Breast Cancer Screening - Outreach Outcomes & Actions Taken  : Mammogram Order Placed, Mammogram Screening Scheduled, and  [facility] patient for second week in November and patient notified . Orders manually faxed and routed electronically and svetlana stated she received them 8/28/2024 that I just resent.                                     Additional Notes:           Care Management, Digital Medicine, and/or Education Referrals      Next Steps - Referral Actions: Digital Medicine Outcomes and Actions Taken: declined and No referrals sent           Quadrant Reporting?: no

## 2024-08-29 ENCOUNTER — PATIENT OUTREACH (OUTPATIENT)
Facility: CLINIC | Age: 69
End: 2024-08-29
Payer: MEDICARE

## 2024-08-29 NOTE — PROGRESS NOTES
Health Maintenance Topic(s) Outreach Outcomes & Actions Taken:    Breast Cancer Screening - Outreach Outcomes & Actions Taken  : Mammogram Screening Scheduled and Patient scheduled [facility]20[facility]     Primary Care Appt - Outreach Outcomes & Actions Taken  : Primary Care Appt Scheduled and Patient has f/u on 9/9/2024 with Primary Care                                       Additional Notes:    Patient thanked me for calling and she was given my Contact# to call with needs or challenges .       Care Management, Digital Medicine, and/or Education Referrals      Next Steps - Referral Actions: Digital Medicine Outcomes and Actions Taken: declined and no referrals sent

## 2024-09-03 DIAGNOSIS — E11.9 TYPE 2 DIABETES MELLITUS WITHOUT COMPLICATION, WITH LONG-TERM CURRENT USE OF INSULIN: ICD-10-CM

## 2024-09-03 DIAGNOSIS — Z79.4 TYPE 2 DIABETES MELLITUS WITH DIABETIC PERIPHERAL ANGIOPATHY WITHOUT GANGRENE, WITH LONG-TERM CURRENT USE OF INSULIN: Primary | ICD-10-CM

## 2024-09-03 DIAGNOSIS — E11.51 TYPE 2 DIABETES MELLITUS WITH DIABETIC PERIPHERAL ANGIOPATHY WITHOUT GANGRENE, WITH LONG-TERM CURRENT USE OF INSULIN: Primary | ICD-10-CM

## 2024-09-03 DIAGNOSIS — Z79.4 TYPE 2 DIABETES MELLITUS WITHOUT COMPLICATION, WITH LONG-TERM CURRENT USE OF INSULIN: ICD-10-CM

## 2024-09-04 ENCOUNTER — LAB VISIT (OUTPATIENT)
Dept: LAB | Facility: HOSPITAL | Age: 69
End: 2024-09-04
Attending: FAMILY MEDICINE
Payer: MEDICARE

## 2024-09-04 DIAGNOSIS — Z79.4 TYPE 2 DIABETES MELLITUS WITHOUT COMPLICATION, WITH LONG-TERM CURRENT USE OF INSULIN: ICD-10-CM

## 2024-09-04 DIAGNOSIS — E11.9 TYPE 2 DIABETES MELLITUS WITHOUT COMPLICATION, WITH LONG-TERM CURRENT USE OF INSULIN: ICD-10-CM

## 2024-09-04 LAB
CREAT UR-MCNC: 97 MG/DL (ref 45–106)
EST. AVERAGE GLUCOSE BLD GHB EST-MCNC: 214.5 MG/DL
HBA1C MFR BLD: 9.1 %
MICROALBUMIN UR-MCNC: 16.1 UG/ML
MICROALBUMIN/CREAT RATIO PNL UR: 16.6 MG/GM CR (ref 0–30)

## 2024-09-04 PROCEDURE — 82570 ASSAY OF URINE CREATININE: CPT

## 2024-09-04 PROCEDURE — 82043 UR ALBUMIN QUANTITATIVE: CPT

## 2024-09-04 PROCEDURE — 83036 HEMOGLOBIN GLYCOSYLATED A1C: CPT

## 2024-09-04 PROCEDURE — 36415 COLL VENOUS BLD VENIPUNCTURE: CPT

## 2024-09-06 ENCOUNTER — LAB VISIT (OUTPATIENT)
Dept: LAB | Facility: HOSPITAL | Age: 69
End: 2024-09-06
Attending: INTERNAL MEDICINE
Payer: MEDICARE

## 2024-09-06 DIAGNOSIS — E11.9 DM TYPE 2 (DIABETES MELLITUS, TYPE 2): ICD-10-CM

## 2024-09-06 DIAGNOSIS — I25.10 CORONARY ARTERY DISEASE, UNSPECIFIED VESSEL OR LESION TYPE, UNSPECIFIED WHETHER ANGINA PRESENT, UNSPECIFIED WHETHER NATIVE OR TRANSPLANTED HEART: Primary | ICD-10-CM

## 2024-09-06 DIAGNOSIS — E78.2 MIXED HYPERLIPIDEMIA: ICD-10-CM

## 2024-09-06 DIAGNOSIS — I10 HYPERTENSION, UNSPECIFIED TYPE: ICD-10-CM

## 2024-09-06 DIAGNOSIS — I73.9 PERIPHERAL VASCULAR DISEASE, UNSPECIFIED: ICD-10-CM

## 2024-09-06 LAB
ALBUMIN SERPL-MCNC: 3.4 G/DL (ref 3.4–4.8)
ALBUMIN/GLOB SERPL: 1 RATIO (ref 1.1–2)
ALP SERPL-CCNC: 81 UNIT/L (ref 40–150)
ALT SERPL-CCNC: 46 UNIT/L (ref 0–55)
ANION GAP SERPL CALC-SCNC: 12 MEQ/L
AST SERPL-CCNC: 42 UNIT/L (ref 5–34)
BILIRUB SERPL-MCNC: 0.6 MG/DL
BUN SERPL-MCNC: 17 MG/DL (ref 9.8–20.1)
CALCIUM SERPL-MCNC: 9.1 MG/DL (ref 8.4–10.2)
CHLORIDE SERPL-SCNC: 109 MMOL/L (ref 98–107)
CHOLEST SERPL-MCNC: 133 MG/DL
CHOLEST/HDLC SERPL: 2 {RATIO} (ref 0–5)
CO2 SERPL-SCNC: 23 MMOL/L (ref 23–31)
CREAT SERPL-MCNC: 0.72 MG/DL (ref 0.55–1.02)
CREAT/UREA NIT SERPL: 24
GFR SERPLBLD CREATININE-BSD FMLA CKD-EPI: >60 ML/MIN/1.73/M2
GLOBULIN SER-MCNC: 3.4 GM/DL (ref 2.4–3.5)
GLUCOSE SERPL-MCNC: 160 MG/DL (ref 82–115)
HDLC SERPL-MCNC: 55 MG/DL (ref 35–60)
LDLC SERPL CALC-MCNC: 53 MG/DL (ref 50–140)
POTASSIUM SERPL-SCNC: 4.6 MMOL/L (ref 3.5–5.1)
PROT SERPL-MCNC: 6.8 GM/DL (ref 5.8–7.6)
SODIUM SERPL-SCNC: 144 MMOL/L (ref 136–145)
TRIGL SERPL-MCNC: 124 MG/DL (ref 37–140)
VLDLC SERPL CALC-MCNC: 25 MG/DL

## 2024-09-06 PROCEDURE — 36415 COLL VENOUS BLD VENIPUNCTURE: CPT

## 2024-09-06 PROCEDURE — 80053 COMPREHEN METABOLIC PANEL: CPT

## 2024-09-06 PROCEDURE — 80061 LIPID PANEL: CPT

## 2024-09-09 ENCOUNTER — OFFICE VISIT (OUTPATIENT)
Dept: FAMILY MEDICINE | Facility: CLINIC | Age: 69
End: 2024-09-09
Payer: MEDICARE

## 2024-09-09 VITALS
TEMPERATURE: 97 F | OXYGEN SATURATION: 96 % | RESPIRATION RATE: 18 BRPM | BODY MASS INDEX: 30.39 KG/M2 | WEIGHT: 178 LBS | HEART RATE: 82 BPM | HEIGHT: 64 IN | DIASTOLIC BLOOD PRESSURE: 66 MMHG | SYSTOLIC BLOOD PRESSURE: 118 MMHG

## 2024-09-09 DIAGNOSIS — Z89.612 S/P AKA (ABOVE KNEE AMPUTATION) BILATERAL: ICD-10-CM

## 2024-09-09 DIAGNOSIS — Z79.4 TYPE 2 DIABETES MELLITUS WITH HYPERGLYCEMIA, WITH LONG-TERM CURRENT USE OF INSULIN: Primary | ICD-10-CM

## 2024-09-09 DIAGNOSIS — E11.65 TYPE 2 DIABETES MELLITUS WITH HYPERGLYCEMIA, WITH LONG-TERM CURRENT USE OF INSULIN: Primary | ICD-10-CM

## 2024-09-09 DIAGNOSIS — J06.9 ACUTE URI: ICD-10-CM

## 2024-09-09 DIAGNOSIS — H61.22 HEARING LOSS OF LEFT EAR DUE TO CERUMEN IMPACTION: ICD-10-CM

## 2024-09-09 DIAGNOSIS — Z89.611 S/P AKA (ABOVE KNEE AMPUTATION) BILATERAL: ICD-10-CM

## 2024-09-09 PROCEDURE — 3066F NEPHROPATHY DOC TX: CPT | Mod: CPTII,,, | Performed by: FAMILY MEDICINE

## 2024-09-09 PROCEDURE — 69210 REMOVE IMPACTED EAR WAX UNI: CPT | Mod: ,,, | Performed by: FAMILY MEDICINE

## 2024-09-09 PROCEDURE — 99214 OFFICE O/P EST MOD 30 MIN: CPT | Mod: 25,,, | Performed by: FAMILY MEDICINE

## 2024-09-09 PROCEDURE — 3078F DIAST BP <80 MM HG: CPT | Mod: CPTII,,, | Performed by: FAMILY MEDICINE

## 2024-09-09 PROCEDURE — 1160F RVW MEDS BY RX/DR IN RCRD: CPT | Mod: CPTII,,, | Performed by: FAMILY MEDICINE

## 2024-09-09 PROCEDURE — 1159F MED LIST DOCD IN RCRD: CPT | Mod: CPTII,,, | Performed by: FAMILY MEDICINE

## 2024-09-09 PROCEDURE — 1126F AMNT PAIN NOTED NONE PRSNT: CPT | Mod: CPTII,,, | Performed by: FAMILY MEDICINE

## 2024-09-09 PROCEDURE — 3046F HEMOGLOBIN A1C LEVEL >9.0%: CPT | Mod: CPTII,,, | Performed by: FAMILY MEDICINE

## 2024-09-09 PROCEDURE — 3008F BODY MASS INDEX DOCD: CPT | Mod: CPTII,,, | Performed by: FAMILY MEDICINE

## 2024-09-09 PROCEDURE — 3074F SYST BP LT 130 MM HG: CPT | Mod: CPTII,,, | Performed by: FAMILY MEDICINE

## 2024-09-09 PROCEDURE — 3061F NEG MICROALBUMINURIA REV: CPT | Mod: CPTII,,, | Performed by: FAMILY MEDICINE

## 2024-09-09 RX ORDER — AZITHROMYCIN 250 MG/1
TABLET, FILM COATED ORAL
Qty: 6 TABLET | Refills: 0 | Status: SHIPPED | OUTPATIENT
Start: 2024-09-09

## 2024-09-09 NOTE — PROGRESS NOTES
"Subjective:      Patient ID: Starr Kidd is a 68 y.o. female.    Chief Complaint: 1 month f/u (Requesting referral to Dr Umaña and [facility])      Follow-up      Diabetes  - tolerating medication (no side-effects)  - DXT: 130-200's  - watching diet    Review of Systems   Constitutional: Negative.  Negative for chills and fever.   HENT:  Positive for nasal congestion, ear pain, rhinorrhea and sore throat. Negative for sinus pressure/congestion.    Respiratory: Negative.  Negative for cough.    Cardiovascular: Negative.    Gastrointestinal: Negative.    Neurological:  Positive for dizziness and weakness (having trouble with ADL's, seen by [facility] in th past).   Psychiatric/Behavioral: Negative.           Objective:     /66   Pulse 82   Temp 97 °F (36.1 °C) (Temporal)   Resp 18   Ht 5' 3.78" (1.62 m)   Wt 80.7 kg (178 lb)   SpO2 96%   BMI 30.76 kg/m²    Physical Exam  Constitutional:       Appearance: Normal appearance.   HENT:      Right Ear: Tympanic membrane and ear canal normal.      Left Ear: There is impacted cerumen.   Cardiovascular:      Rate and Rhythm: Normal rate and regular rhythm.      Heart sounds: Normal heart sounds.   Pulmonary:      Effort: Pulmonary effort is normal.      Breath sounds: Normal breath sounds.   Neurological:      Mental Status: She is alert.   Psychiatric:         Mood and Affect: Mood normal.         Behavior: Behavior normal.         Thought Content: Thought content normal.         Judgment: Judgment normal.        Latest Reference Range & Units 09/04/24 09:23   Hemoglobin A1C External <=7.0 % 9.1 (H)   Estimated Avg Glucose mg/dL 214.5   Urine Creatinine 45.0 - 106.0 mg/dL 97.0   Urine Microalbumin <=30.0 ug/mL 16.1   MICROALB/CREAT RATIO 0.0 - 30.0 mg/gm Cr 16.6   (H): Data is abnormally high      Assessment:     Problem List Items Addressed This Visit          Endocrine    Diabetes mellitus - Primary    Relevant Orders    Ambulatory referral/consult to " Endocrinology       Orthopedic    S/P AKA (above knee amputation) bilateral     Other Visit Diagnoses       Acute URI        Relevant Medications    azithromycin (Z-KEV) 250 MG tablet             Plan:   1. Type 2 diabetes mellitus with hyperglycemia, with long-term current use of insulin  -     Ambulatory referral/consult to Endocrinology; Future; Expected date: 09/09/2024  Improving  Continue current medication/diet modification  Monitor CBG's  Refer patient to Dr Umaña per request  Return to clinic with any concerns    2. S/P AKA (above knee amputation) bilateral  Refer patient for [facility] eval  Return to clinic with any concerns    3. Acute URI  Rx for above medication  OTC medications as needed  Monitor  Return to clinic with concerns     4.  Hearing loss in left ear due to cerumen impaction  Cerumen Impaction left  *Procedure Note*  Posterior traction applied on helix  Bionix lighted ear currette placed in ear canal  Cerumen impaction removed  Ear canal re-examined  Canal normal  Tympanic membrane clear

## 2024-09-10 ENCOUNTER — TELEPHONE (OUTPATIENT)
Dept: FAMILY MEDICINE | Facility: CLINIC | Age: 69
End: 2024-09-10
Payer: MEDICARE

## 2024-09-10 NOTE — TELEPHONE ENCOUNTER
2021       Cheryl Villalba MD  [address] [address]  Via Fax: [phone]      Patient: Jose Francisco Bell   YOB: 1950   Date of Visit: 10/4/2021       Dear Dr. Villalba:    Thank you for referring Jose Francisco Bell to me for evaluation. Below are my notes for this visit with him.    If you have questions, please do not hesitate to call me. I look forward to following your patient along with you.      Sincerely,        Molly Damico MD        CC: No Recipients  Molly Damico MD  10/4/2021  6:13 PM  Signed  OFFICE VISIT      Patient: Jose Francisco Bell Date of Service: 10/4/2021   : 1950 MRN: [id number]     SUBJECTIVE:   HISTORY OF PRESENT ILLNESS:  Jose Francisco Bell is a 70 year old male who presents today for follow up visit. Hx essential hypertension, benign essential hypertension, ankylosing spondylitis of multiple sites in spine, palpitations, anxiety.  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     He recently saw Dr. Mcmahon, from urology regarding his prostate cancer.             PAST MEDICAL HISTORY:  Past Medical History:   Diagnosis Date   • Ankylosing spondylitis of multiple sites in spine (CMS/HCC)    • Anxiety    • Benign essential hypertension    • Coronary artery disease    • Finding of multiple premature atrial contractions by electrocardiography    • Finding of multiple premature atrial contractions by electrocardiography    • Hyperlipidemia    • Palpitations        MEDICATIONS:  Current Outpatient Medications   Medication Sig   • propranolol (INDERAL) 40 MG tablet Take 1 tablet by mouth every 8 hours as needed (palpitations)   • venlafaxine XR (EFFEXOR XR) 150 MG 24 hr capsule Take 150 mg by mouth daily.   • Cholecalciferol (VITAMIN D) 2000 units capsule Take 1 tablet by mouth.   • atorvastatin (LIPITOR) 40 MG tablet Take 40 mg by mouth daily.   • metFORMIN (GLUCOPHAGE) 500 MG tablet Take by mouth daily.   •  received call from [facility] informing us that patient cannot be referred and admitted again for pt due to AKA because she met her goal in her home, they cannot pick her back up, would have to be outpt PT like at [facility], i informed pt and she said she wanted to hold on and will call back if changes her mind    fenofibrate (TRICOR) 145 MG tablet Take by mouth daily.   • mesalamine (APRISO) 0.375 g 24 hr capsule Take by mouth 4 times daily.      No current facility-administered medications for this visit.       ALLERGIES:  ALLERGIES:  No Known Allergies    PAST SURGICAL HISTORY:  Past Surgical History:   Procedure Laterality Date   • Anterior cruciate ligament repair Left 2018    hand (replaced ligament and tendon)   • Bone resection Right 2018    hand   • Knee cartilage surgery Bilateral     right 2017 and left 2018       FAMILY HISTORY:  Family History   Problem Relation Age of Onset   • Hypertension Mother    • Hypertension Father    • Cancer Brother        SOCIAL HISTORY:  Social History     Tobacco Use   • Smoking status: Former Smoker     Packs/day: 0.00     Quit date:      Years since quittin.7   • Smokeless tobacco: Never Used   Substance Use Topics   • Alcohol use: Never   • Drug use: Not Currently       Review of Systems   Constitutional: Negative for decreased appetite, fever, malaise/fatigue, weight gain and weight loss.   HENT: Negative for congestion and hearing loss.    Eyes: Negative for blurred vision.   Cardiovascular: Negative for chest pain, claudication, cyanosis, dyspnea on exertion, irregular heartbeat, leg swelling, near-syncope, orthopnea, palpitations, paroxysmal nocturnal dyspnea and syncope.   Respiratory: Negative for cough, hemoptysis, shortness of breath, sleep disturbances due to breathing, snoring, sputum production and wheezing.    Endocrine: Negative for cold intolerance, heat intolerance, polydipsia, polyphagia and polyuria.   Hematologic/Lymphatic: Negative for adenopathy and bleeding problem. Does not bruise/bleed easily.   Skin: Negative for color change, nail changes and poor wound healing.   Musculoskeletal: Negative for arthritis, back pain, joint pain, muscle cramps, muscle weakness, myalgias and stiffness.       OBJECTIVE:     Vitals:    10/04/21 1010 10/04/21  1011   BP: 122/74 120/74   BP Location: RUE - Right upper extremity RUE - Right upper extremity   Patient Position: Sitting Standing   Pulse: 80    Resp: 16    Temp: 97.9 °F (36.6 °C)    SpO2: 95%    Weight: 76.4 kg (168 lb 6.4 oz)    Height: 5' 10\" (1.778 m)    PainSc:  0        Physical Exam  Constitutional:       Appearance: He is well-developed.   HENT:      Head: Normocephalic.   Eyes:      Pupils: Pupils are equal, round, and reactive to light.   Neck:      Thyroid: No thyromegaly.      Vascular: No JVD.   Cardiovascular:      Rate and Rhythm: Normal rate and regular rhythm.      Chest Wall: PMI is not displaced.      Pulses:           Carotid pulses are 2+ on the right side and 2+ on the left side.       Radial pulses are 2+ on the right side and 2+ on the left side.        Femoral pulses are 2+ on the right side and 2+ on the left side.       Popliteal pulses are 2+ on the right side and 2+ on the left side.        Dorsalis pedis pulses are 2+ on the right side and 2+ on the left side.        Posterior tibial pulses are 2+ on the right side and 2+ on the left side.      Heart sounds: Normal heart sounds. No murmur heard.   No friction rub. No gallop. No S3 or S4 sounds.    Pulmonary:      Effort: Pulmonary effort is normal. No respiratory distress.      Breath sounds: Normal breath sounds. No wheezing or rales.   Chest:      Chest wall: No tenderness.   Abdominal:      General: Bowel sounds are normal. There is no distension.      Palpations: Abdomen is soft.      Tenderness: There is no abdominal tenderness.   Musculoskeletal:         General: Normal range of motion.      Cervical back: Normal range of motion.   Skin:     General: Skin is warm and dry.      Coloration: Skin is not pale.      Findings: No erythema or rash.   Neurological:      Mental Status: He is alert and oriented to person, place, and time.      Cranial Nerves: No cranial nerve deficit.      Coordination: Coordination normal.      Deep  Tendon Reflexes: Reflexes are normal and symmetric.   Psychiatric:         Behavior: Behavior normal.         Thought Content: Thought content normal.         Judgment: Judgment normal.              DIAGNOSTIC STUDIES:   LAB RESULTS:  No results found for: WBC, RBC, HGB, HCT, PLT, NEUT, LYMP, MON, EOS   No results found for: SODIUM, POTASSIUM, CHLORIDE, CO2, BUN, CREATININE, CALCIUM, ALBUMIN, TP, BILIRUBIN, ALKPT, GPT, AST, GLUCOSE, HGBA1C  No results found for: CHOLESTEROL, TRIGLYCERIDE, HDL, CALCLDL   No results found for: TSH, T4FREE    had lipids done 9/7/21 total chol 116, , HDL 40 , LDL 46    A1C 6.2  Assessment AND PLAN:   Diagnoses and all orders for this visit:  Benign essential hypertension  Finding of multiple premature atrial contractions by electrocardiography  Hyperlipidemia, unspecified hyperlipidemia type  Anxiety  Palpitation      This is a 70 year old year-old male who presents with Hx essential hypertension, benign essential hypertension, ankylosing spondylitis of multiple sites in spine, palpitations, anxiety.    Jose Francisco remains asymptomatic from a cardiac standpoint.  His palpitations have been nonexistent with combination of Effexor and propranolol as needed.  Risk factors of hyperlipidemia are being appropriately treated given that he has diabetes.  That is also being appropriately treated and his lipids are reviewed in care everywhere as noted above and look excellent.    His blood pressure is under good control.  He is exercising and offers no complaints of chest discomfort or shortness of breath.    He has mainly been seeing me for reassurance over the years.  He is aware that I will be retiring and he has the name of the physician who will be taking my place.          Return in about 1 year (around 10/4/2022).    Instructions provided as documented in the AVS.    Molly Damico MD

## 2024-09-17 DIAGNOSIS — E11.9 TYPE 2 DIABETES MELLITUS WITHOUT COMPLICATION, WITH LONG-TERM CURRENT USE OF INSULIN: ICD-10-CM

## 2024-09-17 DIAGNOSIS — Z79.4 TYPE 2 DIABETES MELLITUS WITHOUT COMPLICATION, WITH LONG-TERM CURRENT USE OF INSULIN: ICD-10-CM

## 2024-09-17 RX ORDER — DAPAGLIFLOZIN 10 MG/1
10 TABLET, FILM COATED ORAL DAILY
Qty: 90 TABLET | Refills: 1 | Status: SHIPPED | OUTPATIENT
Start: 2024-09-17

## 2024-09-26 ENCOUNTER — PATIENT OUTREACH (OUTPATIENT)
Facility: CLINIC | Age: 69
End: 2024-09-26
Payer: MEDICARE

## 2024-09-26 DIAGNOSIS — R19.7 DIARRHEA, UNSPECIFIED TYPE: Primary | ICD-10-CM

## 2024-10-01 ENCOUNTER — EXTERNAL HOME HEALTH (OUTPATIENT)
Dept: HOME HEALTH SERVICES | Facility: HOSPITAL | Age: 69
End: 2024-10-01
Payer: MEDICARE

## 2024-10-02 ENCOUNTER — LAB VISIT (OUTPATIENT)
Dept: LAB | Facility: HOSPITAL | Age: 69
End: 2024-10-02
Attending: INTERNAL MEDICINE
Payer: MEDICARE

## 2024-10-02 DIAGNOSIS — E11.9 DIABETES MELLITUS WITHOUT COMPLICATION: ICD-10-CM

## 2024-10-02 DIAGNOSIS — I25.10 CORONARY ATHEROSCLEROSIS OF NATIVE CORONARY ARTERY: ICD-10-CM

## 2024-10-02 DIAGNOSIS — I73.9 PERIPHERAL VASCULAR DISEASE, UNSPECIFIED: ICD-10-CM

## 2024-10-02 DIAGNOSIS — E78.2 MIXED HYPERLIPIDEMIA: ICD-10-CM

## 2024-10-02 DIAGNOSIS — I10 ESSENTIAL HYPERTENSION, MALIGNANT: Primary | ICD-10-CM

## 2024-10-02 LAB
ALBUMIN SERPL-MCNC: 3.7 G/DL (ref 3.4–4.8)
ALBUMIN/GLOB SERPL: 1.1 RATIO (ref 1.1–2)
ALP SERPL-CCNC: 86 UNIT/L (ref 40–150)
ALT SERPL-CCNC: 88 UNIT/L (ref 0–55)
ANION GAP SERPL CALC-SCNC: 12 MEQ/L
AST SERPL-CCNC: 66 UNIT/L (ref 5–34)
BILIRUB SERPL-MCNC: 0.8 MG/DL
BUN SERPL-MCNC: 16 MG/DL (ref 9.8–20.1)
CALCIUM SERPL-MCNC: 9.8 MG/DL (ref 8.4–10.2)
CHLORIDE SERPL-SCNC: 106 MMOL/L (ref 98–107)
CHOLEST SERPL-MCNC: 152 MG/DL
CHOLEST/HDLC SERPL: 3 {RATIO} (ref 0–5)
CO2 SERPL-SCNC: 27 MMOL/L (ref 23–31)
CREAT SERPL-MCNC: 0.77 MG/DL (ref 0.55–1.02)
CREAT/UREA NIT SERPL: 21
GFR SERPLBLD CREATININE-BSD FMLA CKD-EPI: >60 ML/MIN/1.73/M2
GLOBULIN SER-MCNC: 3.4 GM/DL (ref 2.4–3.5)
GLUCOSE SERPL-MCNC: 90 MG/DL (ref 82–115)
HDLC SERPL-MCNC: 58 MG/DL (ref 35–60)
LDLC SERPL CALC-MCNC: 68 MG/DL (ref 50–140)
POTASSIUM SERPL-SCNC: 3.9 MMOL/L (ref 3.5–5.1)
PROT SERPL-MCNC: 7.1 GM/DL (ref 5.8–7.6)
SODIUM SERPL-SCNC: 145 MMOL/L (ref 136–145)
TRIGL SERPL-MCNC: 131 MG/DL (ref 37–140)
VLDLC SERPL CALC-MCNC: 26 MG/DL

## 2024-10-02 PROCEDURE — 80053 COMPREHEN METABOLIC PANEL: CPT

## 2024-10-02 PROCEDURE — 80061 LIPID PANEL: CPT

## 2024-10-02 PROCEDURE — 36415 COLL VENOUS BLD VENIPUNCTURE: CPT

## 2024-10-09 DIAGNOSIS — Z79.4 TYPE 2 DIABETES MELLITUS WITHOUT COMPLICATION, WITH LONG-TERM CURRENT USE OF INSULIN: ICD-10-CM

## 2024-10-09 DIAGNOSIS — E11.9 TYPE 2 DIABETES MELLITUS WITHOUT COMPLICATION, WITH LONG-TERM CURRENT USE OF INSULIN: ICD-10-CM

## 2024-10-09 DIAGNOSIS — G62.9 NEUROPATHY: Primary | ICD-10-CM

## 2024-10-09 DIAGNOSIS — T78.40XA ALLERGY, INITIAL ENCOUNTER: ICD-10-CM

## 2024-10-09 RX ORDER — MELOXICAM 15 MG/1
15 TABLET ORAL
Qty: 90 TABLET | Refills: 1 | Status: SHIPPED | OUTPATIENT
Start: 2024-10-09

## 2024-10-09 RX ORDER — LEVOCETIRIZINE DIHYDROCHLORIDE 5 MG/1
TABLET, FILM COATED ORAL
Qty: 90 TABLET | Refills: 3 | Status: SHIPPED | OUTPATIENT
Start: 2024-10-09

## 2024-10-09 RX ORDER — INSULIN GLARGINE 100 [IU]/ML
INJECTION, SOLUTION SUBCUTANEOUS
Qty: 30 ML | Refills: 3 | Status: SHIPPED | OUTPATIENT
Start: 2024-10-09

## 2024-11-07 ENCOUNTER — DOCUMENTATION ONLY (OUTPATIENT)
Dept: FAMILY MEDICINE | Facility: CLINIC | Age: 69
End: 2024-11-07
Payer: MEDICARE

## 2024-11-07 LAB — BCS RECOMMENDATION EXT: NORMAL

## 2024-11-19 ENCOUNTER — OFFICE VISIT (OUTPATIENT)
Dept: FAMILY MEDICINE | Facility: CLINIC | Age: 69
End: 2024-11-19
Payer: MEDICARE

## 2024-11-19 ENCOUNTER — HOSPITAL ENCOUNTER (OUTPATIENT)
Dept: RADIOLOGY | Facility: HOSPITAL | Age: 69
Discharge: HOME | End: 2024-11-19
Attending: FAMILY MEDICINE
Payer: MEDICARE

## 2024-11-19 VITALS
SYSTOLIC BLOOD PRESSURE: 150 MMHG | HEART RATE: 71 BPM | OXYGEN SATURATION: 95 % | RESPIRATION RATE: 18 BRPM | BODY MASS INDEX: 29.88 KG/M2 | HEIGHT: 64 IN | TEMPERATURE: 97 F | DIASTOLIC BLOOD PRESSURE: 70 MMHG | WEIGHT: 175 LBS

## 2024-11-19 DIAGNOSIS — Z79.4 TYPE 2 DIABETES MELLITUS WITH HYPERGLYCEMIA, WITH LONG-TERM CURRENT USE OF INSULIN: Primary | ICD-10-CM

## 2024-11-19 DIAGNOSIS — Z89.512 S/P BILATERAL BKA (BELOW KNEE AMPUTATION): ICD-10-CM

## 2024-11-19 DIAGNOSIS — M54.2 NECK PAIN: ICD-10-CM

## 2024-11-19 DIAGNOSIS — I10 HYPERTENSION, UNSPECIFIED TYPE: ICD-10-CM

## 2024-11-19 DIAGNOSIS — E78.5 HYPERLIPIDEMIA, UNSPECIFIED HYPERLIPIDEMIA TYPE: ICD-10-CM

## 2024-11-19 DIAGNOSIS — R30.0 DYSURIA: ICD-10-CM

## 2024-11-19 DIAGNOSIS — K21.9 GASTROESOPHAGEAL REFLUX DISEASE, UNSPECIFIED WHETHER ESOPHAGITIS PRESENT: ICD-10-CM

## 2024-11-19 DIAGNOSIS — Z00.00 WELLNESS EXAMINATION: ICD-10-CM

## 2024-11-19 DIAGNOSIS — Z89.511 S/P BILATERAL BKA (BELOW KNEE AMPUTATION): ICD-10-CM

## 2024-11-19 DIAGNOSIS — M54.2 NECK PAIN: Primary | ICD-10-CM

## 2024-11-19 DIAGNOSIS — Z11.4 ENCOUNTER FOR HIV (HUMAN IMMUNODEFICIENCY VIRUS) TEST: ICD-10-CM

## 2024-11-19 DIAGNOSIS — Z11.59 NEED FOR HEPATITIS C SCREENING TEST: ICD-10-CM

## 2024-11-19 DIAGNOSIS — E11.65 TYPE 2 DIABETES MELLITUS WITH HYPERGLYCEMIA, WITH LONG-TERM CURRENT USE OF INSULIN: Primary | ICD-10-CM

## 2024-11-19 PROCEDURE — 3046F HEMOGLOBIN A1C LEVEL >9.0%: CPT | Mod: CPTII,,, | Performed by: FAMILY MEDICINE

## 2024-11-19 PROCEDURE — 3078F DIAST BP <80 MM HG: CPT | Mod: CPTII,,, | Performed by: FAMILY MEDICINE

## 2024-11-19 PROCEDURE — 3066F NEPHROPATHY DOC TX: CPT | Mod: CPTII,,, | Performed by: FAMILY MEDICINE

## 2024-11-19 PROCEDURE — 3061F NEG MICROALBUMINURIA REV: CPT | Mod: CPTII,,, | Performed by: FAMILY MEDICINE

## 2024-11-19 PROCEDURE — 1160F RVW MEDS BY RX/DR IN RCRD: CPT | Mod: CPTII,,, | Performed by: FAMILY MEDICINE

## 2024-11-19 PROCEDURE — 72040 X-RAY EXAM NECK SPINE 2-3 VW: CPT | Mod: TC

## 2024-11-19 PROCEDURE — 3008F BODY MASS INDEX DOCD: CPT | Mod: CPTII,,, | Performed by: FAMILY MEDICINE

## 2024-11-19 PROCEDURE — 1159F MED LIST DOCD IN RCRD: CPT | Mod: CPTII,,, | Performed by: FAMILY MEDICINE

## 2024-11-19 PROCEDURE — 99214 OFFICE O/P EST MOD 30 MIN: CPT | Mod: ,,, | Performed by: FAMILY MEDICINE

## 2024-11-19 PROCEDURE — 3077F SYST BP >= 140 MM HG: CPT | Mod: CPTII,,, | Performed by: FAMILY MEDICINE

## 2024-11-19 PROCEDURE — 1125F AMNT PAIN NOTED PAIN PRSNT: CPT | Mod: CPTII,,, | Performed by: FAMILY MEDICINE

## 2024-11-19 RX ORDER — DICLOFENAC SODIUM 10 MG/G
2 GEL TOPICAL 4 TIMES DAILY
Qty: 100 G | Refills: 0 | Status: SHIPPED | OUTPATIENT
Start: 2024-11-19

## 2024-11-19 RX ORDER — PANTOPRAZOLE SODIUM 40 MG/1
40 TABLET, DELAYED RELEASE ORAL DAILY
Qty: 30 TABLET | Refills: 1 | Status: SHIPPED | OUTPATIENT
Start: 2024-11-19 | End: 2025-01-18

## 2024-11-19 NOTE — PROGRESS NOTES
"Subjective:      Patient ID: Starr Kidd is a 69 y.o. female.    Chief Complaint: Neck Pain, Arthritis, Dysuria, and Abdominal Pain      Neck pain    Neck Pain   Pertinent negatives include no fever.   Arthritis  Associated symptoms include dysuria. Pertinent negatives include no fever.   Dysuria   Associated symptoms include frequency. Pertinent negatives include no chills or hematuria.   Abdominal Pain  Associated symptoms include dysuria and frequency. Pertinent negatives include no fever or hematuria.       Review of Systems   Constitutional: Negative.  Negative for chills and fever.   Respiratory: Negative.     Cardiovascular: Negative.    Gastrointestinal:  Positive for abdominal pain and reflux.   Genitourinary:  Positive for dysuria and frequency. Negative for hematuria.   Musculoskeletal:  Positive for arthritis, back pain and neck pain (no recent trauma, headaches, nausea, pain radiates to back).        Bilateral BKA: seen by PT in the past, desires to restart PT         Objective:     BP (!) 150/70   Pulse 71   Temp 97.2 °F (36.2 °C) (Temporal)   Resp 18   Ht 5' 3.78" (1.62 m)   Wt 79.4 kg (175 lb)   SpO2 95%   BMI 30.25 kg/m²    Physical Exam  Constitutional:       Appearance: Normal appearance.   Cardiovascular:      Rate and Rhythm: Normal rate and regular rhythm.      Heart sounds: Murmur heard.   Pulmonary:      Effort: Pulmonary effort is normal.      Breath sounds: Normal breath sounds.   Musculoskeletal:      Comments: Bilateral BKA   Neurological:      Mental Status: She is alert.   Psychiatric:         Mood and Affect: Mood normal.         Behavior: Behavior normal.         Thought Content: Thought content normal.         Judgment: Judgment normal.             Assessment:     Problem List Items Addressed This Visit    None  Visit Diagnoses       Neck pain    -  Primary    Relevant Medications    diclofenac sodium (VOLTAREN ARTHRITIS PAIN) 1 % Gel    Other Relevant Orders    X-Ray " Cervical Spine AP And Lateral    Gastroesophageal reflux disease, unspecified whether esophagitis present        Relevant Medications    pantoprazole (PROTONIX) 40 MG tablet    Dysuria        Relevant Orders    Urinalysis, Reflex to Urine Culture    S/P bilateral BKA (below knee amputation)        Relevant Orders    Ambulatory referral/consult to Physical/Occupational Therapy             Plan:   1. Neck pain  -     diclofenac sodium (VOLTAREN ARTHRITIS PAIN) 1 % Gel; Apply 2 g topically 4 (four) times daily.  Dispense: 100 g; Refill: 0  -     X-Ray Cervical Spine AP And Lateral; Future; Expected date: 11/19/2024  Prior MRI results reviewed  Schedule x-ray C-spine   Rx for Voltaren gel topical b.i.d. x2 weeks   Monitor   Return to clinic with any concerns     2. Gastroesophageal reflux disease, unspecified whether esophagitis present  -     pantoprazole (PROTONIX) 40 MG tablet; Take 1 tablet (40 mg total) by mouth once daily.  Dispense: 30 tablet; Refill: 1  Rx for Protonix 40 mg q.day   Continue Pepcid 40 mg q.p.m.   Diet modification   Reflux precautions   Monitor   Return to clinic with any concerns     3. Dysuria  -     Urinalysis, Reflex to Urine Culture; Future; Expected date: 11/19/2024  Check UA     4. S/p bilateral BKA  Refer patient for outpatient PT eval/tx

## 2024-11-20 ENCOUNTER — TELEPHONE (OUTPATIENT)
Dept: FAMILY MEDICINE | Facility: CLINIC | Age: 69
End: 2024-11-20
Payer: MEDICARE

## 2024-11-20 DIAGNOSIS — M54.2 NECK PAIN: Primary | ICD-10-CM

## 2024-11-20 RX ORDER — TRAMADOL HYDROCHLORIDE 50 MG/1
50 TABLET ORAL EVERY 8 HOURS PRN
Qty: 10 TABLET | Refills: 0 | Status: SHIPPED | OUTPATIENT
Start: 2024-11-20 | End: 2024-11-27

## 2024-11-26 ENCOUNTER — PATIENT OUTREACH (OUTPATIENT)
Facility: CLINIC | Age: 69
End: 2024-11-26
Payer: MEDICARE

## 2024-11-26 VITALS — SYSTOLIC BLOOD PRESSURE: 113 MMHG | DIASTOLIC BLOOD PRESSURE: 60 MMHG

## 2024-11-26 NOTE — LETTER
AUTHORIZATION FOR RELEASE OF   CONFIDENTIAL INFORMATION    Dear [facility],    We are seeing Starr Kidd, date of birth 1955, in the clinic at [facility]. Obey Baker MD is the patient's PCP. Starr Kidd has an outstanding lab/procedure at the time we reviewed her chart. In order to help keep her health information updated, she has authorized us to request the following medical record(s):        (  )  MAMMOGRAM                                      (  )  COLONOSCOPY      (  )  PAP SMEAR                                          (  )  OUTSIDE LAB RESULTS     (  )  DEXA SCAN                                          ( x )  EYE EXAM   recent         (  )  FOOT EXAM                                          (  )  ENTIRE RECORD     (  )  OUTSIDE IMMUNIZATIONS                 (  )  _______________         Please fax records to [facility], Bergeaux, Scott J, MD, [phone]         If you have any questions, please contact Rebeca at [phone].           Patient Name: Starr Kidd  : 1955  Patient Phone #: [phone]

## 2024-11-26 NOTE — PROGRESS NOTES
Care Coordination Encounter Details:       MyChart Portal Status:         [x]  Reviewed MyChart Portal Status offered / enrolled if applicable        Additional Notes:     MyChart Outcomes: Pt is enrolled & active  and last log in 11/18/2024         Updates Requested / Reviewed:        Updated Care Coordination Note, Care Everywhere, , Care Team Updated, Immunizations Reconciliation Completed or Queried: [place], and Other    updated /reviewed         Health Maintenance Screening(s) Due:      Health Maintenance Topics Overdue:      VBHM Score: 1     Eye Exam    Influenza Vaccine  Pneumonia Vaccine  Tetanus Vaccine  Shingles/Zoster Vaccine  RSV Vaccine                  Health Maintenance Topic(s) Outreach Outcomes & Actions Taken:    Eye Exam - Outreach Outcomes & Actions Taken  : External Records Requested & Care Team Updated if Applicable and [facility]  fax# [phone]    Blood Pressure - Outreach Outcomes & Actions Taken  : Remote Blood Pressure Reading Captured and b/p 113/60                                       Additional Notes:             Chronic Disease Management:     Diabetes Measures        Lab Results   Component Value Date    HGBA1C 9.1 (H) 09/04/2024           [x]  Reviewed chart for active Diabetes diagnosis     []  Scheduled necessary follow up appointments if needed         Additional Notes:    Wellness on 11/26/2024-         Hypertension Measures        BP Readings from Last 1 Encounters:   11/26/24 113/60           [x]  Reviewed chart for active Hypertension diagnosis     []  Reviewed & documented Home BP Cuff     [x]  Documented a Remote BP if needed & applicable     []  Scheduled necessary follow up appointments with Primary Care if needed         Additional Notes:             Provider Team Continuity:     Last PCP Visit Date: 11/19/2024          [x]  Reviewed Primary Care Provider Visits, Annual Wellness Visit, and Future          Appointments to ensure  appointments have been scheduled and/or           completed        Additional Notes:  Next Primary Care Wellness 12/17/2024-            Social Determinants of Health          [x]  Reviewed, completed, and/or updated the following sections:                  Food Insecurity, Transportation Needs, Financial Resource Strain,                 Tobacco Use        Additional Notes:             Care Management, Digital Medicine, and/or Education Referrals    OPCM Risk Score: 37.6         Next Steps - Referral Actions: Digital Medicine Outcomes and Actions Taken: Pt Declined or Not Eligible and no referrals sent        Additional Notes:

## 2024-12-12 ENCOUNTER — LAB VISIT (OUTPATIENT)
Dept: LAB | Facility: HOSPITAL | Age: 69
End: 2024-12-12
Attending: FAMILY MEDICINE
Payer: MEDICARE

## 2024-12-12 DIAGNOSIS — E78.5 HYPERLIPIDEMIA, UNSPECIFIED HYPERLIPIDEMIA TYPE: ICD-10-CM

## 2024-12-12 DIAGNOSIS — Z11.59 NEED FOR HEPATITIS C SCREENING TEST: ICD-10-CM

## 2024-12-12 DIAGNOSIS — Z79.4 TYPE 2 DIABETES MELLITUS WITH HYPERGLYCEMIA, WITH LONG-TERM CURRENT USE OF INSULIN: ICD-10-CM

## 2024-12-12 DIAGNOSIS — I10 HYPERTENSION, UNSPECIFIED TYPE: ICD-10-CM

## 2024-12-12 DIAGNOSIS — Z89.612 S/P AKA (ABOVE KNEE AMPUTATION) BILATERAL: Primary | ICD-10-CM

## 2024-12-12 DIAGNOSIS — Z89.611 S/P AKA (ABOVE KNEE AMPUTATION) BILATERAL: Primary | ICD-10-CM

## 2024-12-12 DIAGNOSIS — E11.65 TYPE 2 DIABETES MELLITUS WITH HYPERGLYCEMIA, WITH LONG-TERM CURRENT USE OF INSULIN: ICD-10-CM

## 2024-12-12 DIAGNOSIS — Z11.4 ENCOUNTER FOR HIV (HUMAN IMMUNODEFICIENCY VIRUS) TEST: ICD-10-CM

## 2024-12-12 DIAGNOSIS — Z00.00 WELLNESS EXAMINATION: ICD-10-CM

## 2024-12-12 LAB
ALBUMIN SERPL-MCNC: 3.5 G/DL (ref 3.4–4.8)
ALBUMIN/GLOB SERPL: 1.1 RATIO (ref 1.1–2)
ALP SERPL-CCNC: 91 UNIT/L (ref 40–150)
ALT SERPL-CCNC: 39 UNIT/L (ref 0–55)
ANION GAP SERPL CALC-SCNC: 10 MEQ/L
AST SERPL-CCNC: 28 UNIT/L (ref 5–34)
BASOPHILS # BLD AUTO: 0.05 X10(3)/MCL
BASOPHILS NFR BLD AUTO: 0.7 %
BILIRUB SERPL-MCNC: 0.7 MG/DL
BUN SERPL-MCNC: 16 MG/DL (ref 9.8–20.1)
CALCIUM SERPL-MCNC: 10.2 MG/DL (ref 8.4–10.2)
CHLORIDE SERPL-SCNC: 104 MMOL/L (ref 98–107)
CHOLEST SERPL-MCNC: 153 MG/DL
CHOLEST/HDLC SERPL: 2 {RATIO} (ref 0–5)
CO2 SERPL-SCNC: 29 MMOL/L (ref 23–31)
CREAT SERPL-MCNC: 0.85 MG/DL (ref 0.55–1.02)
CREAT/UREA NIT SERPL: 19
EOSINOPHIL # BLD AUTO: 0.2 X10(3)/MCL (ref 0–0.9)
EOSINOPHIL NFR BLD AUTO: 2.7 %
ERYTHROCYTE [DISTWIDTH] IN BLOOD BY AUTOMATED COUNT: 12.8 % (ref 11.5–17)
EST. AVERAGE GLUCOSE BLD GHB EST-MCNC: 214.5 MG/DL
GFR SERPLBLD CREATININE-BSD FMLA CKD-EPI: >60 ML/MIN/1.73/M2
GLOBULIN SER-MCNC: 3.3 GM/DL (ref 2.4–3.5)
GLUCOSE SERPL-MCNC: 193 MG/DL (ref 82–115)
HBA1C MFR BLD: 9.1 %
HCT VFR BLD AUTO: 43.8 % (ref 37–47)
HCV AB SERPL QL IA: NONREACTIVE
HDLC SERPL-MCNC: 63 MG/DL (ref 35–60)
HGB BLD-MCNC: 14.4 G/DL (ref 12–16)
HIV 1+2 AB+HIV1 P24 AG SERPL QL IA: NONREACTIVE
IMM GRANULOCYTES # BLD AUTO: 0.03 X10(3)/MCL (ref 0–0.04)
IMM GRANULOCYTES NFR BLD AUTO: 0.4 %
LDLC SERPL CALC-MCNC: 63 MG/DL (ref 50–140)
LYMPHOCYTES # BLD AUTO: 1.8 X10(3)/MCL (ref 0.6–4.6)
LYMPHOCYTES NFR BLD AUTO: 24.6 %
MCH RBC QN AUTO: 31.1 PG (ref 27–31)
MCHC RBC AUTO-ENTMCNC: 32.9 G/DL (ref 33–36)
MCV RBC AUTO: 94.6 FL (ref 80–94)
MONOCYTES # BLD AUTO: 0.58 X10(3)/MCL (ref 0.1–1.3)
MONOCYTES NFR BLD AUTO: 7.9 %
NEUTROPHILS # BLD AUTO: 4.67 X10(3)/MCL (ref 2.1–9.2)
NEUTROPHILS NFR BLD AUTO: 63.7 %
NRBC BLD AUTO-RTO: 0 %
PLATELET # BLD AUTO: 260 X10(3)/MCL (ref 130–400)
PMV BLD AUTO: 9.2 FL (ref 7.4–10.4)
POTASSIUM SERPL-SCNC: 4.2 MMOL/L (ref 3.5–5.1)
PROT SERPL-MCNC: 6.8 GM/DL (ref 5.8–7.6)
RBC # BLD AUTO: 4.63 X10(6)/MCL (ref 4.2–5.4)
SODIUM SERPL-SCNC: 143 MMOL/L (ref 136–145)
TRIGL SERPL-MCNC: 135 MG/DL (ref 37–140)
VLDLC SERPL CALC-MCNC: 27 MG/DL
WBC # BLD AUTO: 7.33 X10(3)/MCL (ref 4.5–11.5)

## 2024-12-12 PROCEDURE — 83036 HEMOGLOBIN GLYCOSYLATED A1C: CPT

## 2024-12-12 PROCEDURE — 80053 COMPREHEN METABOLIC PANEL: CPT

## 2024-12-12 PROCEDURE — 80061 LIPID PANEL: CPT

## 2024-12-12 PROCEDURE — 86803 HEPATITIS C AB TEST: CPT

## 2024-12-12 PROCEDURE — 85025 COMPLETE CBC W/AUTO DIFF WBC: CPT

## 2024-12-12 PROCEDURE — 87389 HIV-1 AG W/HIV-1&-2 AB AG IA: CPT

## 2024-12-12 PROCEDURE — 82043 UR ALBUMIN QUANTITATIVE: CPT

## 2024-12-12 PROCEDURE — 36415 COLL VENOUS BLD VENIPUNCTURE: CPT

## 2024-12-13 LAB
CREAT UR-MCNC: 50 MG/DL (ref 45–106)
MICROALBUMIN UR-MCNC: 14.1 UG/ML
MICROALBUMIN/CREAT RATIO PNL UR: 28.2 MG/GM CR (ref 0–30)

## 2024-12-16 DIAGNOSIS — K21.9 GASTROESOPHAGEAL REFLUX DISEASE, UNSPECIFIED WHETHER ESOPHAGITIS PRESENT: ICD-10-CM

## 2024-12-16 RX ORDER — PANTOPRAZOLE SODIUM 40 MG/1
40 TABLET, DELAYED RELEASE ORAL
Qty: 30 TABLET | Refills: 11 | Status: SHIPPED | OUTPATIENT
Start: 2024-12-16

## 2024-12-17 ENCOUNTER — OFFICE VISIT (OUTPATIENT)
Dept: FAMILY MEDICINE | Facility: CLINIC | Age: 69
End: 2024-12-17
Payer: MEDICARE

## 2024-12-17 VITALS
BODY MASS INDEX: 30.25 KG/M2 | TEMPERATURE: 97 F | OXYGEN SATURATION: 97 % | HEIGHT: 64 IN | HEART RATE: 89 BPM | DIASTOLIC BLOOD PRESSURE: 72 MMHG | SYSTOLIC BLOOD PRESSURE: 124 MMHG | RESPIRATION RATE: 16 BRPM

## 2024-12-17 DIAGNOSIS — Z00.00 WELLNESS EXAMINATION: Primary | ICD-10-CM

## 2024-12-17 PROCEDURE — 1158F ADVNC CARE PLAN TLK DOCD: CPT | Mod: CPTII,,, | Performed by: FAMILY MEDICINE

## 2024-12-17 PROCEDURE — 3061F NEG MICROALBUMINURIA REV: CPT | Mod: CPTII,,, | Performed by: FAMILY MEDICINE

## 2024-12-17 PROCEDURE — 3074F SYST BP LT 130 MM HG: CPT | Mod: CPTII,,, | Performed by: FAMILY MEDICINE

## 2024-12-17 PROCEDURE — G0439 PPPS, SUBSEQ VISIT: HCPCS | Mod: ,,, | Performed by: FAMILY MEDICINE

## 2024-12-17 PROCEDURE — 3078F DIAST BP <80 MM HG: CPT | Mod: CPTII,,, | Performed by: FAMILY MEDICINE

## 2024-12-17 PROCEDURE — 1126F AMNT PAIN NOTED NONE PRSNT: CPT | Mod: CPTII,,, | Performed by: FAMILY MEDICINE

## 2024-12-17 PROCEDURE — 1160F RVW MEDS BY RX/DR IN RCRD: CPT | Mod: CPTII,,, | Performed by: FAMILY MEDICINE

## 2024-12-17 PROCEDURE — 3066F NEPHROPATHY DOC TX: CPT | Mod: CPTII,,, | Performed by: FAMILY MEDICINE

## 2024-12-17 PROCEDURE — 2023F DILAT RTA XM W/O RTNOPTHY: CPT | Mod: CPTII,,, | Performed by: FAMILY MEDICINE

## 2024-12-17 PROCEDURE — 1159F MED LIST DOCD IN RCRD: CPT | Mod: CPTII,,, | Performed by: FAMILY MEDICINE

## 2024-12-17 PROCEDURE — 3046F HEMOGLOBIN A1C LEVEL >9.0%: CPT | Mod: CPTII,,, | Performed by: FAMILY MEDICINE

## 2024-12-17 NOTE — ASSESSMENT & PLAN NOTE
Lab work reviewed with patient  Continue current medication  Continue diet/exercise  Advanced directive discussed with patient  Return to clinic with any concerns    Advance Care Planning     Date: 12/17/2024    Living Will  During this visit, I engaged the patient  in the voluntary advance care planning process.  The patient and I reviewed the role for advance directives and their purpose in directing future healthcare if the patient's unable to speak for him/herself.  At this point in time, the patient does have full decision-making capacity.  We discussed different extreme health states that she could experience, and reviewed what kind of medical care she would want in those situations.  The patient communicated that if she were comatose and had little chance of a meaningful recovery, she would not want machines/life-sustaining treatments used. I spent a total of 5 minutes engaging the patient in this advance care planning discussion.

## 2024-12-17 NOTE — PROGRESS NOTES
Patient ID: [id number]     Chief Complaint: Medicare AWV Follow Up      HPI:     Starr Kidd is a 69 y.o. female here today for a Medicare Wellness.       Opioid Screening: Patient medication list reviewed, patient is not taking prescription opioids. Patient is not using additional opioids than prescribed. Patient is not at low risk of substance abuse based on this opioid use history.       -------------------------------------    Diabetes mellitus    GERD (gastroesophageal reflux disease)    High cholesterol    History of blood clots    Migraine    Myalgia due to statin    S/P AKA (above knee amputation) bilateral    Uses prosthesis        Past Surgical History:   Procedure Laterality Date    APPENDECTOMY      BACK SURGERY      BREAST SURGERY      CAROTID ENDARTERECTOMY      CARPAL TUNNEL RELEASE      CHOLECYSTECTOMY      EPIDURAL STEROID INJECTION      EYE SURGERY      HYSTERECTOMY      INJECTION, SACROILIAC JOINT Left 08/12/2022    Dr Cristiano Farley    LEG AMPUTATION THROUGH KNEE Bilateral     NECK SURGERY      SINUS SURGERY      TUBAL LIGATION         Review of patient's allergies indicates:   Allergen Reactions    Bethanechol Shortness Of Breath    Codeine Shortness Of Breath     Other reaction(s): ANAPHYLAXIS    Gabapentin Swelling    Iodine and iodide containing products Anaphylaxis and Swelling     Other reaction(s): Respiratory Distress    Meperidine Shortness Of Breath     Other reaction(s): Respiratory Distress    Penicillins Anaphylaxis    Phenergan [promethazine] Swelling    Pork derived (porcine) Anaphylaxis and Nausea And Vomiting    Pregabalin Anaphylaxis    Sulfa (sulfonamide antibiotics)      Other reaction(s): anaphylactic  Other reaction(s): Respiratory Distress    Ciprofloxacin      Other reaction(s): UNABLL TO EXPLAIN SIDE EFFECTS    Cymbalta [duloxetine]      Unknown      Dexamethasone Itching     Other reaction(s): Respiratory Distress    Estradiol      Other Reaction(s): Not available  "   Ivp dye [iodinated contrast media]      Other reaction(s): anaphylaxis    Latex, natural rubber Itching    Macrobid [nitrofurantoin monohyd/m-cryst] Edema    Propoxyphene n-acetaminophen      Other reaction(s): SOB    Saccharomyces cerevisiae      Other reaction(s): RASH    Egg derived Rash    Morphine Nausea And Vomiting       Outpatient Medications Marked as Taking for the 12/17/24 encounter (Office Visit) with Obey Baker MD   Medication Sig Dispense Refill    ACCU-CHEK GUIDE GLUCOSE METER [facility]       ACCU-CHEK GUIDE L1-L2 CTRL SOL Soln       BD ALCOHOL SWABS PadM Apply topically.      blood sugar diagnostic (ACCU-CHEK GUIDE TEST STRIPS) Strp Inject 100 strips into the skin Daily. 100 strip 3    blood sugar diagnostic (ACCU-CHEK GUIDE TEST STRIPS) Strp 100 strips by Misc.(Non-Drug; Combo Route) route Daily. 100 strip 3    cholestyramine-aspartame (QUESTRAN/PREVALITE LIGHT) 4 gram Powd Take 4 g by mouth Daily. 360 g 3    clopidogreL (PLAVIX) 75 mg tablet Take 75 mg by mouth once daily.      dapagliflozin propanediol (FARXIGA) 10 mg tablet Take 1 tablet (10 mg total) by mouth once daily. 90 tablet 1    diclofenac sodium (VOLTAREN ARTHRITIS PAIN) 1 % Gel Apply 2 g topically 4 (four) times daily. 100 g 0    DROPLET INSULIN SYR,HALF UNIT, 0.5 mL 30 gauge x 1/2" Syrg USE AS DIRECTED TO INJECT INSULIN EVERY  each 3    ezetimibe (ZETIA) 10 mg tablet Take 10 mg by mouth.      famotidine-calcium carbonate-magnesium hydroxide (PEPCID COMPLETE) chewable tablet Take 1 tablet by mouth daily as needed.      flash glucose scanning reader (FREESTYLE CAIO 14 DAY READER) Misc 1 Device by Misc.(Non-Drug; Combo Route) route 3 (three) times daily. 3 each 3    hyoscyamine (LEVSIN) 0.125 mg Subl Take 0.125 mg by mouth every 6 (six) hours as needed.      insulin glargine U-100, Lantus, (LANTUS U-100 INSULIN) 100 unit/mL injection INJECT 30 UNITS INTO THE SKIN ONCE DAILY. (DISCARD VIAL 28 DAYS AFTER OPENING) 30 mL 3    " levocetirizine (XYZAL) 5 MG tablet TAKE 1 TABLET EVERY EVENING 90 tablet 3    meclizine (ANTIVERT) 25 mg tablet Take 1 tablet (25 mg total) by mouth 3 (three) times daily as needed for Dizziness. 9 tablet 0    meloxicam (MOBIC) 15 MG tablet TAKE 1 TABLET EVERY DAY 90 tablet 1    multivit,stress formula-zinc Tab Take 1 tablet by mouth every morning.      NON FORMULARY MEDICATION Take by mouth.      pantoprazole (PROTONIX) 40 MG tablet TAKE ONE TABLET BY MOUTH ONCE DAILY 30 tablet 11    rosuvastatin (CRESTOR) 10 MG tablet Take 10 mg by mouth every evening.      warfarin (COUMADIN) 7.5 MG tablet TAKE 1 TABLET EVERY DAY 90 tablet 3       Social History     Socioeconomic History    Marital status: Significant Other   Tobacco Use    Smoking status: Former     Current packs/day: 0.25     Average packs/day: 0.3 packs/day for 15.0 years (3.8 ttl pk-yrs)     Types: Cigarettes    Smokeless tobacco: Never   Substance and Sexual Activity    Alcohol use: Not Currently    Drug use: Never    Sexual activity: Not Currently     Partners: Male     Social Drivers of Health     Financial Resource Strain: Low Risk  (11/26/2024)    Overall Financial Resource Strain (CARDIA)     Difficulty of Paying Living Expenses: Not hard at all   Food Insecurity: No Food Insecurity (11/26/2024)    Hunger Vital Sign     Worried About Running Out of Food in the Last Year: Never true     Ran Out of Food in the Last Year: Never true   Transportation Needs: No Transportation Needs (11/26/2024)    TRANSPORTATION NEEDS     Transportation : No   Physical Activity: Inactive (9/8/2024)    Exercise Vital Sign     Days of Exercise per Week: 0 days     Minutes of Exercise per Session: 30 min   Stress: No Stress Concern Present (9/8/2024)    [employer] - Occupational Stress Questionnaire     Feeling of Stress : Not at all   Housing Stability: Low Risk  (6/29/2023)    Housing Stability Vital Sign     Unable to Pay for Housing in the Last  "Year: No     Number of Places Lived in the Last Year: 1     Unstable Housing in the Last Year: No        Family History   Problem Relation Name Age of Onset    Heart disease Mother Bev Kidd     Arthritis Mother Bev Kidd     No Known Problems Father      Diabetes Paternal Grandmother Lalito Rivera         Patient Care Team:  Obey Baker MD as PCP - General (Family Medicine)  John Cordoba MD as Consulting Physician (Vascular Surgery)  Nam Kidd MD (Ophthalmology)  Keith Greenberg MD as Consulting Physician (General Surgery)  Donavan Snow MD as Consulting Physician (Cardiology)  [facility] [place] (Home Health Services)  Rebeca Robert LPN as Care Coordinator       Subjective:     Diabetes  - tolerating medication (no side-effects)  - DXT: elevated; reports recently placed on steriods by ENT; scheduled for follow up with Dr Umaña on 12/23/2024  - watching diet    Review of Systems   Constitutional: Negative.    Respiratory: Negative.     Cardiovascular: Negative.    Gastrointestinal: Negative.    Psychiatric/Behavioral: Negative.           Patient Reported Health Risk Assessment       Objective:     /72   Pulse 89   Temp 97.1 °F (36.2 °C) (Temporal)   Resp 16   Ht 5' 3.78" (1.62 m)   SpO2 97%   BMI 30.25 kg/m²     Physical Exam  Constitutional:       Appearance: Normal appearance.   Cardiovascular:      Rate and Rhythm: Normal rate and regular rhythm.   Pulmonary:      Effort: Pulmonary effort is normal.      Breath sounds: Normal breath sounds.   Abdominal:      General: Abdomen is flat. Bowel sounds are normal.      Palpations: Abdomen is soft.   Neurological:      Mental Status: She is alert.   Psychiatric:         Mood and Affect: Mood normal.         Behavior: Behavior normal.         Thought Content: Thought content normal.         Judgment: Judgment normal.       Recent Results (from the past 3 weeks)   Comprehensive Metabolic Panel    Collection Time: " 12/12/24  9:01 AM   Result Value Ref Range    Sodium 143 136 - 145 mmol/L    Potassium 4.2 3.5 - 5.1 mmol/L    Chloride 104 98 - 107 mmol/L    CO2 29 23 - 31 mmol/L    Glucose 193 (H) 82 - 115 mg/dL    Blood Urea Nitrogen 16.0 9.8 - 20.1 mg/dL    Creatinine 0.85 0.55 - 1.02 mg/dL    Calcium 10.2 8.4 - 10.2 mg/dL    Protein Total 6.8 5.8 - 7.6 gm/dL    Albumin 3.5 3.4 - 4.8 g/dL    Globulin 3.3 2.4 - 3.5 gm/dL    Albumin/Globulin Ratio 1.1 1.1 - 2.0 ratio    Bilirubin Total 0.7 <=1.5 mg/dL    ALP 91 40 - 150 unit/L    ALT 39 0 - 55 unit/L    AST 28 5 - 34 unit/L    eGFR >60 mL/min/1.73/m2    Anion Gap 10.0 mEq/L    BUN/Creatinine Ratio 19    Hemoglobin A1C    Collection Time: 12/12/24  9:01 AM   Result Value Ref Range    Hemoglobin A1c 9.1 (H) <=7.0 %    Estimated Average Glucose 214.5 mg/dL   Hepatitis C Antibody    Collection Time: 12/12/24  9:01 AM   Result Value Ref Range    Hep C Ab Interp Nonreactive Nonreactive   HIV 1/2 Ag/Ab (4th Gen)    Collection Time: 12/12/24  9:01 AM   Result Value Ref Range    HIV Nonreactive Nonreactive   Lipid Panel    Collection Time: 12/12/24  9:01 AM   Result Value Ref Range    Cholesterol Total 153 <=200 mg/dL    HDL Cholesterol 63 (H) 35 - 60 mg/dL    Triglyceride 135 37 - 140 mg/dL    Cholesterol/HDL Ratio 2 0 - 5    Very Low Density Lipoprotein 27     LDL Cholesterol 63.00 50.00 - 140.00 mg/dL   CBC with Differential    Collection Time: 12/12/24  9:01 AM   Result Value Ref Range    WBC 7.33 4.50 - 11.50 x10(3)/mcL    RBC 4.63 4.20 - 5.40 x10(6)/mcL    Hgb 14.4 12.0 - 16.0 g/dL    Hct 43.8 37.0 - 47.0 %    MCV 94.6 (H) 80.0 - 94.0 fL    MCH 31.1 (H) 27.0 - 31.0 pg    MCHC 32.9 (L) 33.0 - 36.0 g/dL    RDW 12.8 11.5 - 17.0 %    Platelet 260 130 - 400 x10(3)/mcL    MPV 9.2 7.4 - 10.4 fL    Neut % 63.7 %    Lymph % 24.6 %    Mono % 7.9 %    Eos % 2.7 %    Basophil % 0.7 %    Lymph # 1.80 0.6 - 4.6 x10(3)/mcL    Neut # 4.67 2.1 - 9.2 x10(3)/mcL    Mono # 0.58 0.1 - 1.3 x10(3)/mcL     Eos # 0.20 0 - 0.9 x10(3)/mcL    Baso # 0.05 <=0.2 x10(3)/mcL    IG# 0.03 0 - 0.04 x10(3)/mcL    IG% 0.4 %    NRBC% 0.0 %   Microalbumin/Creatinine Ratio, Urine    Collection Time: 12/12/24  3:51 PM   Result Value Ref Range    Urine Microalbumin 14.1 <=30.0 ug/mL    Urine Creatinine 50.0 45.0 - 106.0 mg/dL    Microalbumin Creatinine Ratio 28.2 0.0 - 30.0 mg/gm Cr               No data to display                  4/8/2024     1:00 PM 12/5/2023     3:00 PM 10/5/2023    10:30 AM 9/25/2023    11:00 AM 6/29/2023     1:00 PM 11/29/2022     1:45 PM 7/14/2022     3:45 PM   Fall Risk Assessment - Outpatient   Mobility Status Wheelchair Bound Wheelchair Bound Wheelchair Bound Wheelchair Bound Wheelchair Bound Ambulatory Ambulatory w/ assistance   Number of falls 0 0 0  0 0 0   Identified as fall risk True True True  True False False              Assessment/Plan:     1. Wellness examination  Assessment & Plan:  Lab work reviewed with patient  Continue current medication  Continue diet/exercise  Advanced directive discussed with patient  Return to clinic with any concerns    Advance Care Planning    Date: 12/17/2024    Living Will  During this visit, I engaged the patient  in the voluntary advance care planning process.  The patient and I reviewed the role for advance directives and their purpose in directing future healthcare if the patient's unable to speak for him/herself.  At this point in time, the patient does have full decision-making capacity.  We discussed different extreme health states that she could experience, and reviewed what kind of medical care she would want in those situations.  The patient communicated that if she were comatose and had little chance of a meaningful recovery, she would not want machines/life-sustaining treatments used. I spent a total of 5 minutes engaging the patient in this advance care planning discussion.                  Medicare Annual Wellness and Personalized Prevention Plan:   Fall  Risk + Home Safety + Hearing Impairment + Depression Screen + Opioid and Substance Abuse Screening + Cognitive Impairment Screen + Health Risk Assessment all reviewed.     Health Maintenance Topics with due status: Not Due       Topic Last Completion Date    Colorectal Cancer Screening 01/12/2023    DEXA Scan 12/19/2023    Eye Exam 05/23/2024    Mammogram 11/06/2024    High Dose Statin 11/19/2024    Diabetes Urine Screening 12/12/2024    Lipid Panel 12/12/2024    Hemoglobin A1c 12/12/2024      The patient's Health Maintenance was reviewed and the following appears to be due at this time:   Health Maintenance Due   Topic Date Due    Pneumococcal Vaccines (Age 65+) (1 of 2 - PCV) Never done    TETANUS VACCINE  Never done    Shingles Vaccine (1 of 2) Never done    RSV Vaccine (Age 60+ and Pregnant patients) (1 - Risk 60-74 years 1-dose series) Never done    Influenza Vaccine (1) Never done    COVID-19 Vaccine (1 - 2024-25 season) Never done       Advance Care Planning   I attest to discussing Advance Care Planning with patient and/or family member.  Education was provided including the importance of the Health Care Power of , Advance Directives, and/or [place] documentation.  The patient expressed understanding to the importance of this information and discussion.         Follow up in about 6 months (around 6/17/2025). In addition to their scheduled follow up, the patient has also been instructed to follow up on as needed basis.

## 2024-12-18 DIAGNOSIS — I82.90 DEEP VEIN THROMBOSIS (DVT) OF NON-EXTREMITY VEIN, UNSPECIFIED CHRONICITY: ICD-10-CM

## 2024-12-18 DIAGNOSIS — Z79.4 TYPE 2 DIABETES MELLITUS WITHOUT COMPLICATION, WITH LONG-TERM CURRENT USE OF INSULIN: ICD-10-CM

## 2024-12-18 DIAGNOSIS — E11.9 TYPE 2 DIABETES MELLITUS WITHOUT COMPLICATION, WITH LONG-TERM CURRENT USE OF INSULIN: ICD-10-CM

## 2024-12-18 RX ORDER — WARFARIN 7.5 MG/1
TABLET ORAL
Qty: 90 TABLET | Refills: 1 | Status: SHIPPED | OUTPATIENT
Start: 2024-12-18

## 2024-12-18 RX ORDER — SYRGE-NDL,INS 0.5 ML HALF MARK 30 G X1/2"
SYRINGE, EMPTY DISPOSABLE MISCELLANEOUS
Qty: 100 EACH | Refills: 3 | Status: SHIPPED | OUTPATIENT
Start: 2024-12-18

## 2024-12-30 ENCOUNTER — OFFICE VISIT (OUTPATIENT)
Dept: FAMILY MEDICINE | Facility: CLINIC | Age: 69
End: 2024-12-30
Payer: MEDICARE

## 2024-12-30 DIAGNOSIS — Z20.828 EXPOSURE TO INFLUENZA: ICD-10-CM

## 2024-12-30 DIAGNOSIS — J06.9 UPPER RESPIRATORY TRACT INFECTION, UNSPECIFIED TYPE: Primary | ICD-10-CM

## 2024-12-30 PROCEDURE — 1159F MED LIST DOCD IN RCRD: CPT | Mod: CPTII,95,,

## 2024-12-30 PROCEDURE — 3066F NEPHROPATHY DOC TX: CPT | Mod: CPTII,95,,

## 2024-12-30 PROCEDURE — 2023F DILAT RTA XM W/O RTNOPTHY: CPT | Mod: CPTII,95,,

## 2024-12-30 PROCEDURE — 1101F PT FALLS ASSESS-DOCD LE1/YR: CPT | Mod: CPTII,95,,

## 2024-12-30 PROCEDURE — 99214 OFFICE O/P EST MOD 30 MIN: CPT | Mod: 95,,,

## 2024-12-30 PROCEDURE — 3061F NEG MICROALBUMINURIA REV: CPT | Mod: CPTII,95,,

## 2024-12-30 PROCEDURE — 1160F RVW MEDS BY RX/DR IN RCRD: CPT | Mod: CPTII,95,,

## 2024-12-30 PROCEDURE — 3046F HEMOGLOBIN A1C LEVEL >9.0%: CPT | Mod: CPTII,95,,

## 2024-12-30 PROCEDURE — 1125F AMNT PAIN NOTED PAIN PRSNT: CPT | Mod: CPTII,95,,

## 2024-12-30 PROCEDURE — 3288F FALL RISK ASSESSMENT DOCD: CPT | Mod: CPTII,95,,

## 2024-12-30 RX ORDER — OSELTAMIVIR PHOSPHATE 75 MG/1
75 CAPSULE ORAL 2 TIMES DAILY
Qty: 10 CAPSULE | Refills: 0 | Status: SHIPPED | OUTPATIENT
Start: 2024-12-30 | End: 2025-01-04

## 2024-12-30 RX ORDER — GUAIFENESIN 600 MG/1
600 TABLET, EXTENDED RELEASE ORAL 2 TIMES DAILY
Qty: 20 TABLET | Refills: 0 | Status: SHIPPED | OUTPATIENT
Start: 2024-12-30 | End: 2025-01-09

## 2024-12-30 NOTE — PROGRESS NOTES
Family Medicine      Patient ID: [id number]     Chief Complaint: Generalized Body Aches (X3 days), Nausea (No vomiting. Patient c/o nausea and upset stomach. X3 days), and Diarrhea (Started having diarrhea this morning.)      HPI:     This is a telemedicine note. Patient was treated using telemedicine, real time audio and video, according to [facility] protocols. ISaba NP, conducted the visit from the [facility]. The patient participated in the visit at a non-[facility] location selected by the patient, identified below. I am licensed in the state where the patient stated they are located. The patient stated that they understood and accepted the privacy and security risks to their information at their location. This visit is not recorded.    Patient was located at the patient's home.      274}    Starr Kidd is a 69 y.o. female here today for a telemedicine visit. Complaining of chills, headache, myalgias, clear nasal discharge, night sweats, nausea, diarrhea, post nasal drip, sinus and nasal congestion, and sore throat  x 2 days.  Denies known fever, SOB, difficulty breathing, wheezing, or CP. Admits to family members with similar symptoms; notably daughter she was with for [place] was recently diagnosed with the flu, patient requesting Rx for Tamiflu due to recent exposure and current symptoms. No other complaints today.     Past Medical History:   Diagnosis Date    Diabetes mellitus     GERD (gastroesophageal reflux disease)     High cholesterol     History of blood clots     Migraine     Myalgia due to statin     S/P AKA (above knee amputation) bilateral 10/05/2023    Uses prosthesis         Past Surgical History:   Procedure Laterality Date    APPENDECTOMY      BACK SURGERY      BREAST SURGERY      CAROTID ENDARTERECTOMY      CARPAL TUNNEL RELEASE      CHOLECYSTECTOMY      EPIDURAL STEROID INJECTION      EYE SURGERY      HYSTERECTOMY      INJECTION, SACROILIAC JOINT Left 08/12/2022  "   Dr Cristiano Farley    LEG AMPUTATION THROUGH KNEE Bilateral     NECK SURGERY      SINUS SURGERY      TUBAL LIGATION          Social History     Tobacco Use    Smoking status: Former     Current packs/day: 0.25     Average packs/day: 0.3 packs/day for 15.0 years (3.8 ttl pk-yrs)     Types: Cigarettes    Smokeless tobacco: Never   Substance and Sexual Activity    Alcohol use: Not Currently    Drug use: Never    Sexual activity: Not Currently     Partners: Male        Current Outpatient Medications   Medication Instructions    ACCU-CHEK GUIDE GLUCOSE METER Misc No dose, route, or frequency recorded.    ACCU-CHEK GUIDE L1-L2 CTRL SOL Soln No dose, route, or frequency recorded.    BD ALCOHOL SWABS PadM Apply topically.    blood sugar diagnostic (ACCU-CHEK GUIDE TEST STRIPS) Strp 100 strips, Subcutaneous, Daily    blood sugar diagnostic (ACCU-CHEK GUIDE TEST STRIPS) Strp 100 strips, Misc.(Non-Drug; Combo Route), Daily    cholestyramine-aspartame (QUESTRAN/PREVALITE LIGHT) 4 gram Powd 4 g, Oral, Daily    clopidogreL (PLAVIX) 75 mg, Daily    dapagliflozin propanediol (FARXIGA) 10 mg, Oral, Daily    DROPLET INSULIN SYR,HALF UNIT, 0.5 mL 30 gauge x 1/2" Syrg USE AS DIRECTED TO INJECT INSULIN EVERY DAY    ezetimibe (ZETIA) 10 mg    flash glucose scanning reader (FREESTYLE CAIO 14 DAY READER) Misc 1 Device, Misc.(Non-Drug; Combo Route), 3 times daily    guaiFENesin (MUCINEX) 600 mg, Oral, 2 times daily    hyoscyamine 0.125 mg, Every 6 hours PRN    insulin glargine U-100, Lantus, (LANTUS U-100 INSULIN) 100 unit/mL injection INJECT 30 UNITS INTO THE SKIN ONCE DAILY. (DISCARD VIAL 28 DAYS AFTER OPENING)    levocetirizine (XYZAL) 5 MG tablet TAKE 1 TABLET EVERY EVENING    meclizine (ANTIVERT) 25 mg, Oral, 3 times daily PRN    multivit,stress formula-zinc Tab 1 tablet, Every morning    NON FORMULARY MEDICATION Take by mouth.    oseltamivir (TAMIFLU) 75 mg, Oral, 2 times daily    pantoprazole (PROTONIX) 40 mg, Oral    rosuvastatin " (CRESTOR) 10 mg, Nightly    warfarin (COUMADIN) 7.5 MG tablet TAKE 1 TABLET EVERY DAY       Review of patient's allergies indicates:   Allergen Reactions    Bethanechol Shortness Of Breath    Codeine Shortness Of Breath     Other reaction(s): ANAPHYLAXIS    Gabapentin Swelling    Iodine and iodide containing products Anaphylaxis and Swelling     Other reaction(s): Respiratory Distress    Meperidine Shortness Of Breath     Other reaction(s): Respiratory Distress    Penicillins Anaphylaxis    Phenergan [promethazine] Swelling    Pork derived (porcine) Anaphylaxis and Nausea And Vomiting    Pregabalin Anaphylaxis    Sulfa (sulfonamide antibiotics)      Other reaction(s): anaphylactic  Other reaction(s): Respiratory Distress    Ciprofloxacin      Other reaction(s): UNABLL TO EXPLAIN SIDE EFFECTS    Cymbalta [duloxetine]      Unknown      Dexamethasone Itching     Other reaction(s): Respiratory Distress    Estradiol      Other Reaction(s): Not available    Ivp dye [iodinated contrast media]      Other reaction(s): anaphylaxis    Latex, natural rubber Itching    Macrobid [nitrofurantoin monohyd/m-cryst] Edema    Propoxyphene n-acetaminophen      Other reaction(s): SOB    Saccharomyces cerevisiae      Other reaction(s): RASH    Egg derived Rash    Morphine Nausea And Vomiting        Patient Care Team:  Obey Baker MD as PCP - General (Family Medicine)  John Cordoba MD as Consulting Physician (Vascular Surgery)  Nam Kidd MD (Ophthalmology)  Keith Greenberg MD as Consulting Physician (General Surgery)  Donavan Snow MD as Consulting Physician (Cardiology)  [facility] ([facility] Health Services)  Rebeca Robert LPN as Care Coordinator     Subjective:     Review of Systems    12 point review of systems conducted, negative except as stated in the history of present illness. See HPI for details.    Objective:     There were no vitals taken for this visit.    Physical Exam    Physical Exam: LIMITED  DUE TO TELEMEDICINE RESTRICTIONS.  General: Alert and oriented, No acute distress.  Head: Normocephalic.  Eye: Sclera non-icteric.  Neck/Thyroid:  Full range of motion.  Respiratory: Non-labored respirations, Symmetrical chest wall expansion.  Musculoskeletal: Normal range of motion.  Integumentary:  No visible suspicious lesions or rashes. No diaphoresis.   Neurologic: No focal deficits  Psychiatric: Normal interaction, Coherent speech, Euthymic mood, Appropriate affect     Assessment:       ICD-10-CM ICD-9-CM   1. Upper respiratory tract infection, unspecified type  J06.9 465.9   2. Exposure to influenza  Z20.828 V01.79        Plan:     1. Upper respiratory tract infection, unspecified type  Assessment & Plan:  Recommended swab for flu/COVID; patient adamantly refused.  Start Tamiflu 75 mg by mouth b.i.d. x5 days per patient request due to recent exposure to influenza and symptom onset within 2 days.  Start Mucinex 600 mg by mouth b.i.d. times 10 days p.r.n. congestion  Use OTC cold meds for symptoms (HTN and DM pt to avoid Sudafed or pseudoephedrine products).  Use OTC Tylenol or Advil for fever or body aches.  Get plenty of rest, eat a healthy diet, avoid alcohol and smoking.  Sore Throat: Use OTC lozenges or throat sprays, gargle with warm salt and water, warm tea with honey.  Nasal Congestion: Use humidifier or steamy shower.  Cough: Use Dextromethorphan/Doxylamine Cough Syrup at night.  Report fever greater than 101 F, breathing problems, painful or worsening cough, or changes in mucous (green, yellow, bloody).  Cover your mouth with coughing, avoid sharing cups or lip balm, wash your hand before eating or touching your face.     Orders:  -     oseltamivir (TAMIFLU) 75 MG capsule; Take 1 capsule (75 mg total) by mouth 2 (two) times daily. for 5 days  Dispense: 10 capsule; Refill: 0  -     guaiFENesin (MUCINEX) 600 mg 12 hr tablet; Take 1 tablet (600 mg total) by mouth 2 (two) times daily. for 10 days   Dispense: 20 tablet; Refill: 0    2. Exposure to influenza  Assessment & Plan:  See #1    Orders:  -     oseltamivir (TAMIFLU) 75 MG capsule; Take 1 capsule (75 mg total) by mouth 2 (two) times daily. for 5 days  Dispense: 10 capsule; Refill: 0  -     guaiFENesin (MUCINEX) 600 mg 12 hr tablet; Take 1 tablet (600 mg total) by mouth 2 (two) times daily. for 10 days  Dispense: 20 tablet; Refill: 0         Follow up if symptoms worsen or fail to improve. In addition to their scheduled follow up, the patient has also been instructed to follow up on as needed basis.     Future Appointments   Date Time Provider Department Center   3/17/2025  1:45 PM Obey Baker MD [facility]    7/17/2025  2:30 PM Obey Baker MD [facility]   1/13/2026  1:30 PM Obey Baker MD [facility]         Video Time Documentation:  Spent 10 minutes with patient face to face discussed health concerns. More than 50% of this time was spent in counseling and coordination of care.    Saba Farley NP

## 2024-12-30 NOTE — ASSESSMENT & PLAN NOTE
Recommended swab for flu/COVID; patient adamantly refused.  Start Tamiflu 75 mg by mouth b.i.d. x5 days per patient request due to recent exposure to influenza and symptom onset within 2 days.  Start Mucinex 600 mg by mouth b.i.d. times 10 days p.r.n. congestion  Use OTC cold meds for symptoms (HTN and DM pt to avoid Sudafed or pseudoephedrine products).  Use OTC Tylenol or Advil for fever or body aches.  Get plenty of rest, eat a healthy diet, avoid alcohol and smoking.  Sore Throat: Use OTC lozenges or throat sprays, gargle with warm salt and water, warm tea with honey.  Nasal Congestion: Use humidifier or steamy shower.  Cough: Use Dextromethorphan/Doxylamine Cough Syrup at night.  Report fever greater than 101 F, breathing problems, painful or worsening cough, or changes in mucous (green, yellow, bloody).  Cover your mouth with coughing, avoid sharing cups or lip balm, wash your hand before eating or touching your face.

## 2025-02-10 ENCOUNTER — OFFICE VISIT (OUTPATIENT)
Dept: FAMILY MEDICINE | Facility: CLINIC | Age: 70
End: 2025-02-10
Payer: MEDICARE

## 2025-02-10 VITALS
OXYGEN SATURATION: 98 % | TEMPERATURE: 97 F | BODY MASS INDEX: 30.73 KG/M2 | WEIGHT: 180 LBS | RESPIRATION RATE: 18 BRPM | HEIGHT: 64 IN | SYSTOLIC BLOOD PRESSURE: 118 MMHG | HEART RATE: 73 BPM | DIASTOLIC BLOOD PRESSURE: 72 MMHG

## 2025-02-10 DIAGNOSIS — K21.9 GASTROESOPHAGEAL REFLUX DISEASE, UNSPECIFIED WHETHER ESOPHAGITIS PRESENT: ICD-10-CM

## 2025-02-10 DIAGNOSIS — Z79.4 TYPE 2 DIABETES MELLITUS WITH DIABETIC PERIPHERAL ANGIOPATHY WITHOUT GANGRENE, WITH LONG-TERM CURRENT USE OF INSULIN: ICD-10-CM

## 2025-02-10 DIAGNOSIS — M25.50 ARTHRALGIA, UNSPECIFIED JOINT: Primary | ICD-10-CM

## 2025-02-10 DIAGNOSIS — E11.51 TYPE 2 DIABETES MELLITUS WITH DIABETIC PERIPHERAL ANGIOPATHY WITHOUT GANGRENE, WITH LONG-TERM CURRENT USE OF INSULIN: ICD-10-CM

## 2025-02-10 PROCEDURE — 1159F MED LIST DOCD IN RCRD: CPT | Mod: CPTII,,, | Performed by: FAMILY MEDICINE

## 2025-02-10 PROCEDURE — 99214 OFFICE O/P EST MOD 30 MIN: CPT | Mod: ,,, | Performed by: FAMILY MEDICINE

## 2025-02-10 PROCEDURE — 1160F RVW MEDS BY RX/DR IN RCRD: CPT | Mod: CPTII,,, | Performed by: FAMILY MEDICINE

## 2025-02-10 PROCEDURE — 3008F BODY MASS INDEX DOCD: CPT | Mod: CPTII,,, | Performed by: FAMILY MEDICINE

## 2025-02-10 PROCEDURE — 1125F AMNT PAIN NOTED PAIN PRSNT: CPT | Mod: CPTII,,, | Performed by: FAMILY MEDICINE

## 2025-02-10 PROCEDURE — 3078F DIAST BP <80 MM HG: CPT | Mod: CPTII,,, | Performed by: FAMILY MEDICINE

## 2025-02-10 PROCEDURE — 3074F SYST BP LT 130 MM HG: CPT | Mod: CPTII,,, | Performed by: FAMILY MEDICINE

## 2025-02-10 RX ORDER — FAMOTIDINE 40 MG/1
40 TABLET, FILM COATED ORAL DAILY
Qty: 30 TABLET | Refills: 1 | Status: SHIPPED | OUTPATIENT
Start: 2025-02-10 | End: 2025-04-11

## 2025-02-10 NOTE — PROGRESS NOTES
"Subjective:     Patient ID: Starr Kidd is a 69 y.o. female.    Chief Complaint: Arthritis (Wants referral to rheumatologist )        History of Present Illness    CHIEF COMPLAINT:  Patient presents today for joint pain and stomach issues.    MUSCULOSKELETAL:  She reports daily joint pain, which she describes as bone pain that she cannot escape. She is concerned about developing arthritis.    GASTROINTESTINAL:  She reports recent stomach flu confirmed through virtual visit with CALLI Walter. She was sick for 8 days and her abdomen has not returned to baseline. She currently takes Pepsi Complete and Capiscon for stomach symptoms. She discontinued probiotics due to adverse reaction to zinc and magnesium components.    MEDICAL HISTORY:  She has type 1 diabetes, which was identified as genetic by Dr. Umaña.    FAMILY HISTORY:  She has a family history of type 1 diabetes.      ROS:  Gastrointestinal: no vomiting  Musculoskeletal: +joint pain, +bone pain        Review of Systems   Constitutional: Negative.    Respiratory: Negative.     Cardiovascular: Negative.    Gastrointestinal:  Positive for reflux.   Musculoskeletal:  Positive for arthralgias.           Objective:     /72   Pulse 73   Temp 97 °F (36.1 °C) (Temporal)   Resp 18   Ht 5' 3.78" (1.62 m)   Wt 81.6 kg (180 lb)   SpO2 98%   BMI 31.11 kg/m²    Physical Exam  Constitutional:       Appearance: Normal appearance.   Cardiovascular:      Rate and Rhythm: Normal rate and regular rhythm.      Heart sounds: Normal heart sounds.   Pulmonary:      Effort: Pulmonary effort is normal.      Breath sounds: Normal breath sounds.   Neurological:      Mental Status: She is alert.   Psychiatric:         Mood and Affect: Mood normal.         Behavior: Behavior normal.         Thought Content: Thought content normal.         Judgment: Judgment normal.             Assessment:     Problem List Items Addressed This Visit          Endocrine    Type 2 diabetes " mellitus with diabetic peripheral angiopathy without gangrene, with long-term current use of insulin    Current Assessment & Plan     Followed by Dr Finley  Continue Plavix          Other Visit Diagnoses       Arthralgia, unspecified joint    -  Primary    Relevant Orders    Ambulatory referral/consult to Rheumatology    Gastroesophageal reflux disease, unspecified whether esophagitis present        Relevant Medications    famotidine (PEPCID) 40 MG tablet             Plan:   1. Arthralgia, unspecified joint  -     Ambulatory referral/consult to Rheumatology; Future; Expected date: 02/10/2025  Refer patient to Rheumatology     2. Type 2 diabetes mellitus with diabetic peripheral angiopathy without gangrene, with long-term current use of insulin  Assessment & Plan:  Followed by Dr Finley  Continue Plavix    3. Gastroesophageal reflux disease, unspecified whether esophagitis present  -     famotidine (PEPCID) 40 MG tablet; Take 1 tablet (40 mg total) by mouth once daily.  Dispense: 30 tablet; Refill: 1  Continue Protonix 40 mg q.day   Add famotidine 40 mg q.p.m.   Diet modification   Reflux precautions   Monitor   Return to clinic with any concerns                 This note was generated with the assistance of ambient listening technology. Verbal consent was obtained by the patient and accompanying visitor(s) for the recording of patient appointment to facilitate this note. I attest to having reviewed and edited the generated note for accuracy, though some syntax or spelling errors may persist. Please contact the author of this note for any clarification.

## 2025-02-25 ENCOUNTER — PATIENT OUTREACH (OUTPATIENT)
Facility: CLINIC | Age: 70
End: 2025-02-25
Payer: MEDICARE

## 2025-02-25 NOTE — LETTER
Dear Joana [facility] is committed to your overall health. Periodically we review the health information in your chart to make sure you are up to date on all of your recommended tests and/or procedures.       Our review of your chart shows that you may be due for       VBHM Score: 0     Patient is not due for any topics at this time.                    If you have had any of the above done at another facility, please let us know and we will request a copy of the report to update your [facility] record.     Gentle Reminder- Your next appointment with Dr Baker is 3/17/2025 @ 1:45 pm    At your convenience I would like to speak to you to help get these items scheduled (if needed) and also see if there is anything else we can do to help you. Please send me a message via your patient portal or give me a call at [phone].  I am looking forward to speaking with you soon.     Sincerely,      Rebeca Care Coordinator  Obey Baker MD and your [facility] Team

## 2025-02-25 NOTE — PROGRESS NOTES
2/25/2025- Sent patient portal letter. Patient has appointment with Primary Care 3/17/2025 and will have Hgb A1c done at that time.Chart reviewed/updated.

## 2025-03-04 DIAGNOSIS — E11.9 TYPE 2 DIABETES MELLITUS WITHOUT COMPLICATION, WITH LONG-TERM CURRENT USE OF INSULIN: ICD-10-CM

## 2025-03-04 DIAGNOSIS — Z79.4 TYPE 2 DIABETES MELLITUS WITHOUT COMPLICATION, WITH LONG-TERM CURRENT USE OF INSULIN: ICD-10-CM

## 2025-03-05 RX ORDER — DAPAGLIFLOZIN 10 MG/1
10 TABLET, FILM COATED ORAL
Qty: 90 TABLET | Refills: 3 | Status: SHIPPED | OUTPATIENT
Start: 2025-03-05

## 2025-03-07 DIAGNOSIS — K21.9 GASTROESOPHAGEAL REFLUX DISEASE, UNSPECIFIED WHETHER ESOPHAGITIS PRESENT: ICD-10-CM

## 2025-03-07 RX ORDER — FAMOTIDINE 40 MG/1
40 TABLET, FILM COATED ORAL
Qty: 60 TABLET | Refills: 3 | Status: SHIPPED | OUTPATIENT
Start: 2025-03-07

## 2025-03-13 ENCOUNTER — LAB VISIT (OUTPATIENT)
Dept: LAB | Facility: HOSPITAL | Age: 70
End: 2025-03-13
Attending: FAMILY MEDICINE
Payer: MEDICARE

## 2025-03-13 DIAGNOSIS — E11.51 TYPE 2 DIABETES MELLITUS WITH DIABETIC PERIPHERAL ANGIOPATHY WITHOUT GANGRENE, WITH LONG-TERM CURRENT USE OF INSULIN: ICD-10-CM

## 2025-03-13 DIAGNOSIS — Z79.4 TYPE 2 DIABETES MELLITUS WITH DIABETIC PERIPHERAL ANGIOPATHY WITHOUT GANGRENE, WITH LONG-TERM CURRENT USE OF INSULIN: ICD-10-CM

## 2025-03-13 LAB
EST. AVERAGE GLUCOSE BLD GHB EST-MCNC: 257.5 MG/DL
HBA1C MFR BLD: 10.6 %

## 2025-03-13 PROCEDURE — 36415 COLL VENOUS BLD VENIPUNCTURE: CPT

## 2025-03-13 PROCEDURE — 83036 HEMOGLOBIN GLYCOSYLATED A1C: CPT

## 2025-03-17 ENCOUNTER — OFFICE VISIT (OUTPATIENT)
Dept: FAMILY MEDICINE | Facility: CLINIC | Age: 70
End: 2025-03-17
Payer: MEDICARE

## 2025-03-17 VITALS
OXYGEN SATURATION: 96 % | HEIGHT: 64 IN | SYSTOLIC BLOOD PRESSURE: 126 MMHG | RESPIRATION RATE: 18 BRPM | BODY MASS INDEX: 29.88 KG/M2 | WEIGHT: 175 LBS | TEMPERATURE: 97 F | HEART RATE: 75 BPM | DIASTOLIC BLOOD PRESSURE: 82 MMHG

## 2025-03-17 DIAGNOSIS — E11.65 TYPE 2 DIABETES MELLITUS WITH HYPERGLYCEMIA, WITH LONG-TERM CURRENT USE OF INSULIN: Primary | ICD-10-CM

## 2025-03-17 DIAGNOSIS — J32.9 SINUSITIS, UNSPECIFIED CHRONICITY, UNSPECIFIED LOCATION: ICD-10-CM

## 2025-03-17 DIAGNOSIS — Z79.4 TYPE 2 DIABETES MELLITUS WITH HYPERGLYCEMIA, WITH LONG-TERM CURRENT USE OF INSULIN: Primary | ICD-10-CM

## 2025-03-17 LAB — HBA1C MFR BLD: 10.9 %

## 2025-03-17 PROCEDURE — 3079F DIAST BP 80-89 MM HG: CPT | Mod: CPTII,,, | Performed by: FAMILY MEDICINE

## 2025-03-17 PROCEDURE — G2211 COMPLEX E/M VISIT ADD ON: HCPCS | Mod: ,,, | Performed by: FAMILY MEDICINE

## 2025-03-17 PROCEDURE — 1159F MED LIST DOCD IN RCRD: CPT | Mod: CPTII,,, | Performed by: FAMILY MEDICINE

## 2025-03-17 PROCEDURE — 3074F SYST BP LT 130 MM HG: CPT | Mod: CPTII,,, | Performed by: FAMILY MEDICINE

## 2025-03-17 PROCEDURE — 1126F AMNT PAIN NOTED NONE PRSNT: CPT | Mod: CPTII,,, | Performed by: FAMILY MEDICINE

## 2025-03-17 PROCEDURE — 99214 OFFICE O/P EST MOD 30 MIN: CPT | Mod: ,,, | Performed by: FAMILY MEDICINE

## 2025-03-17 PROCEDURE — 3046F HEMOGLOBIN A1C LEVEL >9.0%: CPT | Mod: CPTII,,, | Performed by: FAMILY MEDICINE

## 2025-03-17 PROCEDURE — 83036 HEMOGLOBIN GLYCOSYLATED A1C: CPT | Mod: QW,,, | Performed by: FAMILY MEDICINE

## 2025-03-17 PROCEDURE — 3008F BODY MASS INDEX DOCD: CPT | Mod: CPTII,,, | Performed by: FAMILY MEDICINE

## 2025-03-17 PROCEDURE — 1160F RVW MEDS BY RX/DR IN RCRD: CPT | Mod: CPTII,,, | Performed by: FAMILY MEDICINE

## 2025-03-17 RX ORDER — CHOLESTYRAMINE LIGHT 4 G/5.7G
4 POWDER, FOR SUSPENSION ORAL DAILY
COMMUNITY
Start: 2025-02-07

## 2025-03-17 RX ORDER — AZITHROMYCIN 250 MG/1
TABLET, FILM COATED ORAL
Qty: 6 TABLET | Refills: 0 | Status: SHIPPED | OUTPATIENT
Start: 2025-03-17

## 2025-03-17 NOTE — PROGRESS NOTES
"Subjective:     Patient ID: Starr Kidd is a 69 y.o. female.    Chief Complaint: 3 month f/u        History of Present Illness    CHIEF COMPLAINT:  Patient presents today for follow-up of diabetes management and sinus infection.    DIABETES MANAGEMENT:  She reports elevated blood sugars with A1C increasing from 10.6 to 10.9. She experiences significant blood sugar fluctuations with morning readings ranging from 72-92 and afternoon levels spiking to over 300. She has self-increased Lantus insulin from 35 units to 38-40 units over the past two nights. She reports being prescribed a medication change that has not yet been received.    SINUS INFECTION:  She reports sinus pain in the upper region of face with severe headaches. She describes nasal congestion with a sensation of being "congested" and experiences a burning sensation when breathing. She had ear pain which has since resolved. She experienced a nosebleed last week, managed with packing and cold compress, and reports intermittent episodes that resolve with the same management. She denies current bloody nasal discharge.    MUSCULOSKELETAL:  She reports neck pain for several months.    ALLERGIES:  She has allergies to penicillin and sulfa medications. She reports tolerating azithromycin well in the past.      ROS:  ENT: no ear pain, +nasal congestion, +nasal discharge, +sinus pressure, +nosebleeds  Respiratory: +difficulty breathing, +pain with respiration  Musculoskeletal: +neck pain  Neurological: +headache            Review of Systems   Constitutional:  Positive for chills. Negative for fever.   HENT:  Positive for sinus pressure/congestion.    Cardiovascular: Negative.    Gastrointestinal: Negative.    Neurological:  Positive for headaches.   Psychiatric/Behavioral: Negative.         Objective:     /82   Pulse 75   Temp 97.1 °F (36.2 °C) (Temporal)   Resp 18   Ht 5' 3.78" (1.62 m)   Wt 79.4 kg (175 lb)   SpO2 96%   BMI 30.25 kg/m²  "   Physical Exam  Exam conducted with a chaperone present.   Constitutional:       Appearance: Normal appearance.   HENT:      Right Ear: Tympanic membrane normal.      Left Ear: Tympanic membrane normal.      Nose: Congestion and rhinorrhea present.      Right Sinus: Maxillary sinus tenderness present.      Left Sinus: Maxillary sinus tenderness present.      Mouth/Throat:      Mouth: Mucous membranes are moist.      Pharynx: Oropharynx is clear. Uvula midline.   Cardiovascular:      Rate and Rhythm: Normal rate and regular rhythm.      Heart sounds: Normal heart sounds.   Pulmonary:      Effort: Pulmonary effort is normal.      Breath sounds: Normal breath sounds.   Lymphadenopathy:      Cervical: Cervical adenopathy (bilateral submandibular) present.   Neurological:      Mental Status: She is alert.   Psychiatric:         Mood and Affect: Mood normal.         Behavior: Behavior normal.         Thought Content: Thought content normal.         Judgment: Judgment normal.       Recent Results (from the past 3 weeks)   Hemoglobin A1C    Collection Time: 03/13/25 11:39 AM   Result Value Ref Range    Hemoglobin A1c 10.6 (H) <=7.0 %    Estimated Average Glucose 257.5 mg/dL   POCT Glycosylated Hemoglobin (HGB A1c)    Collection Time: 03/17/25  2:09 PM   Result Value Ref Range    Hemoglobin A1C, POC 10.9 (A) %            Assessment:     Problem List Items Addressed This Visit          Endocrine    Diabetes mellitus - Primary    Relevant Orders    POCT Glycosylated Hemoglobin (HGB A1c) (Completed)     Other Visit Diagnoses         Sinusitis, unspecified chronicity, unspecified location        Relevant Medications    azithromycin (Z-KEV) 250 MG tablet             Plan:   1. Type 2 diabetes mellitus with hyperglycemia, with long-term current use of insulin  -     POCT Glycosylated Hemoglobin (HGB A1c)  Lab work discussed with patient  Continue current medication   Insulin sliding scale given to patient   Monitor CBGS   Strict  diet modification   Keep scheduled appointment with Dr. Umaña  Return to clinic with any concerns     2. Sinusitis, unspecified chronicity, unspecified location  -     azithromycin (Z-KEV) 250 MG tablet; Take 2 tablets by mouth on day 1; Take 1 tablet by mouth on days 2-5  Dispense: 6 tablet; Refill: 0  Rx for above medication  OTC medications as needed  Monitor  Return to clinic with concerns                    This note was generated with the assistance of ambient listening technology. Verbal consent was obtained by the patient and accompanying visitor(s) for the recording of patient appointment to facilitate this note. I attest to having reviewed and edited the generated note for accuracy, though some syntax or spelling errors may persist. Please contact the author of this note for any clarification.

## 2025-05-02 DIAGNOSIS — Z79.4 TYPE 2 DIABETES MELLITUS WITHOUT COMPLICATION, WITH LONG-TERM CURRENT USE OF INSULIN: ICD-10-CM

## 2025-05-02 DIAGNOSIS — E11.9 TYPE 2 DIABETES MELLITUS WITHOUT COMPLICATION, WITH LONG-TERM CURRENT USE OF INSULIN: ICD-10-CM

## 2025-05-05 RX ORDER — DAPAGLIFLOZIN 10 MG/1
10 TABLET, FILM COATED ORAL
Qty: 100 TABLET | Refills: 3 | Status: SHIPPED | OUTPATIENT
Start: 2025-05-05

## 2025-05-19 DIAGNOSIS — K21.9 GASTROESOPHAGEAL REFLUX DISEASE, UNSPECIFIED WHETHER ESOPHAGITIS PRESENT: ICD-10-CM

## 2025-05-19 RX ORDER — FAMOTIDINE 40 MG/1
40 TABLET, FILM COATED ORAL
Qty: 100 TABLET | Refills: 3 | Status: SHIPPED | OUTPATIENT
Start: 2025-05-19

## 2025-06-12 ENCOUNTER — PATIENT OUTREACH (OUTPATIENT)
Facility: CLINIC | Age: 70
End: 2025-06-12
Payer: MEDICARE

## 2025-06-12 DIAGNOSIS — Z79.4 TYPE 2 DIABETES MELLITUS WITH DIABETIC PERIPHERAL ANGIOPATHY WITHOUT GANGRENE, WITH LONG-TERM CURRENT USE OF INSULIN: Primary | ICD-10-CM

## 2025-06-12 DIAGNOSIS — E11.51 TYPE 2 DIABETES MELLITUS WITH DIABETIC PERIPHERAL ANGIOPATHY WITHOUT GANGRENE, WITH LONG-TERM CURRENT USE OF INSULIN: Primary | ICD-10-CM

## 2025-06-12 NOTE — PROGRESS NOTES
Final Anesthesia Post-op Assessment    Patient: Alis Thakur  Procedure(s) Performed: COLONOSCOPY  Anesthesia type: Monitor Anesthesia Care    Vitals Value Taken Time   Temp 36.1 °C (97 °F) 9/23/2019 12:32 PM   Pulse 68 9/23/2019  1:35 PM   Resp 20 9/23/2019 12:32 PM   SpO2 100 % 9/23/2019  1:35 PM   /67 9/23/2019  1:27 PM   Vitals shown include unvalidated device data.    Last 24 I/O:     Intake/Output Summary (Last 24 hours) at 9/23/2019 1344  Last data filed at 9/23/2019 1227  Gross per 24 hour   Intake 800 ml   Output --   Net 800 ml       PATIENT LOCATION: Phase II  POST-OP VITAL SIGNS: stable  LEVEL OF CONSCIOUSNESS: participates in exam, answers questions appropriately, alert and awake  RESPIRATORY STATUS: spontaneous ventilation  CARDIOVASCULAR: blood pressure returned to baseline  HYDRATION: euvolemic    PAIN MANAGEMENT: well controlled  NAUSEA: None  AIRWAY PATENCY: patent  POST-OP ASSESSMENT: no complications, patient tolerated procedure well with no complications and sufficiently recovered from acute administration of anesthesia effects and able to participate in evaluation  COMPLICATIONS: none  Comments:     HANDOFF:  Handoff to receiving nurse was performed and questions were answered       Health Maintenance Topic(s) Outreach Outcomes & Actions Taken:    Lab(s) - Outreach Outcomes & Actions Taken  : Overdue Lab(s) Ordered and A!C ordered per [employer] gap report       Additional Notes:

## 2025-06-20 ENCOUNTER — HOSPITAL ENCOUNTER (EMERGENCY)
Facility: HOSPITAL | Age: 70
Discharge: HOME | End: 2025-06-20
Attending: FAMILY MEDICINE
Payer: MEDICARE

## 2025-06-20 VITALS
HEART RATE: 99 BPM | OXYGEN SATURATION: 96 % | WEIGHT: 180 LBS | HEIGHT: 64 IN | SYSTOLIC BLOOD PRESSURE: 162 MMHG | RESPIRATION RATE: 18 BRPM | TEMPERATURE: 97 F | DIASTOLIC BLOOD PRESSURE: 100 MMHG | BODY MASS INDEX: 30.73 KG/M2

## 2025-06-20 DIAGNOSIS — N28.89 RENAL CALCIFICATION: ICD-10-CM

## 2025-06-20 DIAGNOSIS — N26.1 RENAL ATROPHY: Primary | ICD-10-CM

## 2025-06-20 LAB
ALBUMIN SERPL-MCNC: 3.4 G/DL (ref 3.4–4.8)
ALBUMIN/GLOB SERPL: 1 RATIO (ref 1.1–2)
ALP SERPL-CCNC: 70 UNIT/L (ref 40–150)
ALT SERPL-CCNC: 41 UNIT/L (ref 0–55)
ANION GAP SERPL CALC-SCNC: 8 MEQ/L
AST SERPL-CCNC: 38 UNIT/L (ref 11–45)
BACTERIA #/AREA URNS AUTO: NORMAL /HPF
BASOPHILS # BLD AUTO: 0.05 X10(3)/MCL
BASOPHILS NFR BLD AUTO: 0.9 %
BILIRUB SERPL-MCNC: 0.6 MG/DL
BILIRUB UR QL STRIP.AUTO: NEGATIVE
BUN SERPL-MCNC: 14 MG/DL (ref 9.8–20.1)
CALCIUM SERPL-MCNC: 9.4 MG/DL (ref 8.4–10.2)
CHLORIDE SERPL-SCNC: 106 MMOL/L (ref 98–107)
CLARITY UR: CLEAR
CO2 SERPL-SCNC: 30 MMOL/L (ref 23–31)
COLOR UR AUTO: YELLOW
CREAT SERPL-MCNC: 0.71 MG/DL (ref 0.55–1.02)
CREAT/UREA NIT SERPL: 20
EOSINOPHIL # BLD AUTO: 0.15 X10(3)/MCL (ref 0–0.9)
EOSINOPHIL NFR BLD AUTO: 2.6 %
ERYTHROCYTE [DISTWIDTH] IN BLOOD BY AUTOMATED COUNT: 12.8 % (ref 11.5–17)
GFR SERPLBLD CREATININE-BSD FMLA CKD-EPI: >60 ML/MIN/1.73/M2
GLOBULIN SER-MCNC: 3.5 GM/DL (ref 2.4–3.5)
GLUCOSE SERPL-MCNC: 140 MG/DL (ref 82–115)
GLUCOSE UR QL STRIP: ABNORMAL
HCT VFR BLD AUTO: 41.4 % (ref 37–47)
HGB BLD-MCNC: 13.4 G/DL (ref 12–16)
HGB UR QL STRIP: ABNORMAL
IMM GRANULOCYTES # BLD AUTO: 0 X10(3)/MCL (ref 0–0.04)
IMM GRANULOCYTES NFR BLD AUTO: 0 %
KETONES UR QL STRIP: NEGATIVE
LACTATE SERPL-SCNC: 1.9 MMOL/L (ref 0.5–2.2)
LEUKOCYTE ESTERASE UR QL STRIP: NEGATIVE
LYMPHOCYTES # BLD AUTO: 1.9 X10(3)/MCL (ref 0.6–4.6)
LYMPHOCYTES NFR BLD AUTO: 33.5 %
MCH RBC QN AUTO: 30.5 PG (ref 27–31)
MCHC RBC AUTO-ENTMCNC: 32.4 G/DL (ref 33–36)
MCV RBC AUTO: 94.1 FL (ref 80–94)
MONOCYTES # BLD AUTO: 0.45 X10(3)/MCL (ref 0.1–1.3)
MONOCYTES NFR BLD AUTO: 7.9 %
NEUTROPHILS # BLD AUTO: 3.13 X10(3)/MCL (ref 2.1–9.2)
NEUTROPHILS NFR BLD AUTO: 55.1 %
NITRITE UR QL STRIP: NEGATIVE
NRBC BLD AUTO-RTO: 0 %
PH UR STRIP: 7 [PH]
PLATELET # BLD AUTO: 219 X10(3)/MCL (ref 130–400)
PMV BLD AUTO: 9.1 FL (ref 7.4–10.4)
POTASSIUM SERPL-SCNC: 4 MMOL/L (ref 3.5–5.1)
PROT SERPL-MCNC: 6.9 GM/DL (ref 5.8–7.6)
PROT UR QL STRIP: NEGATIVE
RBC # BLD AUTO: 4.4 X10(6)/MCL (ref 4.2–5.4)
RBC #/AREA URNS AUTO: NORMAL /HPF
SODIUM SERPL-SCNC: 144 MMOL/L (ref 136–145)
SP GR UR STRIP.AUTO: <=1.005 (ref 1–1.03)
SQUAMOUS #/AREA URNS AUTO: NORMAL /HPF
UROBILINOGEN UR STRIP-ACNC: 0.2
WBC # BLD AUTO: 5.68 X10(3)/MCL (ref 4.5–11.5)
WBC #/AREA URNS AUTO: NORMAL /HPF

## 2025-06-20 PROCEDURE — 25000003 PHARM REV CODE 250: Performed by: FAMILY MEDICINE

## 2025-06-20 PROCEDURE — 85025 COMPLETE CBC W/AUTO DIFF WBC: CPT | Performed by: FAMILY MEDICINE

## 2025-06-20 PROCEDURE — 63600175 PHARM REV CODE 636 W HCPCS: Mod: JZ,TB | Performed by: FAMILY MEDICINE

## 2025-06-20 PROCEDURE — 81003 URINALYSIS AUTO W/O SCOPE: CPT | Performed by: FAMILY MEDICINE

## 2025-06-20 PROCEDURE — 96361 HYDRATE IV INFUSION ADD-ON: CPT

## 2025-06-20 PROCEDURE — 99285 EMERGENCY DEPT VISIT HI MDM: CPT | Mod: 25

## 2025-06-20 PROCEDURE — 87040 BLOOD CULTURE FOR BACTERIA: CPT | Performed by: FAMILY MEDICINE

## 2025-06-20 PROCEDURE — 96374 THER/PROPH/DIAG INJ IV PUSH: CPT

## 2025-06-20 PROCEDURE — 80053 COMPREHEN METABOLIC PANEL: CPT | Performed by: FAMILY MEDICINE

## 2025-06-20 PROCEDURE — 96375 TX/PRO/DX INJ NEW DRUG ADDON: CPT

## 2025-06-20 PROCEDURE — 83605 ASSAY OF LACTIC ACID: CPT | Performed by: FAMILY MEDICINE

## 2025-06-20 RX ORDER — PANTOPRAZOLE SODIUM 40 MG/10ML
40 INJECTION, POWDER, LYOPHILIZED, FOR SOLUTION INTRAVENOUS
Status: COMPLETED | OUTPATIENT
Start: 2025-06-20 | End: 2025-06-20

## 2025-06-20 RX ORDER — TRAMADOL HYDROCHLORIDE 50 MG/1
50 TABLET, FILM COATED ORAL EVERY 6 HOURS PRN
Qty: 15 TABLET | Refills: 0 | Status: SHIPPED | OUTPATIENT
Start: 2025-06-20

## 2025-06-20 RX ORDER — KETOROLAC TROMETHAMINE 30 MG/ML
30 INJECTION, SOLUTION INTRAMUSCULAR; INTRAVENOUS
Status: COMPLETED | OUTPATIENT
Start: 2025-06-20 | End: 2025-06-20

## 2025-06-20 RX ORDER — TRAMADOL HYDROCHLORIDE 50 MG/1
50 TABLET, FILM COATED ORAL
Status: COMPLETED | OUTPATIENT
Start: 2025-06-20 | End: 2025-06-20

## 2025-06-20 RX ORDER — ONDANSETRON HYDROCHLORIDE 2 MG/ML
4 INJECTION, SOLUTION INTRAVENOUS
Status: COMPLETED | OUTPATIENT
Start: 2025-06-20 | End: 2025-06-20

## 2025-06-20 RX ADMIN — TRAMADOL HYDROCHLORIDE 50 MG: 50 TABLET, COATED ORAL at 01:06

## 2025-06-20 RX ADMIN — ONDANSETRON 4 MG: 2 INJECTION INTRAMUSCULAR; INTRAVENOUS at 11:06

## 2025-06-20 RX ADMIN — PANTOPRAZOLE SODIUM 40 MG: 40 INJECTION, POWDER, FOR SOLUTION INTRAVENOUS at 01:06

## 2025-06-20 RX ADMIN — KETOROLAC TROMETHAMINE 30 MG: 30 INJECTION, SOLUTION INTRAMUSCULAR at 12:06

## 2025-06-20 RX ADMIN — SODIUM CHLORIDE, POTASSIUM CHLORIDE, SODIUM LACTATE AND CALCIUM CHLORIDE 500 ML: 600; 310; 30; 20 INJECTION, SOLUTION INTRAVENOUS at 11:06

## 2025-06-20 NOTE — ED PROVIDER NOTES
Encounter Date: 6/20/2025       History     Chief Complaint   Patient presents with    Urinary Tract Infection     Ongoing UTI that was dx early this week.  Pt on antibiotics     This patient is a 69-year-old female that actually came to the emergency room because she was called by her urologist office and told to come in.  Urologist office states that the walk-in clinic and sent a culture to them with 2 organisms that were actively causing her infection.  Patient comes to the ER stating that she continues to have a bad urinary tract infection and will now need to be on IV antibiotics because she is currently on levothyroxine.  When we check her urine in the ER, it is negative    The history is provided by the patient.     Review of patient's allergies indicates:   Allergen Reactions    Bethanechol Shortness Of Breath    Codeine Shortness Of Breath     Other reaction(s): ANAPHYLAXIS    Gabapentin Swelling    Iodine and iodide containing products Anaphylaxis and Swelling     Other reaction(s): Respiratory Distress    Meperidine Shortness Of Breath     Other reaction(s): Respiratory Distress    Penicillins Anaphylaxis    Phenergan [promethazine] Swelling    Pork derived (porcine) Anaphylaxis and Nausea And Vomiting    Pregabalin Anaphylaxis    Sulfa (sulfonamide antibiotics)      Other reaction(s): anaphylactic  Other reaction(s): Respiratory Distress    Ciprofloxacin      Other reaction(s): UNABLL TO EXPLAIN SIDE EFFECTS    Cymbalta [duloxetine]      Unknown      Dexamethasone Itching     Other reaction(s): Respiratory Distress    Estradiol      Other Reaction(s): Not available    Ivp dye [iodinated contrast media]      Other reaction(s): anaphylaxis    Latex, natural rubber Itching    Macrobid [nitrofurantoin monohyd/m-cryst] Edema    Propoxyphene n-acetaminophen      Other reaction(s): SOB    Saccharomyces cerevisiae      Other reaction(s): RASH    Egg derived Rash    Morphine Nausea And Vomiting     Past Medical  History:   Diagnosis Date    Diabetes mellitus     GERD (gastroesophageal reflux disease)     High cholesterol     History of blood clots     Migraine     Myalgia due to statin     S/P AKA (above knee amputation) bilateral 10/05/2023    Uses prosthesis      Past Surgical History:   Procedure Laterality Date    APPENDECTOMY      BACK SURGERY      BREAST SURGERY      CAROTID ENDARTERECTOMY      CARPAL TUNNEL RELEASE       SECTION      CHOLECYSTECTOMY      EPIDURAL STEROID INJECTION      EYE SURGERY      HYSTERECTOMY      INJECTION, SACROILIAC JOINT Left 2022    Dr Cristiano Farley    LEG AMPUTATION THROUGH KNEE Bilateral     NECK SURGERY      SINUS SURGERY      SPINE SURGERY      TUBAL LIGATION       Family History   Problem Relation Name Age of Onset    Heart disease Mother Bev Kidd     Arthritis Mother Bev Kidd     No Known Problems Father      Diabetes Paternal Grandmother Lalito Rivera      Social History[1]  Review of Systems   Constitutional: Negative.    HENT: Negative.     Respiratory: Negative.     Cardiovascular: Negative.    Gastrointestinal:  Positive for abdominal pain.   Endocrine: Negative.    Genitourinary: Negative.    Musculoskeletal: Negative.    Neurological: Negative.    Psychiatric/Behavioral: Negative.     All other systems reviewed and are negative.      Physical Exam     Initial Vitals [25 1108]   BP Pulse Resp Temp SpO2   (!) 152/72 77 20 97.3 °F (36.3 °C) 95 %      MAP       --         Physical Exam    Nursing note and vitals reviewed.  Constitutional: She appears well-developed.   HENT:   Head: Normocephalic.   Eyes: Pupils are equal, round, and reactive to light.   Neck:   Normal range of motion.  Cardiovascular:  Normal rate, regular rhythm and normal heart sounds.           Pulmonary/Chest: Breath sounds normal.   Abdominal: Abdomen is soft and protuberant. There is abdominal tenderness in the left lower quadrant.   Patient states that she has left lower  quadrant pain that appears to be moving is severe in nature       Musculoskeletal:         General: Normal range of motion.      Cervical back: Normal range of motion.      Comments: Patient has bilateral AKA     Neurological: She is alert and oriented to person, place, and time.   Skin: Skin is warm and dry.   Psychiatric: She has a normal mood and affect.         ED Course   Procedures  Labs Reviewed   COMPREHENSIVE METABOLIC PANEL - Abnormal       Result Value    Sodium 144      Potassium 4.0      Chloride 106      CO2 30      Glucose 140 (*)     Blood Urea Nitrogen 14.0      Creatinine 0.71      Calcium 9.4      Protein Total 6.9      Albumin 3.4      Globulin 3.5      Albumin/Globulin Ratio 1.0 (*)     Bilirubin Total 0.6      ALP 70      ALT 41      AST 38      eGFR >60      Anion Gap 8.0      BUN/Creatinine Ratio 20     CBC WITH DIFFERENTIAL - Abnormal    WBC 5.68      RBC 4.40      Hgb 13.4      Hct 41.4      MCV 94.1 (*)     MCH 30.5      MCHC 32.4 (*)     RDW 12.8      Platelet 219      MPV 9.1      Neut % 55.1      Lymph % 33.5      Mono % 7.9      Eos % 2.6      Basophil % 0.9      Imm Grans % 0.0      Neut # 3.13      Lymph # 1.90      Mono # 0.45      Eos # 0.15      Baso # 0.05      Imm Gran # 0.00      NRBC% 0.0     URINALYSIS, REFLEX TO URINE CULTURE - Abnormal    Color, UA Yellow      Appearance, UA Clear      Specific Gravity, UA <=1.005      pH, UA 7.0      Protein, UA Negative      Glucose, UA 3+ (*)     Ketones, UA Negative      Blood, UA 1+ (*)     Bilirubin, UA Negative      Urobilinogen, UA 0.2      Nitrites, UA Negative      Leukocyte Esterase, UA Negative     LACTIC ACID, PLASMA - Normal    Lactic Acid Level 1.9     URINALYSIS, MICROSCOPIC - Normal    Bacteria, UA Rare      RBC, UA 0-2      WBC, UA 0-2      Squamous Epithelial Cells, UA Rare     BLOOD CULTURE OLG   BLOOD CULTURE [facility]   CBC W/ AUTO DIFFERENTIAL    Narrative:     The following orders were created for panel order CBC auto  differential.  Procedure                               Abnormality         Status                     ---------                               -----------         ------                     CBC with Differential[[id number]]       Abnormal            Final result                 Please view results for these tests on the individual orders.          Imaging Results              CT Abdomen Pelvis  Without Contrast (Final result)  Result time 06/20/25 14:11:46      Final result by John Metzger MD (06/20/25 14:11:46)                   Impression:      1. Dense of acute abdominopelvic process.  2. No definite nephrolithiasis.  No hydronephrosis.  3. Renal atrophy bilaterally, asymmetrically worse on the left, similar compared to 10/18/2019.      Electronically signed by: John Metzger MD  Date:    06/20/2025  Time:    14:11               Narrative:    EXAMINATION:  CT ABDOMEN PELVIS WITHOUT CONTRAST    CLINICAL HISTORY:  Left flank pain    TECHNIQUE:  Axial CT images were obtained through the abdomen and pelvis without IV contrast.  Coronal and sagittal reconstructions submitted and interpreted.  Automated exposure control, dose radiation lowering technique, was utilized.    COMPARISON:  CT abdomen and pelvis without contrast from 10/18/2019    FINDINGS:  Heart size is normal.  There is scattered dependent atelectasis gallbladder surgically absent.  The liver, spleen, pancreas this appear normal.    There is a background of moderate to severe atherosclerotic plaque of the abdominal aorta and its branches.  Small vessel vascular calcifications are present.  Mass or calcifications are present at both renal dolores.  This limits evaluation for true underlying kidney stones.  Definite nephrolithiasis identified.  No stones identified along the length of either ureters.  Normal appearing bladder.  No intravesicular calculi.  There is asymmetric renal atrophy, worse on the left, moderate in severity.    Small hiatal  hernia is present.  The bowel is nonobstructed.  There is scattered colonic diverticuli.  No diverticulitis.  Patient status post appendectomy by history.    No free fluid.  No free air.  No enlarged abdominal or pelvic lymph nodes are present.  Patient status post posterior spinal fusion from L3-L5.  No suspicious osteolytic or osteoblastic lesion.  There are degenerative changes at the lower thoracic spine, throughout the lumbar spine and bony pelvis.                                       Medications   lactated ringers bolus 500 mL (0 mLs Intravenous Stopped 6/20/25 1237)   ondansetron injection 4 mg (4 mg Intravenous Given 6/20/25 1151)   ketorolac injection 30 mg (30 mg Intravenous Given 6/20/25 1232)   traMADoL tablet 50 mg (50 mg Oral Given 6/20/25 1358)   pantoprazole injection 40 mg (40 mg Intravenous Given 6/20/25 1358)     Medical Decision Making  This patient is a 69-year-old female who comes in with request for IV antibiotics because her urologist called her to tell her that she has 2 different organisms in her urine.  Urine is clear here in the ER but then patient started to complain about left lower quadrant pain that is radiating down her groin.  Differential diagnosis:  Kidney stone, urinary tract infection    Amount and/or Complexity of Data Reviewed  Labs: ordered.     Details: Patient's labs show that she in her urinalysis is 3+ glucose with 1+ blood, CMP shows a glucose of 140, lactic acid is 1.9, CBC is normal  Radiology: ordered.    Risk  Prescription drug management.                                      Clinical Impression:  Final diagnoses:  [N26.1] Renal atrophy (Primary)  [N28.89] Renal calcification          ED Disposition Condition    Discharge Stable          ED Prescriptions       Medication Sig Dispense Start Date End Date Auth. Provider    traMADoL (ULTRAM) 50 mg tablet Take 1 tablet (50 mg total) by mouth every 6 (six) hours as needed for Pain. 15 tablet 6/20/2025 -- Michelle  Mayi LEMUS MD          Follow-up Information       Follow up With Specialties Details Why Contact Info    Obey Baker MD Family Medicine Schedule an appointment as soon as possible for a visit in 2 days  [address]  [facility] [address]  [phone]                     [1]   Social History  Tobacco Use    Smoking status: Former     Current packs/day: 0.00     Average packs/day: 0.3 packs/day for 22.3 years (5.6 ttl pk-yrs)     Types: Cigarettes     Start date: 1999     Quit date: 2006     Years since quittin.1    Smokeless tobacco: Never   Substance Use Topics    Alcohol use: Not Currently    Drug use: Not Currently     Types: Hydrocodone        Mayi Martinez MD  25 1527

## 2025-06-25 LAB
BACTERIA BLD CULT: NORMAL
BACTERIA BLD CULT: NORMAL

## 2025-07-17 ENCOUNTER — OFFICE VISIT (OUTPATIENT)
Dept: FAMILY MEDICINE | Facility: CLINIC | Age: 70
End: 2025-07-17
Payer: MEDICARE

## 2025-07-17 VITALS
RESPIRATION RATE: 18 BRPM | OXYGEN SATURATION: 97 % | TEMPERATURE: 97 F | BODY MASS INDEX: 29.88 KG/M2 | HEIGHT: 64 IN | SYSTOLIC BLOOD PRESSURE: 122 MMHG | DIASTOLIC BLOOD PRESSURE: 68 MMHG | WEIGHT: 175 LBS | HEART RATE: 84 BPM

## 2025-07-17 DIAGNOSIS — Z79.4 TYPE 2 DIABETES MELLITUS WITH HYPERGLYCEMIA, WITH LONG-TERM CURRENT USE OF INSULIN: Primary | ICD-10-CM

## 2025-07-17 DIAGNOSIS — E11.65 TYPE 2 DIABETES MELLITUS WITH HYPERGLYCEMIA, WITH LONG-TERM CURRENT USE OF INSULIN: Primary | ICD-10-CM

## 2025-07-17 DIAGNOSIS — N20.0 NEPHROLITHIASIS: ICD-10-CM

## 2025-07-17 LAB — HBA1C MFR BLD: 8.5 %

## 2025-07-17 RX ORDER — OXYBUTYNIN CHLORIDE 5 MG/1
5 TABLET, EXTENDED RELEASE ORAL DAILY
COMMUNITY
Start: 2025-06-25

## 2025-07-17 RX ORDER — NITROFURANTOIN MACROCRYSTALS 50 MG/1
50 CAPSULE ORAL DAILY
COMMUNITY
Start: 2025-06-30 | End: 2025-07-17

## 2025-07-17 RX ORDER — INSULIN ASPART INJECTION 100 [IU]/ML
8 INJECTION, SOLUTION SUBCUTANEOUS
COMMUNITY
Start: 2025-07-07 | End: 2025-07-17

## 2025-07-17 RX ORDER — PEN NEEDLE, DIABETIC 32GX 5/32"
1 NEEDLE, DISPOSABLE MISCELLANEOUS 4 TIMES DAILY
COMMUNITY
Start: 2025-07-08

## 2025-07-17 NOTE — PROGRESS NOTES
"Subjective:     Patient ID: Starr Kidd is a 69 y.o. female.    Chief Complaint: 6 month f/u        History of Present Illness    CHIEF COMPLAINT:  Patient presents today for follow up of diabetes management.    DIABETES:  She reports morning glucose readings ranging from 62 to 113, with afternoon spikes reaching 187-200. A1C has improved from 10.9 in March to 8.5 currently, which she acknowledges is still elevated but notes is trending positively. She is unable to tolerate Pyas insulin due to significant adverse effects. She appears motivated to manage diabetes and is actively engaged in treatment, having recently seen a dietician and initiated new diabetes management strategies.    GENITOURINARY:  She is currently passing calcium oxalate kidney stones with associated hematuria for the past two weeks. She denies previous history of kidney stones but has experienced recent urinary calcifications. She consumes approximately one pint of milk daily. Calcium level was 9.4, which is within normal range. She also reports overactive bladder managed with oxybutynin 5mg twice daily, experiencing burning sensation and urgency with urination.    MEDICATION SIDE EFFECTS:  She reports significant GI side effects from current medications, including nausea and stomach discomfort described as "eating a hole in her abdomen" and feeling "always spinning" after taking medications. She specifically notes intolerance to Macrobid, which persists even with attempted Benadryl management. Benadryl is no longer effective in managing her medication-related symptoms. She denies diarrhea.      ROS:  Gastrointestinal: +abdominal pain, +nausea, no diarrhea  Genitourinary: +hematuria, +urgency            Review of Systems   Constitutional: Negative.    Respiratory: Negative.     Cardiovascular: Negative.        Objective:     /68   Pulse 84   Temp 97.1 °F (36.2 °C) (Temporal)   Resp 18   Ht 5' 3.78" (1.62 m)   Wt 79.4 kg (175 " lb)   SpO2 97%   BMI 30.25 kg/m²    Physical Exam  Constitutional:       Appearance: Normal appearance.   Cardiovascular:      Rate and Rhythm: Normal rate and regular rhythm.      Heart sounds: Normal heart sounds.   Pulmonary:      Effort: Pulmonary effort is normal.      Breath sounds: Normal breath sounds.   Neurological:      Mental Status: She is alert.   Psychiatric:         Mood and Affect: Mood normal.         Behavior: Behavior normal.         Thought Content: Thought content normal.         Judgment: Judgment normal.       Recent Results (from the past 3 weeks)   POCT Glycosylated Hemoglobin (HGB A1c)    Collection Time: 07/17/25  2:53 PM   Result Value Ref Range    Hemoglobin A1C, POC 8.5 (A) %            Assessment:     Problem List Items Addressed This Visit          Renal/    Nephrolithiasis    Current Assessment & Plan   Keep scheduled follow up with Dr. Hirsch  History of calcium oxalate stone; Calcium diet discussed with patient            Endocrine    Diabetes mellitus - Primary    Current Assessment & Plan   Continue Farxiga 10 mg daily   Continue Lantus  Diet modification   Keep scheduled follow up with Endocrinology  Monitor CBGS   Return to clinic with any concerns         Relevant Orders    POCT Glycosylated Hemoglobin (HGB A1c) (Completed)        Plan:   1. Type 2 diabetes mellitus with hyperglycemia, with long-term current use of insulin  Assessment & Plan:  Continue Farxiga 10 mg daily   Continue Lantus  Diet modification   Keep scheduled follow up with Endocrinology  Monitor CBGS   Return to clinic with any concerns    Orders:  -     POCT Glycosylated Hemoglobin (HGB A1c)    2. Nephrolithiasis  Assessment & Plan:  Keep scheduled follow up with Dr. Hirsch  History of calcium oxalate stone; Calcium diet discussed with patient                     This note was generated with the assistance of ambient listening technology. Verbal consent was obtained by the patient and  accompanying visitor(s) for the recording of patient appointment to facilitate this note. I attest to having reviewed and edited the generated note for accuracy, though some syntax or spelling errors may persist. Please contact the author of this note for any clarification.

## 2025-07-17 NOTE — ASSESSMENT & PLAN NOTE
Continue Farxiga 10 mg daily   Continue Lantus  Diet modification   Keep scheduled follow up with Endocrinology  Monitor CBGS   Return to clinic with any concerns

## 2025-07-17 NOTE — ASSESSMENT & PLAN NOTE
Keep scheduled follow up with Dr. Hirsch  History of calcium oxalate stone; Calcium diet discussed with patient

## 2025-07-17 NOTE — LETTER
AUTHORIZATION FOR RELEASE OF   CONFIDENTIAL INFORMATION    Dear Dr Crisostomo,    We are seeing Starr Kidd, date of birth 1955, in the clinic at [facility]. Obey Baker MD is the patient's PCP. Starr Kidd has an outstanding lab/procedure at the time we reviewed her chart. In order to help keep her health information updated, she has authorized us to request the following medical record(s):        (  )  MAMMOGRAM                                      (  )  COLONOSCOPY      (  )  PAP SMEAR                                          (  )  OUTSIDE LAB RESULTS     (  )  DEXA SCAN                                          (X)  LAST DM EYE EXAM            (  )  FOOT EXAM                                          (  )  ENTIRE RECORD     (  )  OUTSIDE IMMUNIZATIONS                 (  )  _______________         Please fax records to [facility], Bergeaux, Scott J, MD, [phone].              Patient Name: Starr Kidd  : 1955  Patient Phone #: [phone]

## 2025-07-31 DIAGNOSIS — T78.40XA ALLERGY, INITIAL ENCOUNTER: ICD-10-CM

## 2025-07-31 RX ORDER — LEVOCETIRIZINE DIHYDROCHLORIDE 5 MG/1
5 TABLET, FILM COATED ORAL NIGHTLY
Qty: 90 TABLET | Refills: 3 | Status: SHIPPED | OUTPATIENT
Start: 2025-07-31

## 2025-08-08 DIAGNOSIS — K21.9 GASTROESOPHAGEAL REFLUX DISEASE, UNSPECIFIED WHETHER ESOPHAGITIS PRESENT: ICD-10-CM

## 2025-08-08 RX ORDER — FAMOTIDINE 40 MG/1
40 TABLET, FILM COATED ORAL
Qty: 100 TABLET | Refills: 1 | Status: SHIPPED | OUTPATIENT
Start: 2025-08-08

## (undated) DEVICE — BANDAID GARFIELD

## (undated) DEVICE — SYR 10CC LUER LOCK

## (undated) DEVICE — NDL SPINAL SPINOCAN 22GX3.5

## (undated) DEVICE — SET IV EXT GENERAL 4.9ML 30IN

## (undated) DEVICE — TRAY SPINAL 22GA W/DRUG

## (undated) DEVICE — SYR DISP LL 5CC

## (undated) DEVICE — APPLICATOR CHLORAPREP ORN 26ML

## (undated) DEVICE — TOWEL OR BLUE STRL 16X26 4/PK

## (undated) DEVICE — GLOVE PROTEXIS HYDROGEL SZ7.5